# Patient Record
Sex: FEMALE | Race: WHITE | Employment: FULL TIME | URBAN - METROPOLITAN AREA
[De-identification: names, ages, dates, MRNs, and addresses within clinical notes are randomized per-mention and may not be internally consistent; named-entity substitution may affect disease eponyms.]

---

## 2017-01-04 ENCOUNTER — TELEPHONE (OUTPATIENT)
Dept: INTERNAL MEDICINE CLINIC | Facility: CLINIC | Age: 62
End: 2017-01-04

## 2017-01-04 DIAGNOSIS — I10 ESSENTIAL HYPERTENSION: ICD-10-CM

## 2017-01-04 DIAGNOSIS — E78.5 HYPERLIPIDEMIA, UNSPECIFIED HYPERLIPIDEMIA TYPE: Primary | ICD-10-CM

## 2017-01-04 NOTE — TELEPHONE ENCOUNTER
To nursing,   please tell patient do lab before 1/17/17 appointment–lipid and AST level. Orders are already in the system–they were placed on 4/25/16. Thanks.

## 2017-01-07 ENCOUNTER — LAB ENCOUNTER (OUTPATIENT)
Dept: LAB | Facility: HOSPITAL | Age: 62
End: 2017-01-07
Attending: INTERNAL MEDICINE
Payer: COMMERCIAL

## 2017-01-07 DIAGNOSIS — E78.2 MIXED HYPERLIPIDEMIA: Primary | ICD-10-CM

## 2017-01-07 LAB
AST SERPL-CCNC: 17 U/L (ref 15–41)
CHOLEST SERPL-MCNC: 163 MG/DL (ref 110–200)
HDLC SERPL-MCNC: 40 MG/DL
LDLC SERPL CALC-MCNC: 77 MG/DL (ref 0–99)
NONHDLC SERPL-MCNC: 123 MG/DL
TRIGL SERPL-MCNC: 228 MG/DL (ref 1–149)

## 2017-01-07 PROCEDURE — 80061 LIPID PANEL: CPT

## 2017-01-07 PROCEDURE — 84450 TRANSFERASE (AST) (SGOT): CPT

## 2017-01-07 PROCEDURE — 36415 COLL VENOUS BLD VENIPUNCTURE: CPT

## 2017-01-17 ENCOUNTER — OFFICE VISIT (OUTPATIENT)
Dept: INTERNAL MEDICINE CLINIC | Facility: CLINIC | Age: 62
End: 2017-01-17

## 2017-01-17 VITALS
SYSTOLIC BLOOD PRESSURE: 150 MMHG | HEART RATE: 68 BPM | WEIGHT: 193 LBS | BODY MASS INDEX: 31.77 KG/M2 | TEMPERATURE: 98 F | DIASTOLIC BLOOD PRESSURE: 80 MMHG | HEIGHT: 65.5 IN | OXYGEN SATURATION: 98 %

## 2017-01-17 DIAGNOSIS — F32.A ANXIETY AND DEPRESSION: ICD-10-CM

## 2017-01-17 DIAGNOSIS — I10 ESSENTIAL HYPERTENSION: Primary | ICD-10-CM

## 2017-01-17 DIAGNOSIS — E78.2 HYPERLIPIDEMIA, MIXED: ICD-10-CM

## 2017-01-17 DIAGNOSIS — F41.9 ANXIETY AND DEPRESSION: ICD-10-CM

## 2017-01-17 PROCEDURE — 99212 OFFICE O/P EST SF 10 MIN: CPT | Performed by: INTERNAL MEDICINE

## 2017-01-17 PROCEDURE — 99214 OFFICE O/P EST MOD 30 MIN: CPT | Performed by: INTERNAL MEDICINE

## 2017-01-17 RX ORDER — ATENOLOL 25 MG/1
TABLET ORAL
Qty: 90 TABLET | Refills: 3 | Status: SHIPPED | OUTPATIENT
Start: 2017-01-17 | End: 2017-02-13

## 2017-01-17 RX ORDER — OMEPRAZOLE 20 MG/1
20 CAPSULE, DELAYED RELEASE ORAL DAILY
Qty: 90 CAPSULE | Refills: 3 | Status: SHIPPED | OUTPATIENT
Start: 2017-01-17 | End: 2017-06-13

## 2017-01-17 RX ORDER — LISINOPRIL 40 MG/1
40 TABLET ORAL DAILY
Qty: 90 TABLET | Refills: 3 | Status: SHIPPED | OUTPATIENT
Start: 2017-01-17 | End: 2017-06-13

## 2017-01-17 RX ORDER — ALPRAZOLAM 0.25 MG/1
TABLET ORAL
Qty: 30 TABLET | Refills: 2 | Status: SHIPPED
Start: 2017-01-17 | End: 2017-06-13

## 2017-01-17 NOTE — PROGRESS NOTES
Angelia Calderon is a 57year old female who presents for     check at 9 months. HTN: home BPs are not checked lately. Hyperlipidemia: changed from simvastatin 10 mg daily to lipitor 10 mg daily --see 4/25/16 phone call.  She waited until Oct to star Comment endometrial bx-dysplasia    CHOLECYSTECTOMY      APPENDECTOMY      T&A      MASTECTOMY LEFT      Comment Dr Abbey Lucio Left     Comment Dr Estefany Brandon  2012    Comment lap. Becca Hernandez laterally. No ax nodes. Abdomen: soft, nontender without mass or hepatosplenomegaly  Extremities: no edema  Neurologic: alert and oriented  R arm and low back non tend. Affect-minimal anxiety noted.        ASSESSMENT AND PLAN:     Hypertension   on zes Oral Tab TAKE 1 BY MOUTH DAILY Disp: 90 tablet Rfl: 3   lisinopril 40 MG Oral Tab Take 1 tablet (40 mg total) by mouth daily. Disp: 90 tablet Rfl: 3   omeprazole 20 MG Oral Capsule Delayed Release Take 1 capsule (20 mg total) by mouth daily.  Disp: 90 capsu

## 2017-02-13 ENCOUNTER — TELEPHONE (OUTPATIENT)
Dept: INTERNAL MEDICINE CLINIC | Facility: CLINIC | Age: 62
End: 2017-02-13

## 2017-02-13 DIAGNOSIS — I10 ESSENTIAL HYPERTENSION: ICD-10-CM

## 2017-02-13 DIAGNOSIS — E78.2 HYPERLIPIDEMIA, MIXED: Primary | ICD-10-CM

## 2017-02-13 RX ORDER — SIMVASTATIN 10 MG
10 TABLET ORAL EVERY OTHER DAY
Qty: 1 TABLET | Refills: 0 | COMMUNITY
Start: 2017-02-13 | End: 2017-06-13

## 2017-02-13 RX ORDER — ATENOLOL 25 MG/1
12.5 TABLET ORAL DAILY
Qty: 1 TABLET | Refills: 0 | COMMUNITY
Start: 2017-02-13 | End: 2017-07-07

## 2017-02-13 NOTE — TELEPHONE ENCOUNTER
Pt is calling to report blood pressure readings. Morning readings have been around 126/80. Evenings - 148/82   140/90 today    Pt is calling for medication directions. Pt stopped Lipitor after the last visit.     Tasked to Nursing

## 2017-02-13 NOTE — TELEPHONE ENCOUNTER
Pt is worried about taking Atenolol 50 mg. She takes her Atenolol at night and is worried about her blood pressure going too low. She wondered if she could take Atenolol 25 mg two times daily, in the morning with the lisinopril and then again at night?

## 2017-02-13 NOTE — TELEPHONE ENCOUNTER
To nursing,   please tell patient to increase atenolol 50 mg daily. #90 with 3 refills.   If the muscle pain is better off the Lipitor, options are to not restart cholesterol medicine and follow diet closely --or if she is willing to start a medicine in an

## 2017-02-13 NOTE — TELEPHONE ENCOUNTER
Patient contacted and notified that per Charly Broussard, ok for her to take atenolol as 25mg BID and to go back to simvastatin 10mg daily. Patient did not need refills at this time, she will let us know when close to running out of medication.  Patient also no

## 2017-02-14 NOTE — TELEPHONE ENCOUNTER
TSH added to lab order, copy of order for TSH, lipids and AST mailed to patient to do in 6 weeks.  Patient notified

## 2017-05-26 ENCOUNTER — TELEPHONE (OUTPATIENT)
Dept: INTERNAL MEDICINE CLINIC | Facility: CLINIC | Age: 62
End: 2017-05-26

## 2017-05-26 NOTE — TELEPHONE ENCOUNTER
To nursing, yes, please do the labs intended for the 6 mo check --in other words, do lab before 6/13/17 visit --cbc cmp tsh lipid ua.  Orders are in the computer--entered in jan 2017    She never went for the lipid AST TSH entered at 2/13/17 phone call and

## 2017-05-26 NOTE — TELEPHONE ENCOUNTER
Patient scheduled physical with Dr Edi Latif on June 13  Requests cbc is added to her orders since she is coming in for her physical    Please confirm to patient [phone]

## 2017-05-26 NOTE — TELEPHONE ENCOUNTER
Called patient and relayed DR. BRENNAN message - verbalized understanding.  Lab order from February cancelled

## 2017-05-27 ENCOUNTER — LAB ENCOUNTER (OUTPATIENT)
Dept: LAB | Facility: HOSPITAL | Age: 62
End: 2017-05-27
Attending: INTERNAL MEDICINE
Payer: COMMERCIAL

## 2017-05-27 DIAGNOSIS — I10 ESSENTIAL HYPERTENSION: ICD-10-CM

## 2017-05-27 DIAGNOSIS — E78.2 HYPERLIPIDEMIA, MIXED: ICD-10-CM

## 2017-05-27 PROCEDURE — 85025 COMPLETE CBC W/AUTO DIFF WBC: CPT

## 2017-05-27 PROCEDURE — 80053 COMPREHEN METABOLIC PANEL: CPT

## 2017-05-27 PROCEDURE — 81001 URINALYSIS AUTO W/SCOPE: CPT

## 2017-05-27 PROCEDURE — 84443 ASSAY THYROID STIM HORMONE: CPT

## 2017-05-27 PROCEDURE — 80061 LIPID PANEL: CPT

## 2017-05-27 PROCEDURE — 36415 COLL VENOUS BLD VENIPUNCTURE: CPT

## 2017-05-30 ENCOUNTER — TELEPHONE (OUTPATIENT)
Dept: INTERNAL MEDICINE CLINIC | Facility: CLINIC | Age: 62
End: 2017-05-30

## 2017-05-30 DIAGNOSIS — D64.9 MILD ANEMIA: Primary | ICD-10-CM

## 2017-05-30 NOTE — TELEPHONE ENCOUNTER
To nursing. Please tell pt lab done on 5/27/17-cbc cmp tsh lipid ua --results are ok except very mild anemia with Hgb 11.7. We'll ask the lab to check her iron levels on the specimen of 5/27. See me 6/13/17 as planned.    To nursing--please call lab to a

## 2017-05-30 NOTE — TELEPHONE ENCOUNTER
Pt notified labs ok except a very mild anemia with hgb 11.7 to check iron levels     Cannot use specimen from 5-27  Pt notified that orders are entered and she can do without fasting   Will get done this wk     See DR BRENNAN as planned on 6-13

## 2017-06-05 ENCOUNTER — APPOINTMENT (OUTPATIENT)
Dept: LAB | Age: 62
End: 2017-06-05
Attending: INTERNAL MEDICINE
Payer: COMMERCIAL

## 2017-06-05 DIAGNOSIS — D64.9 MILD ANEMIA: ICD-10-CM

## 2017-06-05 PROCEDURE — 83540 ASSAY OF IRON: CPT

## 2017-06-05 PROCEDURE — 82746 ASSAY OF FOLIC ACID SERUM: CPT

## 2017-06-05 PROCEDURE — 84466 ASSAY OF TRANSFERRIN: CPT

## 2017-06-05 PROCEDURE — 82607 VITAMIN B-12: CPT

## 2017-06-05 PROCEDURE — 36415 COLL VENOUS BLD VENIPUNCTURE: CPT

## 2017-06-05 PROCEDURE — 82728 ASSAY OF FERRITIN: CPT

## 2017-06-13 ENCOUNTER — OFFICE VISIT (OUTPATIENT)
Dept: INTERNAL MEDICINE CLINIC | Facility: CLINIC | Age: 62
End: 2017-06-13

## 2017-06-13 VITALS
TEMPERATURE: 98 F | HEART RATE: 68 BPM | WEIGHT: 182 LBS | DIASTOLIC BLOOD PRESSURE: 72 MMHG | BODY MASS INDEX: 29.96 KG/M2 | SYSTOLIC BLOOD PRESSURE: 124 MMHG | HEIGHT: 65.5 IN

## 2017-06-13 DIAGNOSIS — F41.9 ANXIETY: ICD-10-CM

## 2017-06-13 DIAGNOSIS — I83.92 VARICOSE VEINS OF LEFT LOWER EXTREMITY: ICD-10-CM

## 2017-06-13 DIAGNOSIS — Z00.00 ANNUAL PHYSICAL EXAM: Primary | ICD-10-CM

## 2017-06-13 DIAGNOSIS — K44.9 HIATAL HERNIA: ICD-10-CM

## 2017-06-13 DIAGNOSIS — I10 ESSENTIAL HYPERTENSION: ICD-10-CM

## 2017-06-13 DIAGNOSIS — D64.9 MILD ANEMIA: ICD-10-CM

## 2017-06-13 DIAGNOSIS — E78.2 HYPERLIPIDEMIA, MIXED: ICD-10-CM

## 2017-06-13 PROCEDURE — 99396 PREV VISIT EST AGE 40-64: CPT | Performed by: INTERNAL MEDICINE

## 2017-06-13 RX ORDER — AMLODIPINE BESYLATE 2.5 MG/1
2.5 TABLET ORAL DAILY
Qty: 90 TABLET | Refills: 3 | Status: SHIPPED | OUTPATIENT
Start: 2017-06-13 | End: 2017-07-31

## 2017-06-13 RX ORDER — LISINOPRIL 40 MG/1
40 TABLET ORAL DAILY
Qty: 90 TABLET | Refills: 3 | Status: SHIPPED | OUTPATIENT
Start: 2017-06-13 | End: 2018-05-01

## 2017-06-13 RX ORDER — ALPRAZOLAM 0.25 MG/1
TABLET ORAL
Qty: 30 TABLET | Refills: 2 | Status: SHIPPED
Start: 2017-06-13 | End: 2018-05-30

## 2017-06-13 RX ORDER — SIMVASTATIN 10 MG
10 TABLET ORAL EVERY OTHER DAY
Qty: 45 TABLET | Refills: 3 | Status: SHIPPED | OUTPATIENT
Start: 2017-06-13 | End: 2018-05-01

## 2017-06-13 RX ORDER — OMEPRAZOLE 20 MG/1
20 CAPSULE, DELAYED RELEASE ORAL DAILY
Qty: 90 CAPSULE | Refills: 3 | Status: SHIPPED | OUTPATIENT
Start: 2017-06-13 | End: 2018-05-01

## 2017-06-13 NOTE — PROGRESS NOTES
Pio Pena is a 57year old female who presents for     Annual physical.     HTN: home BPs are 100-138/60-80. Taking lisinopril 40 mg daily and she feels the atenolol is causing anxiety and she cut dose last wk to 12.5 mg daily.  She requests to get o PROSTHESIS Left     Comment Dr Mindy Paez  2012    Comment lap. paraesophageal hernia repair ; lap. partial fundoplicaton 069 degrees w paraesoph hernia repair; Dr Quyen Douglas  2002    COLONOSCOPY  20 nontender without mass or hepatosplenomegaly  Extremities: no edema, varicose veins LLE-not inflammed. Neurologic: alert and oriented  Affect: mild anxiety.        ASSESSMENT AND PLAN:     Annual physical    Mild anemia:  Hgb 11.7 on 5/27/17--down from 12 daily.  Disp: 1 tablet Rfl: 0   simvastatin 10 MG Oral Tab Take 10 mg by mouth every other day. Disp: 1 tablet Rfl: 0   lisinopril 40 MG Oral Tab Take 1 tablet (40 mg total) by mouth daily.  Disp: 90 tablet Rfl: 3   omeprazole 20 MG Oral Capsule Delayed Re

## 2017-06-17 ENCOUNTER — HOSPITAL ENCOUNTER (OUTPATIENT)
Dept: CT IMAGING | Facility: HOSPITAL | Age: 62
Discharge: HOME | End: 2017-06-17
Attending: INTERNAL MEDICINE

## 2017-06-17 VITALS — DIASTOLIC BLOOD PRESSURE: 72 MMHG | HEART RATE: 64 BPM | SYSTOLIC BLOOD PRESSURE: 119 MMHG

## 2017-06-17 DIAGNOSIS — Z13.6 SCREENING FOR CARDIOVASCULAR CONDITION: ICD-10-CM

## 2017-06-17 NOTE — IMAGING NOTE
Cholesterol/Glucose screening not performed. Recent lipid panel, 5-27-17. Results discussed with  Lorena who verbalized understanding. Preliminary results for CT Calcium Score provided: 5.25. Risks and findings discussed with Mrs. Carrillo who verb

## 2017-06-20 ENCOUNTER — TELEPHONE (OUTPATIENT)
Dept: INTERNAL MEDICINE CLINIC | Facility: CLINIC | Age: 62
End: 2017-06-20

## 2017-06-20 NOTE — TELEPHONE ENCOUNTER
To nursing, please tell patient 6/17/17–CT calcium scoring result is ok--calcium score is 5. This is a good result (minimal amount of calcium). Continue meds for cholesterol and hypertension. Thanks.

## 2017-07-07 PROBLEM — Z80.0 FAMILY HISTORY OF COLON CANCER IN MOTHER: Status: ACTIVE | Noted: 2017-07-07

## 2017-07-20 ENCOUNTER — TELEPHONE (OUTPATIENT)
Dept: INTERNAL MEDICINE CLINIC | Facility: CLINIC | Age: 62
End: 2017-07-20

## 2017-07-20 ENCOUNTER — HOSPITAL ENCOUNTER (EMERGENCY)
Facility: HOSPITAL | Age: 62
Discharge: HOME | End: 2017-07-20
Attending: EMERGENCY MEDICINE
Payer: COMMERCIAL

## 2017-07-20 VITALS
SYSTOLIC BLOOD PRESSURE: 132 MMHG | HEART RATE: 95 BPM | OXYGEN SATURATION: 96 % | BODY MASS INDEX: 29.16 KG/M2 | WEIGHT: 175 LBS | TEMPERATURE: 98 F | RESPIRATION RATE: 18 BRPM | DIASTOLIC BLOOD PRESSURE: 79 MMHG | HEIGHT: 65 IN

## 2017-07-20 DIAGNOSIS — I82.412 DEEP VEIN THROMBOSIS (DVT) OF FEMORAL VEIN OF LEFT LOWER EXTREMITY, UNSPECIFIED CHRONICITY (HCC): Primary | ICD-10-CM

## 2017-07-20 LAB
ANION GAP SERPL CALC-SCNC: 10 MMOL/L (ref 0–18)
APTT PPP: 25.2 SECONDS (ref 23.2–35.3)
BASOPHILS # BLD: 0 K/UL (ref 0–0.2)
BASOPHILS NFR BLD: 0 %
BUN SERPL-MCNC: 13 MG/DL (ref 8–20)
BUN/CREAT SERPL: 14 (ref 10–20)
CALCIUM SERPL-MCNC: 10.1 MG/DL (ref 8.5–10.5)
CHLORIDE SERPL-SCNC: 101 MMOL/L (ref 95–110)
CO2 SERPL-SCNC: 25 MMOL/L (ref 22–32)
CREAT SERPL-MCNC: 0.93 MG/DL (ref 0.5–1.5)
D DIMER PPP FEU-MCNC: 0.55 MCG/ML (ref ?–0.63)
EOSINOPHIL # BLD: 0.1 K/UL (ref 0–0.7)
EOSINOPHIL NFR BLD: 1 %
ERYTHROCYTE [DISTWIDTH] IN BLOOD BY AUTOMATED COUNT: 15 % (ref 11–15)
GLUCOSE SERPL-MCNC: 105 MG/DL (ref 70–99)
HCT VFR BLD AUTO: 40.1 % (ref 35–48)
HGB BLD-MCNC: 13.2 G/DL (ref 12–16)
INR BLD: 1 (ref 0.9–1.2)
LYMPHOCYTES # BLD: 1.1 K/UL (ref 1–4)
LYMPHOCYTES NFR BLD: 10 %
MCH RBC QN AUTO: 29.4 PG (ref 27–32)
MCHC RBC AUTO-ENTMCNC: 33 G/DL (ref 32–37)
MCV RBC AUTO: 89.2 FL (ref 80–100)
MONOCYTES # BLD: 0.6 K/UL (ref 0–1)
MONOCYTES NFR BLD: 5 %
NEUTROPHILS # BLD AUTO: 9.2 K/UL (ref 1.8–7.7)
NEUTROPHILS NFR BLD: 84 %
OSMOLALITY UR CALC.SUM OF ELEC: 282 MOSM/KG (ref 275–295)
PLATELET # BLD AUTO: 266 K/UL (ref 140–400)
PMV BLD AUTO: 8.2 FL (ref 7.4–10.3)
POTASSIUM SERPL-SCNC: 3.9 MMOL/L (ref 3.3–5.1)
PROTHROMBIN TIME: 12.8 SECONDS (ref 11.8–14.5)
RBC # BLD AUTO: 4.49 M/UL (ref 3.7–5.4)
SODIUM SERPL-SCNC: 136 MMOL/L (ref 136–144)
WBC # BLD AUTO: 11 K/UL (ref 4–11)

## 2017-07-20 PROCEDURE — 85730 THROMBOPLASTIN TIME PARTIAL: CPT | Performed by: EMERGENCY MEDICINE

## 2017-07-20 PROCEDURE — 36415 COLL VENOUS BLD VENIPUNCTURE: CPT

## 2017-07-20 PROCEDURE — 99283 EMERGENCY DEPT VISIT LOW MDM: CPT

## 2017-07-20 PROCEDURE — 85379 FIBRIN DEGRADATION QUANT: CPT | Performed by: EMERGENCY MEDICINE

## 2017-07-20 PROCEDURE — 85025 COMPLETE CBC W/AUTO DIFF WBC: CPT | Performed by: EMERGENCY MEDICINE

## 2017-07-20 PROCEDURE — 80048 BASIC METABOLIC PNL TOTAL CA: CPT | Performed by: EMERGENCY MEDICINE

## 2017-07-20 PROCEDURE — 96372 THER/PROPH/DIAG INJ SC/IM: CPT

## 2017-07-20 PROCEDURE — 85610 PROTHROMBIN TIME: CPT | Performed by: EMERGENCY MEDICINE

## 2017-07-20 RX ORDER — ENOXAPARIN SODIUM 100 MG/ML
1 INJECTION SUBCUTANEOUS 2 TIMES DAILY
Qty: 20 VIAL | Refills: 0 | Status: SHIPPED | OUTPATIENT
Start: 2017-07-20 | End: 2017-07-31

## 2017-07-20 RX ORDER — ENOXAPARIN SODIUM 100 MG/ML
80 INJECTION SUBCUTANEOUS ONCE
Status: COMPLETED | OUTPATIENT
Start: 2017-07-20 | End: 2017-07-20

## 2017-07-20 NOTE — TELEPHONE ENCOUNTER
Patient reports she was seen for a routine visit at vein clinic today for . She has had chronic swelling to that leg after having her veins stripped years ago. Patient denies new symptoms besides the same chronic swelling she has had.  Was told by

## 2017-07-20 NOTE — TELEPHONE ENCOUNTER
Pt scheduled an appt with  for 7/21. She went to the vein clinic at [facility], had an ultrasound. She has a clot. She wanted to speak to a nurse or a doctor on call today. Transferred to a triage nurse.

## 2017-07-21 ENCOUNTER — HOSPITAL ENCOUNTER (OUTPATIENT)
Dept: ULTRASOUND IMAGING | Facility: HOSPITAL | Age: 62
Discharge: HOME | End: 2017-07-21
Attending: INTERNAL MEDICINE
Payer: COMMERCIAL

## 2017-07-21 ENCOUNTER — OFFICE VISIT (OUTPATIENT)
Dept: INTERNAL MEDICINE CLINIC | Facility: CLINIC | Age: 62
End: 2017-07-21

## 2017-07-21 VITALS
WEIGHT: 178.81 LBS | BODY MASS INDEX: 30 KG/M2 | DIASTOLIC BLOOD PRESSURE: 80 MMHG | HEART RATE: 97 BPM | TEMPERATURE: 98 F | OXYGEN SATURATION: 94 % | SYSTOLIC BLOOD PRESSURE: 138 MMHG

## 2017-07-21 DIAGNOSIS — I82.412 DEEP VEIN THROMBOSIS (DVT) OF FEMORAL VEIN OF LEFT LOWER EXTREMITY, UNSPECIFIED CHRONICITY (HCC): ICD-10-CM

## 2017-07-21 DIAGNOSIS — I82.412 DEEP VEIN THROMBOSIS (DVT) OF FEMORAL VEIN OF LEFT LOWER EXTREMITY, UNSPECIFIED CHRONICITY (HCC): Primary | ICD-10-CM

## 2017-07-21 PROCEDURE — 99213 OFFICE O/P EST LOW 20 MIN: CPT | Performed by: INTERNAL MEDICINE

## 2017-07-21 PROCEDURE — 99215 OFFICE O/P EST HI 40 MIN: CPT | Performed by: INTERNAL MEDICINE

## 2017-07-21 PROCEDURE — 93971 EXTREMITY STUDY: CPT | Performed by: INTERNAL MEDICINE

## 2017-07-21 PROCEDURE — 93005 ELECTROCARDIOGRAM TRACING: CPT | Performed by: INTERNAL MEDICINE

## 2017-07-21 PROCEDURE — 93010 ELECTROCARDIOGRAM REPORT: CPT | Performed by: INTERNAL MEDICINE

## 2017-07-21 NOTE — ED INITIAL ASSESSMENT (HPI)
Pt reports left sided femoral, and popliteal blood clot. Pt reports going to vein clinic today. Pt denies cp, or sob.

## 2017-07-21 NOTE — PROGRESS NOTES
Neville Galindo is a 60year old female who presents for     DVT left leg. Patient was seen yesterday by [facility] Dr Yessi Prado for varicose veins.   Pt had noticed worsened in \"color\" of the veins on the LLE below the medial ankle a neg for vitaliy 2009   • History of vein stripping    • Hyperlipidemia     elevated cholesterol and triglycerides   • Hypertension, essential 1993   • Iron deficiency anemia 2004    endoscopy (-)   • Screening for heart disease 6/2017    calcium heart score loss  Respiratory: No cough, wheezing or dyspnea  Cardiac: No chest pain or palpitations  Gastrointestinal: No abdominal pain, vomiting, diarrhea or constipation, no blood or black stools.    Genitourinary:  No dysuria or blood in the urine  Dermatologic: with central patency and may represent chronic recannulation of a previous thrombus. Without prior ultrasounds to compare, she said there is no way to tell whether this is acute or chronic.   She recommends doing a follow-up Doppler after treatment to show Oral Capsule Delayed Release Take 1 capsule (20 mg total) by mouth daily.  Disp: 90 capsule Rfl: 3         Alex Leal MD  7/21/2017

## 2017-07-21 NOTE — TELEPHONE ENCOUNTER
Spoke with pt. [facility] for an initial consultation for chronic LE edema/venous insufficiency. They apparently did a venous doppler in the office. Was told she has a femoral and popliteal vein DVT.   Was given a Rx for xarelto but was

## 2017-07-21 NOTE — ED PROVIDER NOTES
Patient Seen in: [facility]     History   Patient presents with:  Deep Vein Thrombosis (cardiovascular)    Stated Complaint: blood clot     HPI    60year old female presented to the ER complaining that she was diagnosed with a degrees w paraesoph                hernia repair; Dr Melania Berg: Iram Agudelo Right  : IMPLANTABLE BREAST PROSTHESIS Left      Comment: Dr Arley Rae  : MASTECTOMY LEFT      Comment: Dr Arnie Layton: MASTECTOMY RIGHT  No [07/20/17 1949]  BP: 150/81  Pulse: 103  Resp: 20  Temp: 97.9 °F (36.6 °C)  Temp src: Temporal  SpO2: 97 %  O2 Device: None (Room air)    Current:/84   Pulse 102   Temp 97.9 °F (36.6 °C) (Temporal)   Resp 20   Ht 165.1 cm (5' 5\")   Wt 79.4 kg   SpO2 PTT, ACTIVATED - Normal   D-DIMER - Normal   CBC WITH DIFFERENTIAL WITH PLATELET    Narrative: The following orders were created for panel order CBC WITH DIFFERENTIAL WITH PLATELET.   Procedure                               Abnormality         Status ED evaluation. Radiology Interpretation: None    Monitor Interpretation: normal sinus rhythm with a rate of 100    Oxygen Saturation: 95% on room air, Normal    Consults: No orders of the defined types were placed in this encounter.       MD Discussions

## 2017-07-24 ENCOUNTER — LAB ENCOUNTER (OUTPATIENT)
Dept: LAB | Age: 62
End: 2017-07-24
Attending: INTERNAL MEDICINE
Payer: COMMERCIAL

## 2017-07-24 DIAGNOSIS — I82.412 DEEP VEIN THROMBOSIS (DVT) OF FEMORAL VEIN OF LEFT LOWER EXTREMITY, UNSPECIFIED CHRONICITY (HCC): ICD-10-CM

## 2017-07-24 LAB
BASOPHILS # BLD: 0 K/UL (ref 0–0.2)
BASOPHILS NFR BLD: 1 %
EOSINOPHIL # BLD: 0.1 K/UL (ref 0–0.7)
EOSINOPHIL NFR BLD: 2 %
ERYTHROCYTE [DISTWIDTH] IN BLOOD BY AUTOMATED COUNT: 14.5 % (ref 11–15)
HCT VFR BLD AUTO: 35.8 % (ref 35–48)
HGB BLD-MCNC: 12.1 G/DL (ref 12–16)
LYMPHOCYTES # BLD: 1.4 K/UL (ref 1–4)
LYMPHOCYTES NFR BLD: 23 %
MCH RBC QN AUTO: 29.8 PG (ref 27–32)
MCHC RBC AUTO-ENTMCNC: 33.9 G/DL (ref 32–37)
MCV RBC AUTO: 88 FL (ref 80–100)
MONOCYTES # BLD: 0.4 K/UL (ref 0–1)
MONOCYTES NFR BLD: 7 %
NEUTROPHILS # BLD AUTO: 4 K/UL (ref 1.8–7.7)
NEUTROPHILS NFR BLD: 67 %
PLATELET # BLD AUTO: 218 K/UL (ref 140–400)
PMV BLD AUTO: 8.2 FL (ref 7.4–10.3)
RBC # BLD AUTO: 4.07 M/UL (ref 3.7–5.4)
WBC # BLD AUTO: 6 K/UL (ref 4–11)

## 2017-07-24 PROCEDURE — 85390 FIBRINOLYSINS SCREEN I&R: CPT

## 2017-07-24 PROCEDURE — 85306 CLOT INHIBIT PROT S FREE: CPT

## 2017-07-24 PROCEDURE — 81240 F2 GENE: CPT

## 2017-07-24 PROCEDURE — 85613 RUSSELL VIPER VENOM DILUTED: CPT

## 2017-07-24 PROCEDURE — 81241 F5 GENE: CPT

## 2017-07-24 PROCEDURE — 85610 PROTHROMBIN TIME: CPT

## 2017-07-24 PROCEDURE — 85730 THROMBOPLASTIN TIME PARTIAL: CPT

## 2017-07-24 PROCEDURE — 85305 CLOT INHIBIT PROT S TOTAL: CPT

## 2017-07-24 PROCEDURE — 85300 ANTITHROMBIN III ACTIVITY: CPT

## 2017-07-24 PROCEDURE — 85025 COMPLETE CBC W/AUTO DIFF WBC: CPT

## 2017-07-24 PROCEDURE — 85302 CLOT INHIBIT PROT C ANTIGEN: CPT

## 2017-07-24 PROCEDURE — 86147 CARDIOLIPIN ANTIBODY EA IG: CPT

## 2017-07-24 PROCEDURE — 36415 COLL VENOUS BLD VENIPUNCTURE: CPT

## 2017-07-25 ENCOUNTER — OFFICE VISIT (OUTPATIENT)
Dept: HEMATOLOGY/ONCOLOGY | Facility: HOSPITAL | Age: 62
End: 2017-07-25
Attending: INTERNAL MEDICINE
Payer: COMMERCIAL

## 2017-07-25 ENCOUNTER — TELEPHONE (OUTPATIENT)
Dept: INTERNAL MEDICINE CLINIC | Facility: CLINIC | Age: 62
End: 2017-07-25

## 2017-07-25 DIAGNOSIS — I82.512 CHRONIC DEEP VEIN THROMBOSIS (DVT) OF FEMORAL VEIN OF LEFT LOWER EXTREMITY (HCC): Primary | ICD-10-CM

## 2017-07-25 DIAGNOSIS — Z51.81 MEDICATION MONITORING ENCOUNTER: Primary | ICD-10-CM

## 2017-07-25 LAB
AT III ACT/NOR PPP CHRO: 94 % NHP (ref 90–122)
F2 C.20210G>A GENO BLD/T: NORMAL
PROT S ACT/NOR PPP: 98 % (ref 48–153)

## 2017-07-25 PROCEDURE — 99245 OFF/OP CONSLTJ NEW/EST HI 55: CPT | Performed by: INTERNAL MEDICINE

## 2017-07-25 PROCEDURE — 99212 OFFICE O/P EST SF 10 MIN: CPT | Performed by: INTERNAL MEDICINE

## 2017-07-25 NOTE — TELEPHONE ENCOUNTER
Pt notified cbc results  Normal / DR BRENNAN   To recheck cbc tomorrow 7-26 to  Monitor lovenox  Order in computer   Pt states she does shave appt with DR Keisha Cates today - noted by DR Arthor Bernheim

## 2017-07-25 NOTE — TELEPHONE ENCOUNTER
To nursing,   please tell patient results of the CBC done 7/24/17 (yesterday)–results are normal.  Her hemoglobin is 12.1. Her platelet count is 269. Check CBC again Wednesday 7/26/17--to monitor the Lovenox she is taking.   (CBC order entered.)  I note s

## 2017-07-26 LAB
APTT PPP: 31.7 SECONDS (ref 23.2–35.3)
CONFIRM DRVVT: 1 S (ref 0–1.1)
PROTHROMBIN TIME: 13.3 SECONDS (ref 11.8–14.5)

## 2017-07-26 NOTE — PROGRESS NOTES
Hematology Consultation Note    Patient Name: Joann Ivan   YOB: 1955   Medical Record Number: [id number]   CSN: [id number]   Consulting Physician: Aureliano Greco MD  Referring Physician(s): Kayleigh Arriaga  Date of Consultation: 7/25/201 weight loss, no change in energy level, no recurrent fevers or infections. Patient is severely concerned that she has continued anemia as she was found to have evidence of a bleeding source via endoscopy in 2004.  However, she denies any symptoms of chest p Jonn  : MASTECTOMY LEFT      Comment: Dr Joann Sutherland: MASTECTOMY RIGHT  No date: OTHER SURGICAL HISTORY      Comment: vein stripping  No date: T&A  2009: UPPER GI ENDOSCOPY,DIAGNOSIS      Comment: duodenal biopsy neg for sprue  1970s: VEIN LI dysphagia, odynophagia, reflux symptoms, nausea, vomiting, change in bowel habits, diarrhea, constipation and abdominal pain. Integument/breast: Negative for rash, skin lesions, and pruritus.   Hematologic/lymphatic: Negative for easy bruising, bleeding, a CO2 25 07/20/2017 08:09 PM   EQHGHC 41 05/27/2017 08:11 AM   AST 25 05/27/2017 08:11 AM   ALT 21 05/27/2017 08:11 AM     U/S Venous Doppler Left Leg 7/21/17  =====  CONCLUSION:           Nonocclusive thrombus in portions of the left femoral and popliteal burden  --Discussed with patient that based on her prior history of blood clots at age19 and her newly discovered second episode of a lower extremity clot without provoking symptoms whose original date of onset is unknown, she may benefit from indefinite a anticoagulation for DVT prevention  --Low suspicion for this condition being associated with her DVT    4.)  Right invasive breast cancer ([place]-/[place]-) in 1989    --treated with mastectomy and adjuvant chemotherapy  --See above regarding surveillance monitorin

## 2017-07-27 LAB
CARDIOLIPIN IGA SERPL-ACNC: 2.8 APL (ref 0–11.9)
CARDIOLIPIN IGG SERPL-ACNC: 8.3 GPL (ref 0–14.9)
CARDIOLIPIN IGM SERPL-ACNC: 7 MPL (ref 0–12.4)
PROTEIN C, TOTAL ANTIGEN: 239 %
PROTEIN S, TOTAL ANTIGEN: 178 %

## 2017-07-28 PROCEDURE — 88305 TISSUE EXAM BY PATHOLOGIST: CPT | Performed by: INTERNAL MEDICINE

## 2017-07-31 ENCOUNTER — OFFICE VISIT (OUTPATIENT)
Dept: INTERNAL MEDICINE CLINIC | Facility: CLINIC | Age: 62
End: 2017-07-31

## 2017-07-31 ENCOUNTER — LAB ENCOUNTER (OUTPATIENT)
Dept: LAB | Age: 62
End: 2017-07-31
Attending: INTERNAL MEDICINE
Payer: COMMERCIAL

## 2017-07-31 VITALS
DIASTOLIC BLOOD PRESSURE: 70 MMHG | TEMPERATURE: 98 F | OXYGEN SATURATION: 96 % | HEART RATE: 68 BPM | SYSTOLIC BLOOD PRESSURE: 112 MMHG | BODY MASS INDEX: 30.16 KG/M2 | HEIGHT: 65 IN | WEIGHT: 181 LBS

## 2017-07-31 DIAGNOSIS — I82.412 DEEP VEIN THROMBOSIS (DVT) OF FEMORAL VEIN OF LEFT LOWER EXTREMITY, UNSPECIFIED CHRONICITY (HCC): Primary | ICD-10-CM

## 2017-07-31 DIAGNOSIS — M76.60 ACHILLES TENDON PAIN: ICD-10-CM

## 2017-07-31 DIAGNOSIS — D68.51 FACTOR 5 LEIDEN MUTATION, HETEROZYGOUS (HCC): ICD-10-CM

## 2017-07-31 DIAGNOSIS — Z51.81 MEDICATION MONITORING ENCOUNTER: ICD-10-CM

## 2017-07-31 LAB
BASOPHILS # BLD: 0 K/UL (ref 0–0.2)
BASOPHILS NFR BLD: 1 %
EOSINOPHIL # BLD: 0.2 K/UL (ref 0–0.7)
EOSINOPHIL NFR BLD: 3 %
ERYTHROCYTE [DISTWIDTH] IN BLOOD BY AUTOMATED COUNT: 15.3 % (ref 11–15)
HCT VFR BLD AUTO: 36.2 % (ref 35–48)
HGB BLD-MCNC: 12.1 G/DL (ref 12–16)
LYMPHOCYTES # BLD: 1 K/UL (ref 1–4)
LYMPHOCYTES NFR BLD: 16 %
MCH RBC QN AUTO: 29.8 PG (ref 27–32)
MCHC RBC AUTO-ENTMCNC: 33.3 G/DL (ref 32–37)
MCV RBC AUTO: 89.4 FL (ref 80–100)
MONOCYTES # BLD: 0.4 K/UL (ref 0–1)
MONOCYTES NFR BLD: 7 %
NEUTROPHILS # BLD AUTO: 4.5 K/UL (ref 1.8–7.7)
NEUTROPHILS NFR BLD: 73 %
PLATELET # BLD AUTO: 223 K/UL (ref 140–400)
PMV BLD AUTO: 8.3 FL (ref 7.4–10.3)
RBC # BLD AUTO: 4.06 M/UL (ref 3.7–5.4)
WBC # BLD AUTO: 6.1 K/UL (ref 4–11)

## 2017-07-31 PROCEDURE — 85025 COMPLETE CBC W/AUTO DIFF WBC: CPT

## 2017-07-31 PROCEDURE — 36415 COLL VENOUS BLD VENIPUNCTURE: CPT

## 2017-07-31 PROCEDURE — 99212 OFFICE O/P EST SF 10 MIN: CPT | Performed by: INTERNAL MEDICINE

## 2017-07-31 PROCEDURE — 99214 OFFICE O/P EST MOD 30 MIN: CPT | Performed by: INTERNAL MEDICINE

## 2017-07-31 NOTE — PROGRESS NOTES
Angelia Calderon is a 60year old female who presents for     10 days recheck    L leg DVT-  Feels good. No pain in LLE. No leg swelling. No CP or SOB.    Is going to see Dr. Espinoza Franks a cardiovascular doctor at [facility] 8/14/17--asks records to take with Left leg DVT ([facility] [address] 07/2017   • Screening for heart disease 6/2017    calcium heart score 5   • Vertigo 2009      Past Surgical History:  No date: APPENDECTOMY  No date: BIOPSY OF UTERUS LINING      Comment: endometrial bx-dysplasia  1989: CHEMOTHERAPY [address]   Temp 98.1 °F (36.7 °C) (Oral)   Ht 5' 5\" (1.651 m)   Wt 181 lb (82.1 kg)   SpO2 96%   BMI 30.12 kg/m²       Wt Readings from Last 6 Encounters:  07/31/17 : 181 lb (82.1 kg)  07/28/17 : 176 lb (79.8 kg)  07/21/17 : 178 lb 12.8 oz (81.1 kg)  07/20/17 : Outpatient Prescriptions:  Rivaroxaban (XARELTO STARTER PACK) 15 & 20 MG Oral Tablet Therapy Pack Take 15 mg by mouth twice daily with meals for the first  21 days (42 tablets total), then take 20 mg by mouth once a day with meals.   Disp: 1 each Rfl: 0   A

## 2017-08-01 ENCOUNTER — TELEPHONE (OUTPATIENT)
Dept: INTERNAL MEDICINE CLINIC | Facility: CLINIC | Age: 62
End: 2017-08-01

## 2017-08-01 NOTE — TELEPHONE ENCOUNTER
To nursing, please tell patient CBC done on 7/31/17–results are normal.  Her hemoglobin is stable at 12.1. Thanks.

## 2017-08-06 ENCOUNTER — TELEPHONE (OUTPATIENT)
Dept: HEMATOLOGY/ONCOLOGY | Facility: HOSPITAL | Age: 62
End: 2017-08-06

## 2017-08-07 ENCOUNTER — TELEPHONE (OUTPATIENT)
Dept: INTERNAL MEDICINE CLINIC | Facility: CLINIC | Age: 62
End: 2017-08-07

## 2017-08-07 ENCOUNTER — LAB ENCOUNTER (OUTPATIENT)
Dept: LAB | Age: 62
End: 2017-08-07
Attending: INTERNAL MEDICINE
Payer: COMMERCIAL

## 2017-08-07 ENCOUNTER — OFFICE VISIT (OUTPATIENT)
Dept: INTERNAL MEDICINE CLINIC | Facility: CLINIC | Age: 62
End: 2017-08-07

## 2017-08-07 VITALS
SYSTOLIC BLOOD PRESSURE: 130 MMHG | WEIGHT: 180.81 LBS | TEMPERATURE: 99 F | BODY MASS INDEX: 30.12 KG/M2 | OXYGEN SATURATION: 94 % | DIASTOLIC BLOOD PRESSURE: 80 MMHG | HEIGHT: 65 IN | HEART RATE: 83 BPM

## 2017-08-07 DIAGNOSIS — R39.9 UTI SYMPTOMS: ICD-10-CM

## 2017-08-07 DIAGNOSIS — R39.9 UTI SYMPTOMS: Primary | ICD-10-CM

## 2017-08-07 DIAGNOSIS — R31.0 GROSS HEMATURIA: Primary | ICD-10-CM

## 2017-08-07 DIAGNOSIS — R82.998 BROWN-COLORED URINE: ICD-10-CM

## 2017-08-07 LAB
ALBUMIN SERPL BCP-MCNC: 3.7 G/DL (ref 3.5–4.8)
ALBUMIN/GLOB SERPL: 1.1 {RATIO} (ref 1–2)
ALP SERPL-CCNC: 68 U/L (ref 32–100)
ALT SERPL-CCNC: 18 U/L (ref 14–54)
ANION GAP SERPL CALC-SCNC: 6 MMOL/L (ref 0–18)
AST SERPL-CCNC: 17 U/L (ref 15–41)
BASOPHILS # BLD: 0 K/UL (ref 0–0.2)
BASOPHILS NFR BLD: 0 %
BILIRUB SERPL-MCNC: 0.5 MG/DL (ref 0.3–1.2)
BILIRUB UR QL: NEGATIVE
BUN SERPL-MCNC: 9 MG/DL (ref 8–20)
BUN/CREAT SERPL: 11.1 (ref 10–20)
CALCIUM SERPL-MCNC: 10.1 MG/DL (ref 8.5–10.5)
CHLORIDE SERPL-SCNC: 106 MMOL/L (ref 95–110)
CLARITY UR: CLEAR
CO2 SERPL-SCNC: 26 MMOL/L (ref 22–32)
COLOR UR: YELLOW
CREAT SERPL-MCNC: 0.81 MG/DL (ref 0.5–1.5)
EOSINOPHIL # BLD: 0.1 K/UL (ref 0–0.7)
EOSINOPHIL NFR BLD: 2 %
ERYTHROCYTE [DISTWIDTH] IN BLOOD BY AUTOMATED COUNT: 14.4 % (ref 11–15)
GLOBULIN PLAS-MCNC: 3.5 G/DL (ref 2.5–3.7)
GLUCOSE SERPL-MCNC: 101 MG/DL (ref 70–99)
GLUCOSE UR-MCNC: NEGATIVE MG/DL
HCT VFR BLD AUTO: 35.8 % (ref 35–48)
HGB BLD-MCNC: 11.8 G/DL (ref 12–16)
KETONES UR-MCNC: NEGATIVE MG/DL
LEUKOCYTE ESTERASE UR QL STRIP.AUTO: NEGATIVE
LYMPHOCYTES # BLD: 0.7 K/UL (ref 1–4)
LYMPHOCYTES NFR BLD: 10 %
MCH RBC QN AUTO: 29.5 PG (ref 27–32)
MCHC RBC AUTO-ENTMCNC: 32.8 G/DL (ref 32–37)
MCV RBC AUTO: 89.9 FL (ref 80–100)
MONOCYTES # BLD: 0.4 K/UL (ref 0–1)
MONOCYTES NFR BLD: 5 %
NEUTROPHILS # BLD AUTO: 6.2 K/UL (ref 1.8–7.7)
NEUTROPHILS NFR BLD: 83 %
NITRITE UR QL STRIP.AUTO: NEGATIVE
OSMOLALITY UR CALC.SUM OF ELEC: 285 MOSM/KG (ref 275–295)
PH UR: 6 [PH] (ref 5–8)
PLATELET # BLD AUTO: 229 K/UL (ref 140–400)
PMV BLD AUTO: 8.3 FL (ref 7.4–10.3)
POTASSIUM SERPL-SCNC: 4.1 MMOL/L (ref 3.3–5.1)
PROT SERPL-MCNC: 7.2 G/DL (ref 5.9–8.4)
PROT UR-MCNC: NEGATIVE MG/DL
RBC # BLD AUTO: 3.98 M/UL (ref 3.7–5.4)
RBC #/AREA URNS AUTO: <1 /HPF
SODIUM SERPL-SCNC: 138 MMOL/L (ref 136–144)
SP GR UR STRIP: 1 (ref 1–1.03)
UROBILINOGEN UR STRIP-ACNC: <2
VIT C UR-MCNC: NEGATIVE MG/DL
WBC # BLD AUTO: 7.4 K/UL (ref 4–11)
WBC #/AREA URNS AUTO: <1 /HPF

## 2017-08-07 PROCEDURE — 81001 URINALYSIS AUTO W/SCOPE: CPT

## 2017-08-07 PROCEDURE — 87086 URINE CULTURE/COLONY COUNT: CPT

## 2017-08-07 PROCEDURE — 36415 COLL VENOUS BLD VENIPUNCTURE: CPT

## 2017-08-07 PROCEDURE — 99213 OFFICE O/P EST LOW 20 MIN: CPT | Performed by: INTERNAL MEDICINE

## 2017-08-07 PROCEDURE — 80053 COMPREHEN METABOLIC PANEL: CPT

## 2017-08-07 PROCEDURE — 85025 COMPLETE CBC W/AUTO DIFF WBC: CPT

## 2017-08-07 PROCEDURE — 99212 OFFICE O/P EST SF 10 MIN: CPT | Performed by: INTERNAL MEDICINE

## 2017-08-07 NOTE — TELEPHONE ENCOUNTER
Patient started that has been on xarelto D#9. Urine is brownish, has been pushing fluids and urine is makenzie in color. Denies any frequency, urgency or burning. Denies being jaundiced. Last dose of xarelto was this morning.       Advised for her to GEO LI

## 2017-08-07 NOTE — TELEPHONE ENCOUNTER
To nursing, please tell patient lab done today 8/7/17-- CBC and CMP-results are ok. The urinalysis shows large blood and few bacteria--no white cells or red cells. We will await the urine culture which may take a few days.    [id number] Mariela Arshad for her to re-start the x

## 2017-08-07 NOTE — PROGRESS NOTES
Jerome Fuentes is a 60year old femalewho presents with     urinary symptoms.     Onset: yesterday  Started with: brown urine--\"then a dark makenzie color in the bottom\"  Symptoms:no dysuria, frequency, urgency  Symptoms do not include: no fever or flank pa bx-dysplasia  1989: CHEMOTHERAPY Right      Comment: Dr. Antonella Banegas: CHOLECYSTECTOMY  1997: COLONOSCOPY  2002: COLONOSCOPY  9/12, 7/17: COLONOSCOPY  9/12, 7/17: EGD  7/28/2017: EGD N/A      Comment: Procedure: ESOPHAGOGASTRODUODENOSCOPY,                COL is 130/80---was 142/92 by nurse on initial vitals. General: alert and in no acute distress, sclera ancteric. No skin jaundice. Lungs: clear to auscultation  Heart[de-identified] S1S2 normal without murmur  Abdomen: soft nontender without mass or organomegaly.    B

## 2017-08-07 NOTE — TELEPHONE ENCOUNTER
Spoke with patient this am and noted that symptoms have been reported to Dr David Sawant and that she had cbc, cmp , ua and culture done. Reviewed this with Dr Nik Hauser and concur with Dr David Sawant' recommendations. Patient confirms understanding.    Reviewed a

## 2017-08-08 ENCOUNTER — LAB ENCOUNTER (OUTPATIENT)
Dept: LAB | Facility: HOSPITAL | Age: 62
End: 2017-08-08
Attending: INTERNAL MEDICINE
Payer: COMMERCIAL

## 2017-08-08 ENCOUNTER — OFFICE VISIT (OUTPATIENT)
Dept: HEMATOLOGY/ONCOLOGY | Facility: HOSPITAL | Age: 62
End: 2017-08-08
Attending: INTERNAL MEDICINE
Payer: COMMERCIAL

## 2017-08-08 ENCOUNTER — TELEPHONE (OUTPATIENT)
Dept: HEMATOLOGY/ONCOLOGY | Facility: HOSPITAL | Age: 62
End: 2017-08-08

## 2017-08-08 VITALS
TEMPERATURE: 99 F | WEIGHT: 179 LBS | BODY MASS INDEX: 29.82 KG/M2 | HEART RATE: 94 BPM | DIASTOLIC BLOOD PRESSURE: 72 MMHG | RESPIRATION RATE: 16 BRPM | SYSTOLIC BLOOD PRESSURE: 137 MMHG | HEIGHT: 65 IN

## 2017-08-08 DIAGNOSIS — I82.512 CHRONIC DEEP VEIN THROMBOSIS (DVT) OF FEMORAL VEIN OF LEFT LOWER EXTREMITY (HCC): ICD-10-CM

## 2017-08-08 DIAGNOSIS — I82.512 CHRONIC DEEP VEIN THROMBOSIS (DVT) OF FEMORAL VEIN OF LEFT LOWER EXTREMITY (HCC): Primary | ICD-10-CM

## 2017-08-08 LAB — CK SERPL-CCNC: 42 U/L (ref 38–234)

## 2017-08-08 PROCEDURE — 36415 COLL VENOUS BLD VENIPUNCTURE: CPT

## 2017-08-08 PROCEDURE — 99212 OFFICE O/P EST SF 10 MIN: CPT | Performed by: INTERNAL MEDICINE

## 2017-08-08 PROCEDURE — 82550 ASSAY OF CK (CPK): CPT

## 2017-08-08 PROCEDURE — 99215 OFFICE O/P EST HI 40 MIN: CPT | Performed by: INTERNAL MEDICINE

## 2017-08-08 NOTE — TELEPHONE ENCOUNTER
Wale Saurabh reports that since resuming her xarelto last night, her urine once again appears dark color. \"It is not normal\". Will hold the xarelto today until seen by Dr Flores Rojas. Patient confirmed understanding. Will see patient at 4:30 today.

## 2017-08-08 NOTE — TELEPHONE ENCOUNTER
Adelita Bunn called to ask about taking her Xarelto this morning. Over the weekend her urine turned a brown iced tea color. Please see Dr Nicki Tarango phone calls. Pt saw Dr Juventino Tarango yesterday. She had labs drawn. Her urine is an makenzie color.  Dr Juventino Tarango

## 2017-08-08 NOTE — PROGRESS NOTES
Hematology Progress Note    Patient Name: Vahid Matias   YOB: 1955   Medical Record Number: [id number]    Consulting Physician: Stalin Tuttle MD  Referring Physician(s): Marta Sneed  Date of Visit: 8/08/2017     Chief Complaint: Anti level, no recurrent fevers or infections. Patient is severely concerned that she has continued anemia as she was found to have evidence of a bleeding source via endoscopy in 2004.  However, she denies any symptoms of chest pain, hemoptysis, or fatigue sugge Negative for cough, hemoptysis, chest pain, or dyspnea on exertion. Gastrointestinal: Negative for dysphagia, odynophagia, reflux symptoms, nausea, vomiting, change in bowel habits, diarrhea, constipation and abdominal pain.   Integument/breast: Negative f 08/07/2017 08:52 AM   BUN 9 08/07/2017 08:52 AM   CREATSERUM 0.81 08/07/2017 08:52 AM   GFRNAA >60 08/07/2017 08:52 AM   CA 10.1 08/07/2017 08:52 AM   ALB 3.7 08/07/2017 08:52 AM    08/07/2017 08:52 AM   K 4.1 08/07/2017 08:52 AM    08/07/2017 discovered second episode of a lower extremity clot without provoking symptoms whose original date of onset is unknown, she may benefit from indefinite anticoagulation therapy  --Therefore, initially recommended that the patient uses rivaroxaban for full 6 prevention  --However, if pt is intolerant of oral anticoagulation, will transition back to LMWH as she had no bleeding on this form of therapy  --Low suspicion for this condition being associated with her DVT    4.)  Right invasive breast cancer (ER-/[place]-)

## 2017-08-09 ENCOUNTER — TELEPHONE (OUTPATIENT)
Dept: HEMATOLOGY/ONCOLOGY | Facility: HOSPITAL | Age: 62
End: 2017-08-09

## 2017-08-09 ENCOUNTER — TELEPHONE (OUTPATIENT)
Dept: INTERNAL MEDICINE CLINIC | Facility: CLINIC | Age: 62
End: 2017-08-09

## 2017-08-09 ENCOUNTER — LAB ENCOUNTER (OUTPATIENT)
Dept: LAB | Facility: HOSPITAL | Age: 62
End: 2017-08-09
Attending: INTERNAL MEDICINE
Payer: COMMERCIAL

## 2017-08-09 DIAGNOSIS — I82.512 CHRONIC DEEP VEIN THROMBOSIS (DVT) OF FEMORAL VEIN OF LEFT LOWER EXTREMITY (HCC): ICD-10-CM

## 2017-08-09 DIAGNOSIS — I82.512 CHRONIC EMBOLISM AND THROMBOSIS OF LEFT FEMORAL VEIN (HCC): Primary | ICD-10-CM

## 2017-08-09 PROCEDURE — 83874 ASSAY OF MYOGLOBIN: CPT

## 2017-08-09 RX ORDER — ENOXAPARIN SODIUM 100 MG/ML
80 INJECTION SUBCUTANEOUS 2 TIMES DAILY
Qty: 48 ML | Refills: 0 | Status: SHIPPED | OUTPATIENT
Start: 2017-08-09 | End: 2017-08-10

## 2017-08-09 NOTE — TELEPHONE ENCOUNTER
Explained to patient that the eliquis prescription has a loading dose and a maintenance dose and thus the two doses. She stated she was aware of this but the pharmacy was confused with \"multiple prescriptions\" that were sent.  Also, they ultimately gave

## 2017-08-09 NOTE — TELEPHONE ENCOUNTER
LM regarding the prior auth required for lovenox, 4 weeks worth. I explained that the quantity of 4 weeks requires a prior auth.  If in the event we do not receive it in 24 hours (I initiated the PA at 11:00am) we will submit a prescription for a 10 day fi

## 2017-08-09 NOTE — TELEPHONE ENCOUNTER
fax from [employer] stating that the ENOXAPARIN 80 MG   Limits of 24ml has been exceeded for this medication and PA needs to be initiated, sent request to PA/

## 2017-08-09 NOTE — TELEPHONE ENCOUNTER
[phone]  I called pt. Urine culture on 8/7/17-neg. Pt saw Dr Lars Burton again today. She restarted xarelto yest and now is pink urine. He changed her xarelto to eliquis. He did CK--was normal. Also myoglobin--pending.    I discussed with

## 2017-08-09 NOTE — TELEPHONE ENCOUNTER
Sarah Atkins called and said she is having a problem receiving the medication prescribed to her yesterday. The pharmacy called and said they received more than one Rx for the same medication. Also the co pay is too high.  The patient is thinking maybe she should s

## 2017-08-09 NOTE — TELEPHONE ENCOUNTER
Elsa contacted and spoke with the pharmacist. Fredy Comp that the eliquis has been discontinued.  Confirms that they have received the scrip for lovenox but it appears to be needing a prior auth, their precert department will be contacting us soon to star

## 2017-08-09 NOTE — TELEPHONE ENCOUNTER
To nursing, I note message. Pt requests female urologist. Please give her name and number of Dr Christiano Lanier. Thanks.

## 2017-08-09 NOTE — TELEPHONE ENCOUNTER
Called patient and gave address and number for DR. Kavon [address] #[phone] - verbalized understanding

## 2017-08-09 NOTE — TELEPHONE ENCOUNTER
Elsa called and said they received 7 Rx's yesterday and they need clarification of which is the correct Rx before they can even give the patient a correct price, please advise

## 2017-08-09 NOTE — TELEPHONE ENCOUNTER
The pt is requesting a recommendation from Dr. Oliva Boggs for a female urologist. Informed her that this will be reviewed with Dr. Oliva Boggs and she will be called back with more information. Message routed to Dr. Oliva Boggs for review.

## 2017-08-10 ENCOUNTER — TELEPHONE (OUTPATIENT)
Dept: HEMATOLOGY/ONCOLOGY | Facility: HOSPITAL | Age: 62
End: 2017-08-10

## 2017-08-10 ENCOUNTER — HOSPITAL ENCOUNTER (OUTPATIENT)
Dept: CT IMAGING | Age: 62
Discharge: HOME | End: 2017-08-10
Attending: INTERNAL MEDICINE
Payer: COMMERCIAL

## 2017-08-10 DIAGNOSIS — R31.0 GROSS HEMATURIA: ICD-10-CM

## 2017-08-10 PROCEDURE — 74178 CT ABD&PLV WO CNTR FLWD CNTR: CPT | Performed by: INTERNAL MEDICINE

## 2017-08-11 ENCOUNTER — TELEPHONE (OUTPATIENT)
Dept: INTERNAL MEDICINE CLINIC | Facility: CLINIC | Age: 62
End: 2017-08-11

## 2017-08-11 LAB — MYOGLOBIN, URINE: <1 MG/L

## 2017-08-11 NOTE — TELEPHONE ENCOUNTER
Called patient and relayed Dr. Raul Morales message that her results are okay and that there are no kidney stones or kidney tumors. Patient wanted to know if there was anything wrong with her bladder.  Informed patient that Dr. Jeff Ragsdale didn't say anything about her bladde

## 2017-08-11 NOTE — TELEPHONE ENCOUNTER
To nursing, please tell patient the CT urogram done on 8/10/17–results are okay. No kidney stones or kidney tumors. Go ahead and see Dr. Domingo Smart the urologist for cystoscopy. Thanks.

## 2017-08-22 ENCOUNTER — TELEPHONE (OUTPATIENT)
Dept: INTERNAL MEDICINE CLINIC | Facility: CLINIC | Age: 62
End: 2017-08-22

## 2017-08-22 DIAGNOSIS — R31.0 HEMATURIA, GROSS: Primary | ICD-10-CM

## 2017-08-22 NOTE — TELEPHONE ENCOUNTER
Pt saw Dr. Perry Roy today, who referred pt to see Dr. Cheryl Craft. Pt is asking if  would order blood test to check her kidney function.     Tasked to nursing

## 2017-08-23 NOTE — TELEPHONE ENCOUNTER
Nursing–please tell patient I entered order for renal function panel. Tell her I spoke to Dr. Perry Roy last week and she felt she should see a nephrologist for opinion about the recent blood in the urine. Go ahead and see Dr Cheryl Craft. Thanks.         Note

## 2017-08-23 NOTE — TELEPHONE ENCOUNTER
Called patient and relayed Dr. Tasha Nicholson message that Dr. Fabian Mercado has spoken to Dr. Grecia Brown last week and she felt she should see a nephrologist for an opinion about the recent blood in her urine; Go ahead and see Dr. Hany Fall.  Informed her that an order for renal funct

## 2017-08-26 ENCOUNTER — LAB ENCOUNTER (OUTPATIENT)
Dept: LAB | Age: 62
End: 2017-08-26
Attending: INTERNAL MEDICINE
Payer: COMMERCIAL

## 2017-08-26 DIAGNOSIS — D64.9 MILD ANEMIA: ICD-10-CM

## 2017-08-26 DIAGNOSIS — R31.0 HEMATURIA, GROSS: ICD-10-CM

## 2017-08-26 LAB
ALBUMIN SERPL BCP-MCNC: 3.7 G/DL (ref 3.5–4.8)
ANION GAP SERPL CALC-SCNC: 7 MMOL/L (ref 0–18)
BASOPHILS # BLD: 0 K/UL (ref 0–0.2)
BASOPHILS NFR BLD: 1 %
BUN SERPL-MCNC: 8 MG/DL (ref 8–20)
BUN/CREAT SERPL: 10.4 (ref 10–20)
CALCIUM SERPL-MCNC: 9.6 MG/DL (ref 8.5–10.5)
CHLORIDE SERPL-SCNC: 106 MMOL/L (ref 95–110)
CO2 SERPL-SCNC: 26 MMOL/L (ref 22–32)
CREAT SERPL-MCNC: 0.77 MG/DL (ref 0.5–1.5)
EOSINOPHIL # BLD: 0.2 K/UL (ref 0–0.7)
EOSINOPHIL NFR BLD: 3 %
ERYTHROCYTE [DISTWIDTH] IN BLOOD BY AUTOMATED COUNT: 14.6 % (ref 11–15)
GLUCOSE SERPL-MCNC: 95 MG/DL (ref 70–99)
HCT VFR BLD AUTO: 37 % (ref 35–48)
HGB BLD-MCNC: 12.4 G/DL (ref 12–16)
LYMPHOCYTES # BLD: 0.9 K/UL (ref 1–4)
LYMPHOCYTES NFR BLD: 18 %
MCH RBC QN AUTO: 29.4 PG (ref 27–32)
MCHC RBC AUTO-ENTMCNC: 33.5 G/DL (ref 32–37)
MCV RBC AUTO: 87.8 FL (ref 80–100)
MONOCYTES # BLD: 0.4 K/UL (ref 0–1)
MONOCYTES NFR BLD: 7 %
NEUTROPHILS # BLD AUTO: 3.8 K/UL (ref 1.8–7.7)
NEUTROPHILS NFR BLD: 72 %
OSMOLALITY UR CALC.SUM OF ELEC: 286 MOSM/KG (ref 275–295)
PHOSPHATE SERPL-MCNC: 3.4 MG/DL (ref 2.4–4.7)
PLATELET # BLD AUTO: 233 K/UL (ref 140–400)
PMV BLD AUTO: 8.2 FL (ref 7.4–10.3)
POTASSIUM SERPL-SCNC: 4.1 MMOL/L (ref 3.3–5.1)
RBC # BLD AUTO: 4.22 M/UL (ref 3.7–5.4)
SODIUM SERPL-SCNC: 139 MMOL/L (ref 136–144)
WBC # BLD AUTO: 5.3 K/UL (ref 4–11)

## 2017-08-26 PROCEDURE — 36415 COLL VENOUS BLD VENIPUNCTURE: CPT

## 2017-08-26 PROCEDURE — 80069 RENAL FUNCTION PANEL: CPT

## 2017-08-26 PROCEDURE — 85025 COMPLETE CBC W/AUTO DIFF WBC: CPT

## 2017-08-28 ENCOUNTER — TELEPHONE (OUTPATIENT)
Dept: INTERNAL MEDICINE CLINIC | Facility: CLINIC | Age: 62
End: 2017-08-28

## 2017-08-28 NOTE — TELEPHONE ENCOUNTER
I called pt--8/26/17–CBC, renal function panel–normal.    Pt notes she stopped the anticoagulation. She is not taking Xarelto or Eliquis.   She saw Dr. Sandie Benjamin, a cardiologist at [facility] who advised patient not need anticoagulation in

## 2017-09-13 ENCOUNTER — APPOINTMENT (OUTPATIENT)
Dept: HEMATOLOGY/ONCOLOGY | Facility: HOSPITAL | Age: 62
End: 2017-09-13
Payer: COMMERCIAL

## 2017-09-14 ENCOUNTER — OFFICE VISIT (OUTPATIENT)
Dept: HEMATOLOGY/ONCOLOGY | Facility: HOSPITAL | Age: 62
End: 2017-09-14
Attending: INTERNAL MEDICINE
Payer: COMMERCIAL

## 2017-09-14 VITALS
SYSTOLIC BLOOD PRESSURE: 136 MMHG | HEART RATE: 84 BPM | RESPIRATION RATE: 16 BRPM | TEMPERATURE: 98 F | WEIGHT: 177 LBS | BODY MASS INDEX: 29.49 KG/M2 | DIASTOLIC BLOOD PRESSURE: 69 MMHG | HEIGHT: 65 IN

## 2017-09-14 DIAGNOSIS — I82.512 CHRONIC DEEP VEIN THROMBOSIS (DVT) OF FEMORAL VEIN OF LEFT LOWER EXTREMITY (HCC): Primary | ICD-10-CM

## 2017-09-14 PROCEDURE — 99212 OFFICE O/P EST SF 10 MIN: CPT | Performed by: INTERNAL MEDICINE

## 2017-09-14 PROCEDURE — 99215 OFFICE O/P EST HI 40 MIN: CPT | Performed by: INTERNAL MEDICINE

## 2017-09-14 RX ORDER — RIVAROXABAN 10 MG/1
10 TABLET, FILM COATED ORAL
COMMUNITY
Start: 2017-08-30

## 2017-09-14 NOTE — PROGRESS NOTES
Hematology Progress Note    Patient Name: Edna Pizarro   YOB: 1955   Medical Record Number: [id number]    Consulting Physician: Shaka Gamino MD  Referring Physician(s): Carlos Sullivan MD  Date of Visit: 9/14/2017     Chief Complaint: level, no recurrent fevers or infections. Patient is severely concerned that she has continued anemia as she was found to have evidence of a bleeding source via endoscopy in 2004.  However, she denies any symptoms of chest pain, hemoptysis, or fatigue sugge tablet Rfl: 2   simvastatin 10 MG Oral Tab Take 1 tablet (10 mg total) by mouth every other day. Disp: 45 tablet Rfl: 3   lisinopril 40 MG Oral Tab Take 1 tablet (40 mg total) by mouth daily.  Disp: 90 tablet Rfl: 3   omeprazole 20 MG Oral Capsule Delayed R No hepatosplenomegaly. No palpable mass. Extremities: No edema or calf tenderness. Neurological: Grossly intact.       Labs:      Lab Results  Component Value Date/Time   WBC 5.3 08/26/2017 08:23 AM   RBC 4.22 08/26/2017 08:23 AM   HGB 12.4 08/26/2017 08 recommending prophylactic dose of rivaroxaban (10 mg/daily)    --Discussed with patient that her clot is nonocclusive which is a reassuring finding as blood flow is not completely impeded    --Patient successfully completed colonoscopy without any bleeding 400 showing she is adequately replete in these vitamins    3.) History of left ER-/MI- breast DCIS in 2013    --status post mastectomy; patient self reports that her tumor was ER/MI negative, performed at an outside hospital  --considering the negative hor

## 2017-09-30 ENCOUNTER — LAB ENCOUNTER (OUTPATIENT)
Dept: LAB | Age: 62
End: 2017-09-30
Attending: INTERNAL MEDICINE
Payer: COMMERCIAL

## 2017-09-30 DIAGNOSIS — N02.9 BENIGN FAMILIAL HEMATURIA: Primary | ICD-10-CM

## 2017-09-30 DIAGNOSIS — I10 ESSENTIAL HYPERTENSION, MALIGNANT: ICD-10-CM

## 2017-09-30 DIAGNOSIS — R31.9 HEMATURIA, UNSPECIFIED TYPE: ICD-10-CM

## 2017-09-30 DIAGNOSIS — I10 HYPERTENSION, UNSPECIFIED TYPE: ICD-10-CM

## 2017-09-30 PROCEDURE — 86255 FLUORESCENT ANTIBODY SCREEN: CPT

## 2017-09-30 PROCEDURE — 36415 COLL VENOUS BLD VENIPUNCTURE: CPT

## 2017-09-30 PROCEDURE — 83876 ASSAY MYELOPEROXIDASE: CPT

## 2017-09-30 PROCEDURE — 86060 ANTISTREPTOLYSIN O TITER: CPT

## 2017-09-30 PROCEDURE — 83516 IMMUNOASSAY NONANTIBODY: CPT

## 2017-09-30 PROCEDURE — 86225 DNA ANTIBODY NATIVE: CPT

## 2017-09-30 PROCEDURE — 86160 COMPLEMENT ANTIGEN: CPT

## 2017-09-30 PROCEDURE — 82043 UR ALBUMIN QUANTITATIVE: CPT

## 2017-09-30 PROCEDURE — 86038 ANTINUCLEAR ANTIBODIES: CPT

## 2017-09-30 PROCEDURE — 86334 IMMUNOFIX E-PHORESIS SERUM: CPT

## 2017-09-30 PROCEDURE — 84156 ASSAY OF PROTEIN URINE: CPT

## 2017-09-30 PROCEDURE — 81003 URINALYSIS AUTO W/O SCOPE: CPT

## 2017-09-30 PROCEDURE — 82570 ASSAY OF URINE CREATININE: CPT

## 2017-09-30 PROCEDURE — 86039 ANTINUCLEAR ANTIBODIES (ANA): CPT

## 2017-09-30 PROCEDURE — 84165 PROTEIN E-PHORESIS SERUM: CPT

## 2017-09-30 PROCEDURE — 80069 RENAL FUNCTION PANEL: CPT

## 2018-05-03 RX ORDER — OMEPRAZOLE 20 MG/1
CAPSULE, DELAYED RELEASE ORAL
Qty: 90 CAPSULE | Refills: 3 | Status: SHIPPED | OUTPATIENT
Start: 2018-05-03 | End: 2019-03-23

## 2018-05-03 RX ORDER — LISINOPRIL 40 MG/1
TABLET ORAL
Qty: 90 TABLET | Refills: 3 | Status: SHIPPED | OUTPATIENT
Start: 2018-05-03 | End: 2019-03-23

## 2018-05-03 RX ORDER — SIMVASTATIN 10 MG
TABLET ORAL
Qty: 45 TABLET | Refills: 3 | Status: SHIPPED | OUTPATIENT
Start: 2018-05-03 | End: 2019-03-23

## 2018-05-04 ENCOUNTER — APPOINTMENT (OUTPATIENT)
Dept: LAB | Age: 63
End: 2018-05-04
Attending: INTERNAL MEDICINE
Payer: COMMERCIAL

## 2018-05-04 ENCOUNTER — OFFICE VISIT (OUTPATIENT)
Dept: INTERNAL MEDICINE CLINIC | Facility: CLINIC | Age: 63
End: 2018-05-04

## 2018-05-04 VITALS
HEART RATE: 80 BPM | BODY MASS INDEX: 30.66 KG/M2 | SYSTOLIC BLOOD PRESSURE: 128 MMHG | DIASTOLIC BLOOD PRESSURE: 80 MMHG | TEMPERATURE: 99 F | WEIGHT: 184 LBS | HEIGHT: 65 IN

## 2018-05-04 DIAGNOSIS — R31.0 GROSS HEMATURIA: ICD-10-CM

## 2018-05-04 DIAGNOSIS — R31.0 GROSS HEMATURIA: Primary | ICD-10-CM

## 2018-05-04 PROBLEM — I87.1 MAY-THURNER SYNDROME: Status: ACTIVE | Noted: 2018-05-04

## 2018-05-04 PROCEDURE — 81001 URINALYSIS AUTO W/SCOPE: CPT

## 2018-05-04 PROCEDURE — 99214 OFFICE O/P EST MOD 30 MIN: CPT | Performed by: INTERNAL MEDICINE

## 2018-05-04 PROCEDURE — 99213 OFFICE O/P EST LOW 20 MIN: CPT | Performed by: INTERNAL MEDICINE

## 2018-05-04 PROCEDURE — 87086 URINE CULTURE/COLONY COUNT: CPT

## 2018-05-04 NOTE — PROGRESS NOTES
Sherin Stringer is a 60year old female who presents for     urinary symptoms. \"I started that hematuria again yesterday. Tea colored light with yellow, with a little red tinge\". Occurred with one void yest, then clear urine since.    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 Espinoza Franks at [facility]. He rx xarelto--on hold since yest 5/3/18. Factor 5 Leiden--+hetereozygous. Pt saw hematologist Dr Kathy Moran 2017.     Hx Mild anemia: hgb 12.4 in 8/2017.  Hgb 11.7 on 5/27/17--down from 12.9 in 4/20 OMEPRAZOLE 20 MG Oral Capsule Delayed Release TAKE 1 CAPSULE BY MOUTH  DAILY Disp: 90 capsule Rfl: 3   SIMVASTATIN 10 MG Oral Tab TAKE 1 TABLET BY MOUTH  EVERY OTHER DAY Disp: 45 tablet Rfl: 3   XARELTO 10 MG Oral Tab  Disp:  Rfl:    ALPRAZolam 0.25 MG O

## 2018-05-06 ENCOUNTER — TELEPHONE (OUTPATIENT)
Dept: INTERNAL MEDICINE CLINIC | Facility: CLINIC | Age: 63
End: 2018-05-06

## 2018-05-06 NOTE — TELEPHONE ENCOUNTER
To nursing,   please tell pt the urine testing on 5/4/18-confirms microscopic blood in urine--but no urinary tract infection found to explain it.    Urinalysis shows large blood, 12 RBCs per high powered field under the microscope (normal is 3 or <).  the u

## 2018-05-07 NOTE — TELEPHONE ENCOUNTER
Pt. Is requesting that her records pertaining to the urologist & nephrologist, along with her labs be sent to the new urologist Dr. Greyson Krishna  Ph. # [phone]   Fax.  # [phone]       David Lopez   [phone]   Routed to clinical

## 2018-05-07 NOTE — TELEPHONE ENCOUNTER
Called patient. Told her she would have to request nephrology and urology to send their records and testing. I do not have access to them or their OV notes. She verbalized understanding. I reviewed with patient what I could send.    Dr Dalia Williamson note

## 2018-05-30 RX ORDER — ALPRAZOLAM 0.25 MG/1
TABLET ORAL
Qty: 30 TABLET | Refills: 2 | Status: SHIPPED
Start: 2018-05-30 | End: 2021-01-20

## 2018-05-30 NOTE — TELEPHONE ENCOUNTER
Pt. Is requesting a refill on Alprazolam ph.  # [phone]  Send to [facility]  [phone]   Routed to Rx

## 2018-05-30 NOTE — TELEPHONE ENCOUNTER
To nursing, okay to fax to [facility] refill for Alprazolam 0.25 mg once daily if needed for anxiety #30 with 2 refills. Rx is in my out pile. Thanks.

## 2018-10-10 ENCOUNTER — OFFICE VISIT (OUTPATIENT)
Dept: INTERNAL MEDICINE CLINIC | Facility: CLINIC | Age: 63
End: 2018-10-10
Payer: COMMERCIAL

## 2018-10-10 ENCOUNTER — TELEPHONE (OUTPATIENT)
Dept: INTERNAL MEDICINE CLINIC | Facility: CLINIC | Age: 63
End: 2018-10-10

## 2018-10-10 ENCOUNTER — APPOINTMENT (OUTPATIENT)
Dept: LAB | Age: 63
End: 2018-10-10
Attending: INTERNAL MEDICINE
Payer: COMMERCIAL

## 2018-10-10 VITALS
DIASTOLIC BLOOD PRESSURE: 80 MMHG | OXYGEN SATURATION: 98 % | SYSTOLIC BLOOD PRESSURE: 122 MMHG | HEIGHT: 65 IN | BODY MASS INDEX: 32.65 KG/M2 | TEMPERATURE: 100 F | HEART RATE: 93 BPM | WEIGHT: 196 LBS

## 2018-10-10 DIAGNOSIS — E55.9 VITAMIN D DEFICIENCY: ICD-10-CM

## 2018-10-10 DIAGNOSIS — Z95.828 S/P INSERTION OF ILIAC ARTERY STENT: ICD-10-CM

## 2018-10-10 DIAGNOSIS — E78.1 HYPERTRIGLYCERIDEMIA: ICD-10-CM

## 2018-10-10 DIAGNOSIS — Z87.448 HISTORY OF GROSS HEMATURIA: ICD-10-CM

## 2018-10-10 DIAGNOSIS — I87.1 MAY-THURNER SYNDROME: ICD-10-CM

## 2018-10-10 DIAGNOSIS — Z00.00 ANNUAL PHYSICAL EXAM: Primary | ICD-10-CM

## 2018-10-10 DIAGNOSIS — I10 ESSENTIAL HYPERTENSION: ICD-10-CM

## 2018-10-10 DIAGNOSIS — F32.A MILD DEPRESSION: ICD-10-CM

## 2018-10-10 DIAGNOSIS — D68.51 FACTOR 5 LEIDEN MUTATION, HETEROZYGOUS (HCC): ICD-10-CM

## 2018-10-10 PROCEDURE — 36415 COLL VENOUS BLD VENIPUNCTURE: CPT

## 2018-10-10 PROCEDURE — 90686 IIV4 VACC NO PRSV 0.5 ML IM: CPT | Performed by: INTERNAL MEDICINE

## 2018-10-10 PROCEDURE — 99396 PREV VISIT EST AGE 40-64: CPT | Performed by: INTERNAL MEDICINE

## 2018-10-10 PROCEDURE — 84478 ASSAY OF TRIGLYCERIDES: CPT

## 2018-10-10 PROCEDURE — 90471 IMMUNIZATION ADMIN: CPT | Performed by: INTERNAL MEDICINE

## 2018-10-10 PROCEDURE — 82306 VITAMIN D 25 HYDROXY: CPT

## 2018-10-10 RX ORDER — MAG HYDROX/ALUMINUM HYD/SIMETH 400-400-40
1 SUSPENSION, ORAL (FINAL DOSE FORM) ORAL DAILY
Qty: 1 CAPSULE | Refills: 0 | COMMUNITY
Start: 2018-10-10 | End: 2019-11-27

## 2018-10-10 RX ORDER — VENLAFAXINE HYDROCHLORIDE 37.5 MG/1
37.5 CAPSULE, EXTENDED RELEASE ORAL DAILY
Qty: 90 CAPSULE | Refills: 3 | Status: SHIPPED | OUTPATIENT
Start: 2018-10-10 | End: 2019-02-07

## 2018-10-10 NOTE — TELEPHONE ENCOUNTER
To nursing, please tell patient lab done 10/10/18–   1) triglycerides are normal at 138 .  2) vitamin D level is 54.6. Goal is to be over 30.   My recommendation is that she can cut back from vitamin D 5000 units daily to 2000 units daily and this would st

## 2018-10-10 NOTE — PROGRESS NOTES
Jayden Case is a 58year old female who presents for     Annual physical    No further gross hematuria (5/4/18 visit). Didn't go to urologist Dr. Chun Engel she had seen in past b/c she was on leave. Saw Dr. Sree Rebollar urologist at [facility].  She had pt do IVP a 2004/2009/2012    EGD neg  2004; duod bx neg for sprue 2009   • History of vein stripping    • Hyperlipidemia     elevated cholesterol and triglycerides   • Hypertension, essential 1993   • Iron deficiency anemia 2004    endoscopy (-)   • Left leg DVT ([address] Social History:   Social History    Tobacco Use      Smoking status: Never Smoker      Smokeless tobacco: Never Used    Alcohol use: Yes      Comment: socially    Drug use: Not on file         Allergies:    Adhesive Tape           RASH    Comment:Othe iliac vein stent placed 2017 Dr. Kinjal Lockwood at [facility]. He rx xarelto 10 mg daily.     Factor 5 Leiden hetereozygous-saw hematologist Dr Eduarda Boo 2017. on xarelto.     Hypertension -on lisinopril 40 mg daily --stopped atenolol 12.5 mg daily in 20 Future  - VITAMIN D, 25-HYDROXY; Future        Current Outpatient Medications:  Venlafaxine HCl ER (EFFEXOR XR) 37.5 MG Oral Capsule SR 24 Hr Take 1 capsule (37.5 mg total) by mouth daily.  Disp: 90 capsule Rfl: 3   Cholecalciferol (VITAMIN D3) 5000 units O

## 2018-10-19 ENCOUNTER — TELEPHONE (OUTPATIENT)
Dept: INTERNAL MEDICINE CLINIC | Facility: CLINIC | Age: 63
End: 2018-10-19

## 2018-10-19 NOTE — TELEPHONE ENCOUNTER
To nursing,   please tell patient I received Fax from Dr. Aleks Lay in [facility]--MRI left hip 10/17/18– shows some mild hip arthritis (left hip mild joint narrowing and chondral thinning).     The MRI showed an incidental finding of diverticulosis whic

## 2018-10-22 ENCOUNTER — MED REC SCAN ONLY (OUTPATIENT)
Dept: INTERNAL MEDICINE CLINIC | Facility: CLINIC | Age: 63
End: 2018-10-22

## 2018-10-22 NOTE — TELEPHONE ENCOUNTER
Pt notified in addition to the results of MRI of left hip  It  Was noted that pt has diverticulosis  - has been seen on past colonoscopies   No specific tx needed except for hi fiber diet / DRF

## 2018-11-27 NOTE — TELEPHONE ENCOUNTER
To MD:  The above refill request is for a controlled substance. Please indicate yes or no to refill 30 days supply plus one refill. If more refills are appropriate, please indicate quantity  To DR. Haddad No With exercise the heart rate increases and that in turn suppresses the premature heartbeat (PVCs) so exercise is good.  Anesthesia or sedation will have no bearing on PVCs.  There are not a risk factor.

## 2019-02-07 ENCOUNTER — OFFICE VISIT (OUTPATIENT)
Dept: INTERNAL MEDICINE CLINIC | Facility: CLINIC | Age: 64
End: 2019-02-07
Payer: COMMERCIAL

## 2019-02-07 VITALS
WEIGHT: 190.19 LBS | OXYGEN SATURATION: 97 % | HEIGHT: 65 IN | DIASTOLIC BLOOD PRESSURE: 82 MMHG | BODY MASS INDEX: 31.69 KG/M2 | SYSTOLIC BLOOD PRESSURE: 140 MMHG | HEART RATE: 98 BPM | TEMPERATURE: 99 F

## 2019-02-07 DIAGNOSIS — I82.412 DEEP VEIN THROMBOSIS (DVT) OF FEMORAL VEIN OF LEFT LOWER EXTREMITY, UNSPECIFIED CHRONICITY (HCC): ICD-10-CM

## 2019-02-07 DIAGNOSIS — J02.9 SORE THROAT: Primary | ICD-10-CM

## 2019-02-07 PROCEDURE — 99212 OFFICE O/P EST SF 10 MIN: CPT | Performed by: INTERNAL MEDICINE

## 2019-02-07 PROCEDURE — 99214 OFFICE O/P EST MOD 30 MIN: CPT | Performed by: INTERNAL MEDICINE

## 2019-02-07 RX ORDER — AZITHROMYCIN 250 MG/1
TABLET, FILM COATED ORAL
Qty: 6 TABLET | Refills: 0 | Status: SHIPPED | OUTPATIENT
Start: 2019-02-07 | End: 2019-11-27

## 2019-02-07 NOTE — PROGRESS NOTES
Rowan Biggs is a 58year old female. HPI:   Patient presents with:  Sore Throat: onset: 5 weeks. initially had post nasal drip, sore throat, dry. Was using flonase. sx lessened. Cough and post nasal drip stopped 1 week ago.  states she feels as if her • Chapman's esophagus     EGD 9/12 Dr. Wil Zapata, barretts with neg bx   • BPV (benign positional vertigo) 2008   • Breast cancer, left ([facility] 2013    L mastectomy/ implant (DCIS) Dr Bobby Doe   • Breast cancer, right [facility]) 1989    R mastectomy/implan (Oral)   Ht 5' 5\" (1.651 m)   Wt 190 lb 3.2 oz (86.3 kg)   SpO2 97%   BMI 31.65 kg/m²     GENERAL:  well developed, well nourished, in no apparent distress  SKIN:  no rashes , no suspicious lesions  HEENT: atraumatic. TM's normal. Canals clear.   There is

## 2019-03-24 RX ORDER — LISINOPRIL 40 MG/1
TABLET ORAL
Qty: 90 TABLET | Refills: 3 | Status: SHIPPED | OUTPATIENT
Start: 2019-03-24 | End: 2020-01-10

## 2019-03-24 RX ORDER — SIMVASTATIN 10 MG
TABLET ORAL
Qty: 45 TABLET | Refills: 3 | Status: SHIPPED | OUTPATIENT
Start: 2019-03-24 | End: 2020-01-10

## 2019-03-24 RX ORDER — OMEPRAZOLE 20 MG/1
CAPSULE, DELAYED RELEASE ORAL
Qty: 90 CAPSULE | Refills: 3 | Status: SHIPPED | OUTPATIENT
Start: 2019-03-24 | End: 2020-01-10

## 2019-06-20 ENCOUNTER — OFFICE VISIT (OUTPATIENT)
Dept: OTOLARYNGOLOGY | Facility: CLINIC | Age: 64
End: 2019-06-20
Payer: COMMERCIAL

## 2019-06-20 ENCOUNTER — OFFICE VISIT (OUTPATIENT)
Dept: AUDIOLOGY | Facility: CLINIC | Age: 64
End: 2019-06-20
Payer: COMMERCIAL

## 2019-06-20 VITALS
DIASTOLIC BLOOD PRESSURE: 82 MMHG | TEMPERATURE: 99 F | WEIGHT: 188 LBS | BODY MASS INDEX: 30.95 KG/M2 | HEIGHT: 65.5 IN | SYSTOLIC BLOOD PRESSURE: 142 MMHG

## 2019-06-20 DIAGNOSIS — H93.13 TINNITUS OF BOTH EARS: Primary | ICD-10-CM

## 2019-06-20 DIAGNOSIS — H90.12 CONDUCTIVE HEARING LOSS OF LEFT EAR WITH UNRESTRICTED HEARING OF RIGHT EAR: Primary | ICD-10-CM

## 2019-06-20 DIAGNOSIS — J30.89 NON-SEASONAL ALLERGIC RHINITIS, UNSPECIFIED TRIGGER: ICD-10-CM

## 2019-06-20 PROCEDURE — 92567 TYMPANOMETRY: CPT | Performed by: AUDIOLOGIST

## 2019-06-20 PROCEDURE — 92557 COMPREHENSIVE HEARING TEST: CPT | Performed by: AUDIOLOGIST

## 2019-06-20 PROCEDURE — 99212 OFFICE O/P EST SF 10 MIN: CPT | Performed by: OTOLARYNGOLOGY

## 2019-06-20 PROCEDURE — 99213 OFFICE O/P EST LOW 20 MIN: CPT | Performed by: OTOLARYNGOLOGY

## 2019-06-20 NOTE — PROGRESS NOTES
Ester Pérez is a 58year old female.  Patient presents with:  Sore Throat: since Tuesday  Nose Problem: nasal congestion   Ear Problem: ringing in the left ear for 2 weeks     HPI:   For the last 2 weeks has been experiencing problems with nasal congest heterozygous ([facility] 07/2017   • Gastritis    • Hematuria 2017    gross hematuria on xarelto. 8/2017-CT urogram-ok. cysto, urine cytology neg (Dr Radha Gonzales).  Dr Mccarty Areas 2017-not glomerular source; ihsan Smalls December 5/2018-IVP,CT abd pelvis, cysto ok at [facility] Normal Orientation - Oriented to time, place, person & situation. Appropriate mood and affect. Lymph Detail Normal Submental. Submandibular. Anterior cervical. Posterior cervical. Supraclavicular.    Eyes Normal Conjunctiva - Right: Normal, Left: Normal.

## 2019-06-21 NOTE — PROGRESS NOTES
AUDIOLOGY REPORT      Shannan Tirado is a 58year old female     Referring Provider: Neda Gonzales   YOB: 1955  Medical Record: [id number]      Patient Hearing History:   Patient reported  Tinnitus.      Otoscopic Inspection:  Right ear:  No

## 2019-11-19 ENCOUNTER — OFFICE VISIT (OUTPATIENT)
Dept: OTOLARYNGOLOGY | Facility: CLINIC | Age: 64
End: 2019-11-19
Payer: COMMERCIAL

## 2019-11-19 VITALS
TEMPERATURE: 99 F | SYSTOLIC BLOOD PRESSURE: 144 MMHG | DIASTOLIC BLOOD PRESSURE: 80 MMHG | BODY MASS INDEX: 31.99 KG/M2 | WEIGHT: 192 LBS | HEIGHT: 65 IN

## 2019-11-19 DIAGNOSIS — H93.13 TINNITUS OF BOTH EARS: Primary | ICD-10-CM

## 2019-11-19 PROCEDURE — 99212 OFFICE O/P EST SF 10 MIN: CPT | Performed by: OTOLARYNGOLOGY

## 2019-11-19 PROCEDURE — 99213 OFFICE O/P EST LOW 20 MIN: CPT | Performed by: OTOLARYNGOLOGY

## 2019-11-19 RX ORDER — ATENOLOL 25 MG/1
25 TABLET ORAL DAILY
COMMUNITY
End: 2019-11-27

## 2019-11-20 NOTE — PROGRESS NOTES
Manish Ford is a 71year old female. Patient presents with:  Ear Problem: c/o weird noise at her left ear for 4 days    HPI:   She is experienced a few episodes of loud noise in her left ear. She is not having any pain.   She does not feel like her hea glomerular source; eval Dr Ross Age 5/2018-IVP,CT abd pelvis, cysto ok at [facility].   • Hemorrhoids 2002    colonoscopy 2002   • Hiatal hernia    • History of gastroscopy 2004/2009/2012    EGD neg  2004; duod bx neg for sprue 2009   • History of vein str Conjunctiva - Right: Normal, Left: Normal. Pupil - Right: Normal, Left: Normal.    Ears Normal Inspection - Right: Normal, Left: Normal. Canal - Left: Normal. TM - Right: Normal, Left: Normal.  Ear canals and tympanic memories clear        ASSESSMENT AND P

## 2019-11-27 ENCOUNTER — OFFICE VISIT (OUTPATIENT)
Dept: INTERNAL MEDICINE CLINIC | Facility: CLINIC | Age: 64
End: 2019-11-27
Payer: COMMERCIAL

## 2019-11-27 VITALS
HEIGHT: 65 IN | BODY MASS INDEX: 31.49 KG/M2 | TEMPERATURE: 98 F | DIASTOLIC BLOOD PRESSURE: 80 MMHG | HEART RATE: 76 BPM | WEIGHT: 189 LBS | SYSTOLIC BLOOD PRESSURE: 130 MMHG

## 2019-11-27 DIAGNOSIS — E55.9 VITAMIN D DEFICIENCY: ICD-10-CM

## 2019-11-27 DIAGNOSIS — Z00.00 ANNUAL PHYSICAL EXAM: Primary | ICD-10-CM

## 2019-11-27 DIAGNOSIS — F32.A ANXIETY AND DEPRESSION: ICD-10-CM

## 2019-11-27 DIAGNOSIS — Z85.3 HISTORY OF BREAST CANCER: ICD-10-CM

## 2019-11-27 DIAGNOSIS — D68.51 FACTOR 5 LEIDEN MUTATION, HETEROZYGOUS (HCC): ICD-10-CM

## 2019-11-27 DIAGNOSIS — Z86.718 HISTORY OF DVT OF LOWER EXTREMITY: ICD-10-CM

## 2019-11-27 DIAGNOSIS — F41.9 ANXIETY AND DEPRESSION: ICD-10-CM

## 2019-11-27 DIAGNOSIS — K44.9 HIATAL HERNIA: ICD-10-CM

## 2019-11-27 DIAGNOSIS — I10 ESSENTIAL HYPERTENSION: ICD-10-CM

## 2019-11-27 DIAGNOSIS — I87.1 MAY-THURNER SYNDROME: ICD-10-CM

## 2019-11-27 DIAGNOSIS — E78.2 HYPERLIPIDEMIA, MIXED: ICD-10-CM

## 2019-11-27 PROCEDURE — 99397 PER PM REEVAL EST PAT 65+ YR: CPT | Performed by: INTERNAL MEDICINE

## 2019-11-27 RX ORDER — ATENOLOL 25 MG/1
25 TABLET ORAL DAILY
Qty: 90 TABLET | Refills: 3 | Status: SHIPPED | OUTPATIENT
Start: 2019-11-27 | End: 2020-01-10

## 2019-11-27 NOTE — PROGRESS NOTES
Mundo Cuba is a 71year old female who presents for     Annual physical    HTN--home /80. She went back on atenolol 25 mg on her own ~5/2019 b/c BP was \"high\". Continued on lisinopril 40 mg.     Anxiety & depression--took one dose of effexor rx colonoscopy 1997-divertic and int hemorrhoids   • DVT, bilateral lower limbs [facility]) age 22    DVT both LEs age 22 after had gb removed when on birth control pills.     • Esophagitis    • Factor 5 Leiden mutation, heterozygous ([facility] 07/2017   • Gastritis    • ENDOSCOPY,DIAGNOSIS      duodenal biopsy neg for sprue   • VEIN LIGATION AND STRIPPING Bilateral 1970s    bilateral leg veins stripped      Family History   Problem Relation Age of Onset   • Other (sepsis) Father          age 76   • Colon Cancer Mo exam--gyn did.   Extremities: no edema  Neurologic: alert and oriented  Affect-anxiousness      ASSESSMENT AND PLAN:     Annual physical    Anxiety and Depression--I again recom take effexor ER 37.5 mg daily--discussed needs to take for 1-2 wks to see effec vaccines--tdap 4/2010. Shingrix disc at 10/10/18 visit.  flu shot 10/4/19.   Prevnar/pmvx declined at 11/27/19 visit.     Heart aware screening Calcium score 5 on 6/17/17.     Lab 8/26/17-cbc renal fcn panel-hgb 12.4.  5/4/18-UA large blood, 12 RBCs,  uri

## 2020-01-01 NOTE — TELEPHONE ENCOUNTER
I note refills for lisinopril 40 mg, omeprazole 20 mg, simvastatin 10 mg were sent to [facility]. Pt has appt today.
Patient is scheduled for an appt 5/4 for hematoma, not PE    fyi DR. Juan C Montaño
Pt scheduled a regular apt because she is having issues
Refill request is for a maintenance medication and has met the criteria specified in the Ambulatory Medication Refill Standing Order for eligibility, visits, laboratory, alerts and was sent to the requested pharmacy.   To [employer] - please call pt to sche
1

## 2020-01-10 ENCOUNTER — APPOINTMENT (OUTPATIENT)
Dept: LAB | Age: 65
End: 2020-01-10
Attending: INTERNAL MEDICINE
Payer: MEDICARE

## 2020-01-10 ENCOUNTER — OFFICE VISIT (OUTPATIENT)
Dept: INTERNAL MEDICINE CLINIC | Facility: CLINIC | Age: 65
End: 2020-01-10
Payer: MEDICARE

## 2020-01-10 VITALS
WEIGHT: 185 LBS | HEIGHT: 65 IN | DIASTOLIC BLOOD PRESSURE: 76 MMHG | SYSTOLIC BLOOD PRESSURE: 130 MMHG | TEMPERATURE: 98 F | BODY MASS INDEX: 30.82 KG/M2 | HEART RATE: 76 BPM

## 2020-01-10 DIAGNOSIS — R77.1 ELEVATED SERUM GLOBULIN LEVEL: ICD-10-CM
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
## 2020-01-10 DIAGNOSIS — S02.2XXD CLOSED FRACTURE OF NASAL BONE WITH ROUTINE HEALING, SUBSEQUENT ENCOUNTER: ICD-10-CM

## 2020-01-10 DIAGNOSIS — S62.91XD CLOSED FRACTURE OF RIGHT HAND WITH ROUTINE HEALING, SUBSEQUENT ENCOUNTER: ICD-10-CM

## 2020-01-10 DIAGNOSIS — I10 ESSENTIAL HYPERTENSION: ICD-10-CM

## 2020-01-10 LAB
BASOPHILS # BLD AUTO: 0.04 X10(3) UL (ref 0–0.2)
BASOPHILS NFR BLD AUTO: 0.4 %
BILIRUB UR QL: NEGATIVE
CLARITY UR: CLEAR
COLOR UR: YELLOW
DEPRECATED RDW RBC AUTO: 51.4 FL (ref 35.1–46.3)
EOSINOPHIL # BLD AUTO: 0.25 X10(3) UL (ref 0–0.7)
EOSINOPHIL NFR BLD AUTO: 2.5 %
ERYTHROCYTE [DISTWIDTH] IN BLOOD BY AUTOMATED COUNT: 15.7 % (ref 11–15)
GLUCOSE UR-MCNC: NEGATIVE MG/DL
HCT VFR BLD AUTO: 38.9 % (ref 35–48)
HGB BLD-MCNC: 11.8 G/DL (ref 12–16)
HGB UR QL STRIP.AUTO: NEGATIVE
HYALINE CASTS #/AREA URNS AUTO: 1 /LPF
IMM GRANULOCYTES # BLD AUTO: 0.02 X10(3) UL (ref 0–1)
IMM GRANULOCYTES NFR BLD: 0.2 %
KETONES UR-MCNC: NEGATIVE MG/DL
LYMPHOCYTES # BLD AUTO: 1.85 X10(3) UL (ref 1–4)
LYMPHOCYTES NFR BLD AUTO: 18.2 %
MCH RBC QN AUTO: 26.9 PG (ref 26–34)
MCHC RBC AUTO-ENTMCNC: 30.3 G/DL (ref 31–37)
MCV RBC AUTO: 88.8 FL (ref 80–100)
MONOCYTES # BLD AUTO: 0.8 X10(3) UL (ref 0.1–1)
MONOCYTES NFR BLD AUTO: 7.9 %
NEUTROPHILS # BLD AUTO: 7.21 X10 (3) UL (ref 1.5–7.7)
NEUTROPHILS # BLD AUTO: 7.21 X10(3) UL (ref 1.5–7.7)
NEUTROPHILS NFR BLD AUTO: 70.8 %
NITRITE UR QL STRIP.AUTO: NEGATIVE
PH UR: 5 [PH] (ref 5–8)
PLATELET # BLD AUTO: 372 10(3)UL (ref 150–450)
PROT UR-MCNC: NEGATIVE MG/DL
RBC # BLD AUTO: 4.38 X10(6)UL (ref 3.8–5.3)
RBC #/AREA URNS AUTO: 1 /HPF
SP GR UR STRIP: 1.02 (ref 1–1.03)
UROBILINOGEN UR STRIP-ACNC: <2
WBC # BLD AUTO: 10.2 X10(3) UL (ref 4–11)
WBC #/AREA URNS AUTO: 2 /HPF

## 2020-01-10 PROCEDURE — 86334 IMMUNOFIX E-PHORESIS SERUM: CPT

## 2020-01-10 PROCEDURE — 36415 COLL VENOUS BLD VENIPUNCTURE: CPT

## 2020-01-10 PROCEDURE — 99215 OFFICE O/P EST HI 40 MIN: CPT | Performed by: INTERNAL MEDICINE

## 2020-01-10 PROCEDURE — G0463 HOSPITAL OUTPT CLINIC VISIT: HCPCS | Performed by: INTERNAL MEDICINE

## 2020-01-10 PROCEDURE — 84165 PROTEIN E-PHORESIS SERUM: CPT

## 2020-01-10 PROCEDURE — 81001 URINALYSIS AUTO W/SCOPE: CPT | Performed by: INTERNAL MEDICINE

## 2020-01-10 PROCEDURE — 85025 COMPLETE CBC W/AUTO DIFF WBC: CPT | Performed by: INTERNAL MEDICINE

## 2020-01-10 RX ORDER — LISINOPRIL 40 MG/1
40 TABLET ORAL DAILY
Qty: 90 TABLET | Refills: 3 | Status: SHIPPED | OUTPATIENT
Start: 2020-01-10 | End: 2020-12-17

## 2020-01-10 RX ORDER — OMEPRAZOLE 20 MG/1
20 CAPSULE, DELAYED RELEASE ORAL
Qty: 90 CAPSULE | Refills: 3 | Status: SHIPPED | OUTPATIENT
Start: 2020-01-10 | End: 2020-12-17

## 2020-01-10 RX ORDER — VENLAFAXINE 37.5 MG/1
37.5 TABLET ORAL 2 TIMES DAILY
Qty: 90 TABLET | Refills: 3 | Status: SHIPPED | OUTPATIENT
Start: 2020-01-10 | End: 2020-09-03 | Stop reason: DRUGHIGH

## 2020-01-10 RX ORDER — VENLAFAXINE 37.5 MG/1
37.5 TABLET ORAL DAILY
Refills: 0 | COMMUNITY
Start: 2020-01-10 | End: 2020-01-10

## 2020-01-10 RX ORDER — ATENOLOL 50 MG/1
50 TABLET ORAL DAILY
Qty: 90 TABLET | Refills: 3 | Status: SHIPPED | OUTPATIENT
Start: 2020-01-10 | End: 2021-01-04

## 2020-01-10 RX ORDER — SIMVASTATIN 10 MG
TABLET ORAL
Qty: 45 TABLET | Refills: 3 | Status: SHIPPED | OUTPATIENT
Start: 2020-01-10 | End: 2020-12-17

## 2020-01-10 NOTE — PROGRESS NOTES
Felicia Steele is a 71year old female who presents for     ER follow up. Went to [facility] ER 2 days ago. Crys Galvez when feeding birds outside at her home--tripped on a fence and fell. Hit face on cement. Glasses got scraped. Nosebleed. No LOC.  Pain in R Diverticulosis 1997    colonoscopy 1997-divertic and int hemorrhoids   • DVT, bilateral lower limbs [facility]) age 22    DVT both LEs age 22 after had gb removed when on birth control pills.     • Esophagitis    • Factor 5 Leiden mutation, heterozygous ([facility] 07/ T&A     • UPPER GI ENDOSCOPY,DIAGNOSIS      duodenal biopsy neg for sprue   • VEIN LIGATION AND STRIPPING Bilateral 1970s    bilateral leg veins stripped      Family History   Problem Relation Age of Onset   • Other (sepsis) Father          age 76 at home. Note given to return to work 1/14/20.     2. Closed fracture of nasal bone with routine healing, subsequent encounter  I recommend patient see ENT. See Dr. Adelaide Maloney home she seen in the past–or she will see another ENT of her choice.     3. Closed f cancer -S/p bilateral mastectomies. Post [facility] seen Dr. Virginia Hooks at [place].      L hip DJD--Dr. Rosaura Sneed in [facility]--MRI left hip 10/17/18–left hip mild joint narrowing and chondral thinning     Healthcare maintenance:  Gyn -sees Dr Nickolas Oquendo mouth every morning before breakfast. 90 capsule 3   • simvastatin 10 MG Oral Tab Take one tablet every other day 45 tablet 3   • Venlafaxine HCl 37.5 MG Oral Tab Take 1 tablet (37.5 mg total) by mouth 2 (two) times daily.  90 tablet 3   • cholecalciferol (

## 2020-01-12 ENCOUNTER — TELEPHONE (OUTPATIENT)
Dept: INTERNAL MEDICINE CLINIC | Facility: CLINIC | Age: 65
End: 2020-01-12

## 2020-01-14 ENCOUNTER — TELEPHONE (OUTPATIENT)
Dept: INTERNAL MEDICINE CLINIC | Facility: CLINIC | Age: 65
End: 2020-01-14

## 2020-01-14 LAB
ALBUMIN SERPL ELPH-MCNC: 4.01 G/DL (ref 3.75–5.21)
ALBUMIN/GLOB SERPL: 1 {RATIO} (ref 1–2)
ALPHA1 GLOB SERPL ELPH-MCNC: 0.35 G/DL (ref 0.19–0.46)
ALPHA2 GLOB SERPL ELPH-MCNC: 1.07 G/DL (ref 0.48–1.05)
B-GLOBULIN SERPL ELPH-MCNC: 1.09 G/DL (ref 0.68–1.23)
GAMMA GLOB SERPL ELPH-MCNC: 1.48 G/DL (ref 0.62–1.7)
M PROTEIN MFR SERPL ELPH: 8 G/DL (ref 6.4–8.2)

## 2020-01-15 NOTE — TELEPHONE ENCOUNTER
Nursing, please tell patient lab done 1/10/20–results are okay. No sign of multiple myeloma. Thanks. Note to self–  1/10/20-cbc ua spe taryn--hgb 11.8 (stable), no monoclonal proteins.

## 2020-01-29 RX ORDER — SIMVASTATIN 10 MG
TABLET ORAL
Qty: 45 TABLET | Refills: 3 | OUTPATIENT
Start: 2020-01-29

## 2020-01-29 RX ORDER — LISINOPRIL 40 MG/1
TABLET ORAL
Qty: 90 TABLET | Refills: 3 | OUTPATIENT
Start: 2020-01-29

## 2020-01-29 RX ORDER — OMEPRAZOLE 20 MG/1
CAPSULE, DELAYED RELEASE ORAL
Qty: 90 CAPSULE | Refills: 3 | OUTPATIENT
Start: 2020-01-29

## 2020-01-30 NOTE — TELEPHONE ENCOUNTER
Refilled 1/10/20 to [facility]    Current refill request refused due to refill is either a duplicate request or has active refills at the pharmacy. Check previous templates.     Requested Prescriptions     Refused Prescriptions Disp Refills   • OMEPRA

## 2020-02-14 RX ORDER — SIMVASTATIN 10 MG
TABLET ORAL
Qty: 45 TABLET | Refills: 3 | OUTPATIENT
Start: 2020-02-14

## 2020-02-14 RX ORDER — LISINOPRIL 40 MG/1
TABLET ORAL
Qty: 90 TABLET | Refills: 3 | OUTPATIENT
Start: 2020-02-14

## 2020-02-14 RX ORDER — OMEPRAZOLE 20 MG/1
CAPSULE, DELAYED RELEASE ORAL
Qty: 90 CAPSULE | Refills: 3 | OUTPATIENT
Start: 2020-02-14

## 2020-02-14 NOTE — TELEPHONE ENCOUNTER
Pt has full year all 3 rx [facility] on 1/10    New request from [facility]    Spoke to patient who reports she uses [facility]     Current refill request refused due to refill is either a duplicate request or has active refills at the pharma

## 2020-09-03 RX ORDER — VENLAFAXINE HYDROCHLORIDE 37.5 MG/1
37.5 TABLET, EXTENDED RELEASE ORAL DAILY
Qty: 90 TABLET | Refills: 3 | Status: SHIPPED | OUTPATIENT
Start: 2020-09-03 | End: 2021-01-20

## 2020-09-03 RX ORDER — VENLAFAXINE 37.5 MG/1
TABLET ORAL
Qty: 90 TABLET | Refills: 7 | OUTPATIENT
Start: 2020-09-03

## 2020-09-03 NOTE — TELEPHONE ENCOUNTER
Pt called for NEW RX for:  Venlafaxine 37.5 sustained release one daily  Pt feels that it got changed at some point and it was no longer sustained release  Please call pt to discuss/advise  Pt uses [facility] for refill  Tasked to nursing

## 2020-09-03 NOTE — TELEPHONE ENCOUNTER
Appears pt has taken ER formulation has been prescribed in the past. Reading through the notes it appears that the pt was taking effexor ER 37.5 mg daily in 2018 then possibly stopped on her own. Restarted on 37.5mg BID fall 2019.      Now patient wanting t

## 2020-09-04 NOTE — TELEPHONE ENCOUNTER
Fax received from [employer]    req for medicare drug coverage determination form Re: Venlafaxine hcl er   in purple folder

## 2020-09-04 NOTE — TELEPHONE ENCOUNTER
To nursing -please tell pt rx for venlafaxine ER 37.5 mg daily #90 with 3 refills [facility]. (as she requested). Thanks.

## 2020-09-11 NOTE — TELEPHONE ENCOUNTER
Spoke to patient and relayed MD message, patient verbalized understanding. Patient reports she has not received the medication yet. To Bearl Hang to advise on additional info. Thanks!

## 2020-12-18 RX ORDER — LISINOPRIL 40 MG/1
TABLET ORAL
Qty: 90 TABLET | Refills: 0 | Status: SHIPPED | OUTPATIENT
Start: 2020-12-18 | End: 2021-01-20

## 2020-12-18 RX ORDER — OMEPRAZOLE 20 MG/1
CAPSULE, DELAYED RELEASE ORAL
Qty: 90 CAPSULE | Refills: 0 | Status: SHIPPED | OUTPATIENT
Start: 2020-12-18 | End: 2021-01-20

## 2020-12-18 RX ORDER — SIMVASTATIN 10 MG
TABLET ORAL
Qty: 45 TABLET | Refills: 0 | Status: SHIPPED | OUTPATIENT
Start: 2020-12-18 | End: 2021-01-20

## 2021-01-06 RX ORDER — ATENOLOL 50 MG/1
TABLET ORAL
Qty: 90 TABLET | Refills: 0 | Status: SHIPPED | OUTPATIENT
Start: 2021-01-06 | End: 2021-01-20

## 2021-01-20 ENCOUNTER — OFFICE VISIT (OUTPATIENT)
Dept: INTERNAL MEDICINE CLINIC | Facility: CLINIC | Age: 66
End: 2021-01-20
Payer: MEDICARE

## 2021-01-20 ENCOUNTER — LAB ENCOUNTER (OUTPATIENT)
Dept: LAB | Age: 66
End: 2021-01-20
Attending: INTERNAL MEDICINE
Payer: MEDICARE

## 2021-01-20 VITALS
HEART RATE: 60 BPM | BODY MASS INDEX: 32.99 KG/M2 | TEMPERATURE: 98 F | HEIGHT: 65 IN | DIASTOLIC BLOOD PRESSURE: 74 MMHG | WEIGHT: 198 LBS | SYSTOLIC BLOOD PRESSURE: 144 MMHG

## 2021-01-20 DIAGNOSIS — E78.2 HYPERLIPIDEMIA, MIXED: ICD-10-CM

## 2021-01-20 DIAGNOSIS — Z85.3 HISTORY OF BREAST CANCER: ICD-10-CM

## 2021-01-20 DIAGNOSIS — D68.51 FACTOR 5 LEIDEN MUTATION, HETEROZYGOUS (HCC): ICD-10-CM

## 2021-01-20 DIAGNOSIS — E55.9 VITAMIN D DEFICIENCY: ICD-10-CM

## 2021-01-20 DIAGNOSIS — K44.9 HIATAL HERNIA: ICD-10-CM

## 2021-01-20 DIAGNOSIS — Z00.00 MEDICARE ANNUAL WELLNESS VISIT, INITIAL: Primary | ICD-10-CM

## 2021-01-20 DIAGNOSIS — D64.9 MILD ANEMIA: ICD-10-CM

## 2021-01-20 DIAGNOSIS — Z86.718 HISTORY OF DVT OF LOWER EXTREMITY: ICD-10-CM

## 2021-01-20 DIAGNOSIS — I10 HYPERTENSION, ESSENTIAL: ICD-10-CM

## 2021-01-20 DIAGNOSIS — F41.9 ANXIETY AND DEPRESSION: ICD-10-CM

## 2021-01-20 DIAGNOSIS — I87.1 MAY-THURNER SYNDROME: ICD-10-CM

## 2021-01-20 DIAGNOSIS — F32.A ANXIETY AND DEPRESSION: ICD-10-CM

## 2021-01-20 LAB
BASOPHILS # BLD AUTO: 0.02 X10(3) UL (ref 0–0.2)
BASOPHILS NFR BLD AUTO: 0.3 %
DEPRECATED HBV CORE AB SER IA-ACNC: 13.7 NG/ML
DEPRECATED RDW RBC AUTO: 51.1 FL (ref 35.1–46.3)
EOSINOPHIL # BLD AUTO: 0.19 X10(3) UL (ref 0–0.7)
EOSINOPHIL NFR BLD AUTO: 2.9 %
ERYTHROCYTE [DISTWIDTH] IN BLOOD BY AUTOMATED COUNT: 16.1 % (ref 11–15)
HCT VFR BLD AUTO: 35.1 %
HGB BLD-MCNC: 10.4 G/DL
IMM GRANULOCYTES # BLD AUTO: 0.02 X10(3) UL (ref 0–1)
IMM GRANULOCYTES NFR BLD: 0.3 %
IRON SATURATION: 12 %
IRON SERPL-MCNC: 51 UG/DL
LYMPHOCYTES # BLD AUTO: 0.92 X10(3) UL (ref 1–4)
LYMPHOCYTES NFR BLD AUTO: 14.1 %
MCH RBC QN AUTO: 25.5 PG (ref 26–34)
MCHC RBC AUTO-ENTMCNC: 29.6 G/DL (ref 31–37)
MCV RBC AUTO: 86 FL
MONOCYTES # BLD AUTO: 0.49 X10(3) UL (ref 0.1–1)
MONOCYTES NFR BLD AUTO: 7.5 %
NEUTROPHILS # BLD AUTO: 4.88 X10 (3) UL (ref 1.5–7.7)
NEUTROPHILS # BLD AUTO: 4.88 X10(3) UL (ref 1.5–7.7)
NEUTROPHILS NFR BLD AUTO: 74.9 %
PLATELET # BLD AUTO: 340 10(3)UL (ref 150–450)
RBC # BLD AUTO: 4.08 X10(6)UL
TOTAL IRON BINDING CAPACITY: 435 UG/DL (ref 240–450)
TRANSFERRIN SERPL-MCNC: 292 MG/DL (ref 200–360)
WBC # BLD AUTO: 6.5 X10(3) UL (ref 4–11)

## 2021-01-20 PROCEDURE — 85025 COMPLETE CBC W/AUTO DIFF WBC: CPT

## 2021-01-20 PROCEDURE — 36415 COLL VENOUS BLD VENIPUNCTURE: CPT

## 2021-01-20 PROCEDURE — G0438 PPPS, INITIAL VISIT: HCPCS | Performed by: INTERNAL MEDICINE

## 2021-01-20 PROCEDURE — 99212 OFFICE O/P EST SF 10 MIN: CPT | Performed by: INTERNAL MEDICINE

## 2021-01-20 PROCEDURE — 84466 ASSAY OF TRANSFERRIN: CPT

## 2021-01-20 PROCEDURE — 82728 ASSAY OF FERRITIN: CPT

## 2021-01-20 PROCEDURE — 83540 ASSAY OF IRON: CPT

## 2021-01-20 RX ORDER — VENLAFAXINE HYDROCHLORIDE 37.5 MG/1
37.5 TABLET, EXTENDED RELEASE ORAL DAILY
Qty: 90 TABLET | Refills: 3 | Status: SHIPPED | OUTPATIENT
Start: 2021-01-20 | End: 2021-02-19

## 2021-01-20 RX ORDER — GLUCOSAMINE HCL 500 MG
1 TABLET ORAL DAILY
COMMUNITY
Start: 2021-01-20

## 2021-01-20 RX ORDER — VENLAFAXINE HYDROCHLORIDE 37.5 MG/1
37.5 TABLET, EXTENDED RELEASE ORAL DAILY
Qty: 90 TABLET | Refills: 3 | COMMUNITY
Start: 2021-01-20 | End: 2021-01-20

## 2021-01-20 RX ORDER — LISINOPRIL 40 MG/1
40 TABLET ORAL DAILY
Qty: 90 TABLET | Refills: 3 | Status: SHIPPED | OUTPATIENT
Start: 2021-01-20 | End: 2022-01-12

## 2021-01-20 RX ORDER — VENLAFAXINE 37.5 MG/1
37.5 TABLET ORAL 2 TIMES DAILY
Refills: 0 | COMMUNITY
Start: 2021-01-20 | End: 2021-01-20

## 2021-01-20 RX ORDER — ATENOLOL 50 MG/1
75 TABLET ORAL DAILY
Qty: 135 TABLET | Refills: 3 | Status: SHIPPED | OUTPATIENT
Start: 2021-01-20 | End: 2022-01-11

## 2021-01-20 RX ORDER — OMEPRAZOLE 20 MG/1
20 CAPSULE, DELAYED RELEASE ORAL
Qty: 90 CAPSULE | Refills: 3 | Status: SHIPPED | OUTPATIENT
Start: 2021-01-20 | End: 2022-01-11

## 2021-01-20 RX ORDER — SIMVASTATIN 10 MG
TABLET ORAL
Qty: 45 TABLET | Refills: 3 | Status: SHIPPED | OUTPATIENT
Start: 2021-01-20 | End: 2022-01-12

## 2021-01-20 NOTE — PROGRESS NOTES
Estelle Veliz is a 76year old female who presents for     Medicare annual    HTN--at last visit in January 2020, atenolol dose was increased atenolol 50 mg daily and continued lisinopril 40 mg daily.   Home BPs 140//80 in am's and down to 120/70 in after Jeannine.   • Hemorrhoids 2002    colonoscopy 2002   • Hiatal hernia    • History of gastroscopy 2004/2009/2012    EGD neg  2004; duod bx neg for sprue 2009   • History of vein stripping    • Hyperlipidemia     elevated cholesterol and triglycerides   • Hypert tobacco: Never Used    Alcohol use: Yes      Comment: socially    Drug use: Never         Allergies:    Adhesive Tape           RASH    Comment:Other reaction(s): rash  Bacitracin              RASH  Cephalexin Monohydr*    RASH    Comment:Other reaction(s) blood.   Discussed seeing hematologist--declines. EGD and colonoscopy-7/28/17 Dr Rona Mendoza -esophagitis, small HH, prior fundoplication with the wrap loose, 2 small adenomatous colon polyps and diverticulosis. Next colonoscopy at 4 yrs.   Declines f/u with GI 10/15/20.   Pmvx declined at 1/20/21 visit.     Heart aware screening Calcium score 5 on 6/17/17.     Lab 8/26/17-cbc renal fcn panel-hgb 12.4.  5/4/18-UA large blood, 12 RBCs,  ucx neg.   lab 5/31/18 ([facility]--[facility] sent to scan in) MD  1/20/2021         General Health     In the past six months, have you lost more than 10 pounds without trying?: 2 - No    Has your appetite been poor?: No    Type of Diet: Other    How does the patient maintain a good energy level?: Stretching will do--has form from her work    Do you have a living will?: No     Hearing Assessment (Required for AWV/SWV)      Hearing Screening    Screening Method: Questionnaire  I have a problem hearing over the telephone: No I have trouble following the conversa

## 2021-01-22 ENCOUNTER — TELEPHONE (OUTPATIENT)
Dept: INTERNAL MEDICINE CLINIC | Facility: CLINIC | Age: 66
End: 2021-01-22

## 2021-01-22 DIAGNOSIS — D50.9 IRON DEFICIENCY ANEMIA, UNSPECIFIED IRON DEFICIENCY ANEMIA TYPE: Primary | ICD-10-CM

## 2021-01-22 RX ORDER — FERROUS SULFATE 325(65) MG
325 TABLET ORAL
Refills: 0 | COMMUNITY
Start: 2021-01-22 | End: 2021-08-06

## 2021-01-22 NOTE — TELEPHONE ENCOUNTER
I called pt and discussed lab results (pt had already viewed results). Lab 1/20/21–CBC ferritin iron/TIBC–Hgb 10.4--was 11.2 on 10/31/20 at her gyn. Fe sat 12% (low) ferritin 13.7 (low). Pt sees no blood in stool or black stools.    Last EGD/colonosc

## 2021-02-16 ENCOUNTER — TELEPHONE (OUTPATIENT)
Dept: INTERNAL MEDICINE CLINIC | Facility: CLINIC | Age: 66
End: 2021-02-16

## 2021-02-16 NOTE — TELEPHONE ENCOUNTER
[facility] requests alternative for     Venlafaxine tablets - tablets are too expensive  Pt states capsules are much cheaper on formular    Fax placed in purple folder

## 2021-02-19 RX ORDER — VENLAFAXINE HYDROCHLORIDE 37.5 MG/1
37.5 CAPSULE, EXTENDED RELEASE ORAL DAILY
Qty: 90 CAPSULE | Refills: 3 | Status: SHIPPED | OUTPATIENT
Start: 2021-02-19 | End: 2022-02-04

## 2021-03-12 ENCOUNTER — LAB ENCOUNTER (OUTPATIENT)
Dept: LAB | Age: 66
End: 2021-03-12
Attending: INTERNAL MEDICINE
Payer: MEDICARE

## 2021-03-12 DIAGNOSIS — D50.9 IRON DEFICIENCY ANEMIA, UNSPECIFIED IRON DEFICIENCY ANEMIA TYPE: ICD-10-CM

## 2021-03-12 LAB
BASOPHILS # BLD AUTO: 0.03 X10(3) UL (ref 0–0.2)
BASOPHILS NFR BLD AUTO: 0.4 %
DEPRECATED RDW RBC AUTO: 59.3 FL (ref 35.1–46.3)
EOSINOPHIL # BLD AUTO: 0.31 X10(3) UL (ref 0–0.7)
EOSINOPHIL NFR BLD AUTO: 3.7 %
ERYTHROCYTE [DISTWIDTH] IN BLOOD BY AUTOMATED COUNT: 18.1 % (ref 11–15)
HCT VFR BLD AUTO: 38.1 %
HGB BLD-MCNC: 11.5 G/DL
IMM GRANULOCYTES # BLD AUTO: 0.02 X10(3) UL (ref 0–1)
IMM GRANULOCYTES NFR BLD: 0.2 %
LYMPHOCYTES # BLD AUTO: 2.03 X10(3) UL (ref 1–4)
LYMPHOCYTES NFR BLD AUTO: 24.3 %
MCH RBC QN AUTO: 26.8 PG (ref 26–34)
MCHC RBC AUTO-ENTMCNC: 30.2 G/DL (ref 31–37)
MCV RBC AUTO: 88.8 FL
MONOCYTES # BLD AUTO: 0.65 X10(3) UL (ref 0.1–1)
MONOCYTES NFR BLD AUTO: 7.8 %
NEUTROPHILS # BLD AUTO: 5.31 X10 (3) UL (ref 1.5–7.7)
NEUTROPHILS # BLD AUTO: 5.31 X10(3) UL (ref 1.5–7.7)
NEUTROPHILS NFR BLD AUTO: 63.6 %
PLATELET # BLD AUTO: 307 10(3)UL (ref 150–450)
RBC # BLD AUTO: 4.29 X10(6)UL
WBC # BLD AUTO: 8.4 X10(3) UL (ref 4–11)

## 2021-03-12 PROCEDURE — 85025 COMPLETE CBC W/AUTO DIFF WBC: CPT

## 2021-03-12 PROCEDURE — 36415 COLL VENOUS BLD VENIPUNCTURE: CPT

## 2021-03-13 ENCOUNTER — TELEPHONE (OUTPATIENT)
Dept: INTERNAL MEDICINE CLINIC | Facility: CLINIC | Age: 66
End: 2021-03-13

## 2021-03-13 NOTE — TELEPHONE ENCOUNTER
To nursing, pls call pt and tell her the anemia has improved. Hemoglobin has come up to 11.5, compared to 10.4 in January. Continue iron one daily.    She still needs to see the GI doctor, Dr Ashlee Delgado, b/c we don't have a reason that she became iron defici

## 2021-03-15 NOTE — TELEPHONE ENCOUNTER
Spoke to pt and advised on MD message below; pt verbalized understanding.     Pt reports she is planning on seeing GI MD

## 2021-03-17 RX ORDER — OMEPRAZOLE 20 MG/1
CAPSULE, DELAYED RELEASE ORAL
Qty: 90 CAPSULE | Refills: 3 | OUTPATIENT
Start: 2021-03-17

## 2021-03-17 RX ORDER — LISINOPRIL 40 MG/1
TABLET ORAL
Qty: 90 TABLET | Refills: 3 | OUTPATIENT
Start: 2021-03-17

## 2021-03-17 RX ORDER — SIMVASTATIN 10 MG
TABLET ORAL
Qty: 45 TABLET | Refills: 3 | OUTPATIENT
Start: 2021-03-17

## 2021-03-17 NOTE — TELEPHONE ENCOUNTER
Simvastatin, omeprazole, and lisinopril were just ref[facility] for a year supply [facility]. I did confirm with patient. [facility] not [facility]. [facility]ills [facility] denied.

## 2021-08-05 PROBLEM — R39.15 URGENCY OF URINATION: Status: ACTIVE | Noted: 2018-05-22

## 2021-08-05 PROBLEM — C50.919 MALIGNANT TUMOR OF BREAST (HCC): Status: ACTIVE | Noted: 2018-05-31

## 2021-08-05 PROBLEM — M25.511 ACUTE PAIN OF RIGHT SHOULDER: Status: ACTIVE | Noted: 2018-02-22

## 2021-08-05 PROBLEM — R20.9 SKIN SENSATION DISTURBANCE: Status: ACTIVE | Noted: 2021-08-05

## 2021-08-05 PROBLEM — R31.9 HEMATURIA, UNSPECIFIED: Status: ACTIVE | Noted: 2018-05-22

## 2021-08-05 PROBLEM — I10 BENIGN HYPERTENSION: Status: ACTIVE | Noted: 2021-08-05

## 2021-08-05 PROBLEM — N85.7 HEMATOMETRA: Status: ACTIVE | Noted: 2018-05-31

## 2021-08-05 PROBLEM — R42 DIZZINESS: Status: ACTIVE | Noted: 2021-08-05

## 2021-08-05 PROBLEM — Z80.0 FAMILY HISTORY OF MALIGNANT NEOPLASM OF GASTROINTESTINAL TRACT: Status: ACTIVE | Noted: 2021-08-05

## 2021-08-06 PROBLEM — Z86.0101 HISTORY OF ADENOMATOUS POLYP OF COLON: Status: ACTIVE | Noted: 2021-08-06

## 2021-08-06 PROBLEM — Z86.010 HISTORY OF ADENOMATOUS POLYP OF COLON: Status: ACTIVE | Noted: 2021-08-06

## 2021-08-06 PROBLEM — D50.9 IRON DEFICIENCY ANEMIA, UNSPECIFIED IRON DEFICIENCY ANEMIA TYPE: Status: ACTIVE | Noted: 2021-08-06

## 2022-01-11 RX ORDER — OMEPRAZOLE 20 MG/1
CAPSULE, DELAYED RELEASE ORAL
Qty: 90 CAPSULE | Refills: 0 | Status: SHIPPED | OUTPATIENT
Start: 2022-01-11 | End: 2022-02-04

## 2022-01-11 RX ORDER — ATENOLOL 50 MG/1
TABLET ORAL
Qty: 135 TABLET | Refills: 0 | Status: SHIPPED | OUTPATIENT
Start: 2022-01-11 | End: 2022-02-04

## 2022-01-12 RX ORDER — SIMVASTATIN 10 MG
10 TABLET ORAL EVERY OTHER DAY
Qty: 45 TABLET | Refills: 0 | Status: SHIPPED | OUTPATIENT
Start: 2022-01-12 | End: 2022-02-04

## 2022-01-12 RX ORDER — LISINOPRIL 40 MG/1
40 TABLET ORAL DAILY
Qty: 90 TABLET | Refills: 0 | Status: SHIPPED | OUTPATIENT
Start: 2022-01-12 | End: 2022-02-04

## 2022-01-13 ENCOUNTER — TELEPHONE (OUTPATIENT)
Dept: INTERNAL MEDICINE CLINIC | Facility: CLINIC | Age: 67
End: 2022-01-13

## 2022-01-13 DIAGNOSIS — Z00.00 ANNUAL PHYSICAL EXAM: Primary | ICD-10-CM

## 2022-01-13 NOTE — TELEPHONE ENCOUNTER
Scheduled medicare annual with Hung on Feb 4, requests orders for labs to be done prior to her appt    Call to confirm [phone]

## 2022-01-13 NOTE — TELEPHONE ENCOUNTER
To nursing home please tell patient lab orders are in the system. Thanks. Lab before 2/4/22 visit–CBC CMP TSH lipid.   1. Annual physical exam  - CBC WITH DIFFERENTIAL WITH PLATELET  - COMP METABOLIC PANEL (14)  - LIPID PANEL  - TSH W REFLEX TO FREE T

## 2022-01-29 ENCOUNTER — LAB ENCOUNTER (OUTPATIENT)
Dept: LAB | Age: 67
End: 2022-01-29
Attending: INTERNAL MEDICINE
Payer: MEDICARE

## 2022-01-29 LAB
ALBUMIN SERPL-MCNC: 3.3 G/DL (ref 3.4–5)
ALBUMIN/GLOB SERPL: 0.8 {RATIO} (ref 1–2)
ALP LIVER SERPL-CCNC: 103 U/L
ALT SERPL-CCNC: 14 U/L
ANION GAP SERPL CALC-SCNC: 3 MMOL/L (ref 0–18)
AST SERPL-CCNC: 13 U/L (ref 15–37)
BASOPHILS # BLD AUTO: 0.04 X10(3) UL (ref 0–0.2)
BASOPHILS NFR BLD AUTO: 0.5 %
BILIRUB SERPL-MCNC: 0.3 MG/DL (ref 0.1–2)
BUN BLD-MCNC: 16 MG/DL (ref 7–18)
BUN/CREAT SERPL: 19.5 (ref 10–20)
CALCIUM BLD-MCNC: 9.7 MG/DL (ref 8.5–10.1)
CHLORIDE SERPL-SCNC: 108 MMOL/L (ref 98–112)
CHOLEST SERPL-MCNC: 162 MG/DL (ref ?–200)
CO2 SERPL-SCNC: 28 MMOL/L (ref 21–32)
CREAT BLD-MCNC: 0.82 MG/DL
DEPRECATED RDW RBC AUTO: 52.4 FL (ref 35.1–46.3)
EOSINOPHIL # BLD AUTO: 0.3 X10(3) UL (ref 0–0.7)
EOSINOPHIL NFR BLD AUTO: 3.5 %
ERYTHROCYTE [DISTWIDTH] IN BLOOD BY AUTOMATED COUNT: 15.2 % (ref 11–15)
FASTING PATIENT LIPID ANSWER: YES
FASTING STATUS PATIENT QL REPORTED: YES
GLOBULIN PLAS-MCNC: 4.2 G/DL (ref 2.8–4.4)
GLUCOSE BLD-MCNC: 85 MG/DL (ref 70–99)
HCT VFR BLD AUTO: 37.3 %
HDLC SERPL-MCNC: 35 MG/DL (ref 40–59)
HGB BLD-MCNC: 11.3 G/DL
IMM GRANULOCYTES # BLD AUTO: 0.03 X10(3) UL (ref 0–1)
IMM GRANULOCYTES NFR BLD: 0.4 %
LDLC SERPL CALC-MCNC: 89 MG/DL (ref ?–100)
LYMPHOCYTES # BLD AUTO: 1.09 X10(3) UL (ref 1–4)
LYMPHOCYTES NFR BLD AUTO: 12.9 %
MCH RBC QN AUTO: 28.4 PG (ref 26–34)
MCHC RBC AUTO-ENTMCNC: 30.3 G/DL (ref 31–37)
MCV RBC AUTO: 93.7 FL
MONOCYTES # BLD AUTO: 0.53 X10(3) UL (ref 0.1–1)
MONOCYTES NFR BLD AUTO: 6.3 %
NEUTROPHILS # BLD AUTO: 6.49 X10 (3) UL (ref 1.5–7.7)
NEUTROPHILS # BLD AUTO: 6.49 X10(3) UL (ref 1.5–7.7)
NEUTROPHILS NFR BLD AUTO: 76.4 %
NONHDLC SERPL-MCNC: 127 MG/DL (ref ?–130)
OSMOLALITY SERPL CALC.SUM OF ELEC: 288 MOSM/KG (ref 275–295)
PLATELET # BLD AUTO: 283 10(3)UL (ref 150–450)
POTASSIUM SERPL-SCNC: 4.6 MMOL/L (ref 3.5–5.1)
PROT SERPL-MCNC: 7.5 G/DL (ref 6.4–8.2)
RBC # BLD AUTO: 3.98 X10(6)UL
SODIUM SERPL-SCNC: 139 MMOL/L (ref 136–145)
TRIGL SERPL-MCNC: 224 MG/DL (ref 30–149)
TSI SER-ACNC: 2.46 MIU/ML (ref 0.36–3.74)
VLDLC SERPL CALC-MCNC: 36 MG/DL (ref 0–30)
WBC # BLD AUTO: 8.5 X10(3) UL (ref 4–11)

## 2022-01-29 PROCEDURE — 80053 COMPREHEN METABOLIC PANEL: CPT | Performed by: INTERNAL MEDICINE

## 2022-01-29 PROCEDURE — 84443 ASSAY THYROID STIM HORMONE: CPT | Performed by: INTERNAL MEDICINE

## 2022-01-29 PROCEDURE — 36415 COLL VENOUS BLD VENIPUNCTURE: CPT | Performed by: INTERNAL MEDICINE

## 2022-01-29 PROCEDURE — 80061 LIPID PANEL: CPT | Performed by: INTERNAL MEDICINE

## 2022-01-29 PROCEDURE — 85025 COMPLETE CBC W/AUTO DIFF WBC: CPT | Performed by: INTERNAL MEDICINE

## 2022-02-04 ENCOUNTER — OFFICE VISIT (OUTPATIENT)
Dept: INTERNAL MEDICINE CLINIC | Facility: CLINIC | Age: 67
End: 2022-02-04
Payer: MEDICARE

## 2022-02-04 VITALS
HEIGHT: 65.5 IN | HEART RATE: 61 BPM | WEIGHT: 197.38 LBS | TEMPERATURE: 98 F | OXYGEN SATURATION: 98 % | DIASTOLIC BLOOD PRESSURE: 74 MMHG | BODY MASS INDEX: 32.49 KG/M2 | SYSTOLIC BLOOD PRESSURE: 124 MMHG

## 2022-02-04 DIAGNOSIS — M25.551 HIP PAIN, RIGHT: ICD-10-CM

## 2022-02-04 DIAGNOSIS — F41.9 ANXIETY AND DEPRESSION: ICD-10-CM

## 2022-02-04 DIAGNOSIS — D64.9 MILD CHRONIC ANEMIA: ICD-10-CM

## 2022-02-04 DIAGNOSIS — Z00.00 MEDICARE ANNUAL WELLNESS VISIT, SUBSEQUENT: Primary | ICD-10-CM

## 2022-02-04 DIAGNOSIS — I87.1 MAY-THURNER SYNDROME: ICD-10-CM

## 2022-02-04 DIAGNOSIS — D68.51 FACTOR 5 LEIDEN MUTATION, HETEROZYGOUS (HCC): ICD-10-CM

## 2022-02-04 DIAGNOSIS — E78.2 HYPERLIPIDEMIA, MIXED: ICD-10-CM

## 2022-02-04 DIAGNOSIS — R06.89 DECREASED BREATH SOUNDS AT RIGHT LUNG BASE: ICD-10-CM

## 2022-02-04 DIAGNOSIS — I10 HYPERTENSION, ESSENTIAL: ICD-10-CM

## 2022-02-04 DIAGNOSIS — F32.A ANXIETY AND DEPRESSION: ICD-10-CM

## 2022-02-04 PROCEDURE — 99214 OFFICE O/P EST MOD 30 MIN: CPT | Performed by: INTERNAL MEDICINE

## 2022-02-04 PROCEDURE — G0439 PPPS, SUBSEQ VISIT: HCPCS | Performed by: INTERNAL MEDICINE

## 2022-02-04 RX ORDER — LISINOPRIL 40 MG/1
40 TABLET ORAL DAILY
Qty: 90 TABLET | Refills: 3 | Status: SHIPPED | OUTPATIENT
Start: 2022-02-04

## 2022-02-04 RX ORDER — ATENOLOL 50 MG/1
75 TABLET ORAL DAILY
Qty: 135 TABLET | Refills: 3 | Status: SHIPPED | OUTPATIENT
Start: 2022-02-04

## 2022-02-04 RX ORDER — OMEPRAZOLE 20 MG/1
20 CAPSULE, DELAYED RELEASE ORAL
Qty: 90 CAPSULE | Refills: 3 | Status: SHIPPED | OUTPATIENT
Start: 2022-02-04

## 2022-02-04 RX ORDER — VENLAFAXINE HYDROCHLORIDE 37.5 MG/1
37.5 CAPSULE, EXTENDED RELEASE ORAL DAILY
Qty: 90 CAPSULE | Refills: 3 | Status: SHIPPED | OUTPATIENT
Start: 2022-02-04

## 2022-02-04 RX ORDER — SIMVASTATIN 10 MG
10 TABLET ORAL EVERY OTHER DAY
Qty: 45 TABLET | Refills: 3 | Status: SHIPPED | OUTPATIENT
Start: 2022-02-04

## 2022-02-04 RX ORDER — FERROUS SULFATE 325(65) MG
TABLET ORAL DAILY
COMMUNITY

## 2022-02-04 NOTE — PATIENT INSTRUCTIONS
Xray right hip. Chest xray. See your orthopedic doctor--Dr Declan Rodriguez. Urinalysis.    See your gynecologist.

## 2022-02-12 ENCOUNTER — HOSPITAL ENCOUNTER (OUTPATIENT)
Dept: GENERAL RADIOLOGY | Age: 67
Discharge: HOME | End: 2022-02-12
Attending: INTERNAL MEDICINE
Payer: MEDICARE

## 2022-02-12 ENCOUNTER — LAB ENCOUNTER (OUTPATIENT)
Dept: LAB | Age: 67
End: 2022-02-12
Attending: INTERNAL MEDICINE
Payer: MEDICARE

## 2022-02-12 DIAGNOSIS — R06.89 DECREASED BREATH SOUNDS AT RIGHT LUNG BASE: ICD-10-CM

## 2022-02-12 DIAGNOSIS — M25.551 HIP PAIN, RIGHT: ICD-10-CM

## 2022-02-12 LAB
BILIRUB UR QL: NEGATIVE
CLARITY UR: CLEAR
COLOR UR: YELLOW
GLUCOSE UR-MCNC: NEGATIVE MG/DL
KETONES UR-MCNC: NEGATIVE MG/DL
NITRITE UR QL STRIP.AUTO: NEGATIVE
PH UR: 6 [PH] (ref 5–8)
PROT UR-MCNC: NEGATIVE MG/DL
SP GR UR STRIP: 1.01 (ref 1–1.03)
UROBILINOGEN UR STRIP-ACNC: <2

## 2022-02-12 PROCEDURE — 87086 URINE CULTURE/COLONY COUNT: CPT | Performed by: INTERNAL MEDICINE

## 2022-02-12 PROCEDURE — 81001 URINALYSIS AUTO W/SCOPE: CPT | Performed by: INTERNAL MEDICINE

## 2022-02-12 PROCEDURE — 71046 X-RAY EXAM CHEST 2 VIEWS: CPT | Performed by: INTERNAL MEDICINE

## 2022-02-12 PROCEDURE — 73502 X-RAY EXAM HIP UNI 2-3 VIEWS: CPT | Performed by: INTERNAL MEDICINE

## 2022-02-12 PROCEDURE — 73503 X-RAY EXAM HIP UNI 4/> VIEWS: CPT | Performed by: INTERNAL MEDICINE

## 2022-02-14 ENCOUNTER — TELEPHONE (OUTPATIENT)
Dept: INTERNAL MEDICINE CLINIC | Facility: CLINIC | Age: 67
End: 2022-02-14

## 2022-03-21 ENCOUNTER — TELEPHONE (OUTPATIENT)
Dept: INTERNAL MEDICINE CLINIC | Facility: CLINIC | Age: 67
End: 2022-03-21

## 2022-03-21 NOTE — TELEPHONE ENCOUNTER
[address] [facility] is calling to request patient's most recent lab results from January and February be faxed to them. Fax # [phone]  Results were faxed and confirmation was received. FYI.

## 2022-08-02 ENCOUNTER — TELEPHONE (OUTPATIENT)
Dept: INTERNAL MEDICINE CLINIC | Facility: CLINIC | Age: 67
End: 2022-08-02

## 2022-08-02 NOTE — TELEPHONE ENCOUNTER
Pt has a painful lump in her left arm   Can pt be added to Dr Stevan Luna' schedule tomorrow   Please call to advise [phone]

## 2022-08-03 ENCOUNTER — TELEPHONE (OUTPATIENT)
Dept: INTERNAL MEDICINE CLINIC | Facility: CLINIC | Age: 67
End: 2022-08-03

## 2022-08-03 ENCOUNTER — LAB ENCOUNTER (OUTPATIENT)
Dept: LAB | Age: 67
End: 2022-08-03
Attending: INTERNAL MEDICINE
Payer: MEDICARE

## 2022-08-03 ENCOUNTER — HOSPITAL ENCOUNTER (OUTPATIENT)
Dept: GENERAL RADIOLOGY | Age: 67
Discharge: HOME | End: 2022-08-03
Attending: INTERNAL MEDICINE
Payer: MEDICARE

## 2022-08-03 ENCOUNTER — OFFICE VISIT (OUTPATIENT)
Dept: INTERNAL MEDICINE CLINIC | Facility: CLINIC | Age: 67
End: 2022-08-03
Payer: MEDICARE

## 2022-08-03 VITALS
HEART RATE: 64 BPM | TEMPERATURE: 98 F | DIASTOLIC BLOOD PRESSURE: 80 MMHG | SYSTOLIC BLOOD PRESSURE: 138 MMHG | BODY MASS INDEX: 32.1 KG/M2 | HEIGHT: 65.5 IN | WEIGHT: 195 LBS

## 2022-08-03 DIAGNOSIS — M79.632 PAIN OF LEFT FOREARM: ICD-10-CM

## 2022-08-03 DIAGNOSIS — M79.632 PAIN OF LEFT FOREARM: Primary | ICD-10-CM

## 2022-08-03 LAB
BASOPHILS # BLD AUTO: 0.04 X10(3) UL (ref 0–0.2)
BASOPHILS NFR BLD AUTO: 0.4 %
DEPRECATED RDW RBC AUTO: 51 FL (ref 35.1–46.3)
EOSINOPHIL # BLD AUTO: 0.4 X10(3) UL (ref 0–0.7)
EOSINOPHIL NFR BLD AUTO: 3.7 %
ERYTHROCYTE [DISTWIDTH] IN BLOOD BY AUTOMATED COUNT: 15.3 % (ref 11–15)
HCT VFR BLD AUTO: 34.1 %
HGB BLD-MCNC: 10.4 G/DL
IMM GRANULOCYTES # BLD AUTO: 0.02 X10(3) UL (ref 0–1)
IMM GRANULOCYTES NFR BLD: 0.2 %
LYMPHOCYTES # BLD AUTO: 1.52 X10(3) UL (ref 1–4)
LYMPHOCYTES NFR BLD AUTO: 14.2 %
MCH RBC QN AUTO: 27.7 PG (ref 26–34)
MCHC RBC AUTO-ENTMCNC: 30.5 G/DL (ref 31–37)
MCV RBC AUTO: 90.9 FL
MONOCYTES # BLD AUTO: 0.6 X10(3) UL (ref 0.1–1)
MONOCYTES NFR BLD AUTO: 5.6 %
NEUTROPHILS # BLD AUTO: 8.14 X10 (3) UL (ref 1.5–7.7)
NEUTROPHILS # BLD AUTO: 8.14 X10(3) UL (ref 1.5–7.7)
NEUTROPHILS NFR BLD AUTO: 75.9 %
PLATELET # BLD AUTO: 339 10(3)UL (ref 150–450)
RBC # BLD AUTO: 3.75 X10(6)UL
WBC # BLD AUTO: 10.7 X10(3) UL (ref 4–11)

## 2022-08-03 PROCEDURE — 1125F AMNT PAIN NOTED PAIN PRSNT: CPT | Performed by: INTERNAL MEDICINE

## 2022-08-03 PROCEDURE — 36415 COLL VENOUS BLD VENIPUNCTURE: CPT

## 2022-08-03 PROCEDURE — 99213 OFFICE O/P EST LOW 20 MIN: CPT | Performed by: INTERNAL MEDICINE

## 2022-08-03 PROCEDURE — 85025 COMPLETE CBC W/AUTO DIFF WBC: CPT

## 2022-08-03 PROCEDURE — 73090 X-RAY EXAM OF FOREARM: CPT | Performed by: INTERNAL MEDICINE

## 2022-08-03 RX ORDER — ASPIRIN 81 MG/1
81 TABLET ORAL DAILY
Refills: 0 | COMMUNITY
Start: 2022-08-03

## 2022-08-10 ENCOUNTER — TELEPHONE (OUTPATIENT)
Dept: INTERNAL MEDICINE CLINIC | Facility: CLINIC | Age: 67
End: 2022-08-10

## 2022-08-10 DIAGNOSIS — D64.9 MILD CHRONIC ANEMIA: Primary | ICD-10-CM

## 2022-08-12 ENCOUNTER — LAB ENCOUNTER (OUTPATIENT)
Dept: LAB | Age: 67
End: 2022-08-12
Attending: INTERNAL MEDICINE
Payer: MEDICARE

## 2022-08-12 DIAGNOSIS — D64.9 MILD CHRONIC ANEMIA: ICD-10-CM

## 2022-08-12 LAB
DEPRECATED HBV CORE AB SER IA-ACNC: 38.2 NG/ML
FOLATE SERPL-MCNC: >20 NG/ML (ref 8.7–?)
IRON SATN MFR SERPL: 11 %
IRON SERPL-MCNC: 36 UG/DL
TIBC SERPL-MCNC: 325 UG/DL (ref 240–450)
TRANSFERRIN SERPL-MCNC: 218 MG/DL (ref 200–360)
VIT B12 SERPL-MCNC: 321 PG/ML (ref 193–986)

## 2022-08-12 PROCEDURE — 82728 ASSAY OF FERRITIN: CPT

## 2022-08-12 PROCEDURE — 83540 ASSAY OF IRON: CPT

## 2022-08-12 PROCEDURE — 82746 ASSAY OF FOLIC ACID SERUM: CPT

## 2022-08-12 PROCEDURE — 36415 COLL VENOUS BLD VENIPUNCTURE: CPT

## 2022-08-12 PROCEDURE — 84466 ASSAY OF TRANSFERRIN: CPT

## 2022-08-12 PROCEDURE — 82607 VITAMIN B-12: CPT

## 2022-08-15 NOTE — TELEPHONE ENCOUNTER
Reviewed last CBC. Iron percent saturation 11%. For now I believe this can wait for Dr. Shirley Miss.

## 2022-09-14 ENCOUNTER — TELEPHONE (OUTPATIENT)
Dept: INTERNAL MEDICINE CLINIC | Facility: CLINIC | Age: 67
End: 2022-09-14

## 2022-09-26 ENCOUNTER — TELEPHONE (OUTPATIENT)
Dept: INTERNAL MEDICINE CLINIC | Facility: CLINIC | Age: 67
End: 2022-09-26

## 2022-09-26 NOTE — TELEPHONE ENCOUNTER
I called and spoke to pt. [place] Simon Hughes[place] [facility] [place] Pt notes she said she doesn't have RA. She has skin nodules that come and resolve. She has an appt to see Dr Linden Galindo 10/3/22 for colitis sx. Saw breast surgeon at [facility] this mo for f/u Dr Florentino Tena.   I recom she see Dr Rachana Boykin for subcut nodule consideration for bx. Pt expressed understanding and agreement.

## 2022-09-26 NOTE — TELEPHONE ENCOUNTER
Pt requests call back from Dr Cade Shin today  Re: colitis problems/arthritic pain   When pt was last seen she had a painful nodule which pt states she is getting more of  Pt has an appt with Dr Charly Rivers on Oct 3  Pt would like to discuss with Dr Cade Shin prior     Pt is at work today, sometimes calls do not go through to her   Pt is done working & definitely reachable after 3:30 pm - [phone]

## 2022-09-26 NOTE — TELEPHONE ENCOUNTER
To Dr Jesenia Joyce to advise---    Spoke with pt. Reports she has had colitis and arthritis flares for the past couple months, starting around the end of July. She does have appt with Dr. Reginald Dumont for 10/3. Pt states she has lost 15 pounds. She has been experiencing fatigue, abdominal cramping, and loose stool with mucus. She denies any blood in the stool. She feels that her symptoms are improving and stools are becoming more formed. Also reports new nodules appeared on her right arm and bottom of the right ankle. Also notes \"small nodules\" on her forehead. These nodules are painful. Pt saw Dr. Jesenia Joyce for a similar issue on her left arm about 1 month ago, reports that nodule has resolved. She saw a rheumatologist and was told she monreal not have RA and the doctor did not see a reason to do any more testing. Pt hoping to speak with Dr. Jesenia Joyce regarding above.

## 2022-10-13 ENCOUNTER — TELEPHONE (OUTPATIENT)
Dept: INTERNAL MEDICINE CLINIC | Facility: CLINIC | Age: 67
End: 2022-10-13

## 2022-10-13 NOTE — TELEPHONE ENCOUNTER
Call received from [facility], [address] [facility]. States. Dr. Young Mathis ordered MRI Enterography, + MRI abd, liver. Radiologist is asking if patient has had any previous MRI/CT abd done externally. They have checked care-everywhere and did not locate anything. I located CT in media section scanned 10/16/18. CT abd done at [facility] 5/31/18. Copy faxed to MRI at [phone].     Alt phone # [phone]

## 2022-10-14 ENCOUNTER — HOSPITAL ENCOUNTER (OUTPATIENT)
Dept: MRI IMAGING | Facility: HOSPITAL | Age: 67
Discharge: HOME | End: 2022-10-14
Attending: INTERNAL MEDICINE
Payer: MEDICARE

## 2022-10-14 DIAGNOSIS — R63.4 LOSS OF WEIGHT: ICD-10-CM

## 2022-10-14 DIAGNOSIS — R93.2 ABNORMAL LIVER CT: ICD-10-CM

## 2022-10-14 LAB
CREAT BLD-MCNC: 0.9 MG/DL
GFR SERPLBLD BASED ON 1.73 SQ M-ARVRAT: 70 ML/MIN/1.73M2 (ref 60–?)

## 2022-10-14 PROCEDURE — 72197 MRI PELVIS W/O & W/DYE: CPT | Performed by: INTERNAL MEDICINE

## 2022-10-14 PROCEDURE — A9575 INJ GADOTERATE MEGLUMI 0.1ML: HCPCS | Performed by: INTERNAL MEDICINE

## 2022-10-14 PROCEDURE — 74183 MRI ABD W/O CNTR FLWD CNTR: CPT | Performed by: INTERNAL MEDICINE

## 2022-10-14 PROCEDURE — 82565 ASSAY OF CREATININE: CPT

## 2022-10-17 ENCOUNTER — TELEPHONE (OUTPATIENT)
Dept: INTERNAL MEDICINE CLINIC | Facility: CLINIC | Age: 67
End: 2022-10-17

## 2022-11-02 NOTE — TELEPHONE ENCOUNTER
Daily Note     Today's date: 2022  Patient name: Rosa Rice  : 1950  MRN: [id number]  Referring provider: Bar Nelson 10/30  Manuals 10/20 10/26 11/2      10/5 10/7   CHINO GIII-IV 4' GIII-IIIV GIII-IIIV      CHINO Per Dr Chidi Blanco: patient instructed to stop lovenox. She should not be on any anticoagulation at this point while she is experiencing hematuria. She will have the CT urogram today to hopefully determine what is causing the bleeding.  We will hold off on any 

## 2022-11-07 ENCOUNTER — TELEPHONE (OUTPATIENT)
Dept: INTERNAL MEDICINE CLINIC | Facility: CLINIC | Age: 67
End: 2022-11-07

## 2022-11-07 NOTE — TELEPHONE ENCOUNTER
Called patient can see tomorrow at 11:30. Rape kit was sealed at this time. Chain of custody maintained     Rosie Brock RN  08/16/18 0550

## 2022-11-07 NOTE — TELEPHONE ENCOUNTER
Patient is calling she was just released from the hospital and was told to follow up this week with Dr Sanjuana Adair. Patient would like to schedule an appointment this week, there are no openings, can she be added? Patient will be returning to work next week.     Phone [phone]

## 2022-11-08 ENCOUNTER — OFFICE VISIT (OUTPATIENT)
Dept: INTERNAL MEDICINE CLINIC | Facility: CLINIC | Age: 67
End: 2022-11-08
Payer: MEDICARE

## 2022-11-08 ENCOUNTER — LAB ENCOUNTER (OUTPATIENT)
Dept: LAB | Age: 67
End: 2022-11-08
Attending: INTERNAL MEDICINE
Payer: MEDICARE

## 2022-11-08 VITALS
HEART RATE: 64 BPM | TEMPERATURE: 98 F | BODY MASS INDEX: 28.97 KG/M2 | DIASTOLIC BLOOD PRESSURE: 66 MMHG | HEIGHT: 65.5 IN | SYSTOLIC BLOOD PRESSURE: 126 MMHG | WEIGHT: 176 LBS

## 2022-11-08 DIAGNOSIS — D62 ACUTE BLOOD LOSS ANEMIA: ICD-10-CM

## 2022-11-08 DIAGNOSIS — K57.32 DIVERTICULITIS OF SIGMOID COLON: ICD-10-CM

## 2022-11-08 DIAGNOSIS — I82.421 ACUTE DEEP VEIN THROMBOSIS (DVT) OF ILIAC VEIN OF RIGHT LOWER EXTREMITY (HCC): ICD-10-CM

## 2022-11-08 DIAGNOSIS — I82.411 ACUTE DEEP VEIN THROMBOSIS (DVT) OF FEMORAL VEIN OF RIGHT LOWER EXTREMITY (HCC): Primary | ICD-10-CM

## 2022-11-08 LAB
ALBUMIN SERPL-MCNC: 2.9 G/DL (ref 3.4–5)
ALBUMIN/GLOB SERPL: 0.6 {RATIO} (ref 1–2)
ALP LIVER SERPL-CCNC: 83 U/L
ALT SERPL-CCNC: 23 U/L
ANION GAP SERPL CALC-SCNC: 8 MMOL/L (ref 0–18)
AST SERPL-CCNC: 31 U/L (ref 15–37)
BASOPHILS # BLD AUTO: 0.02 X10(3) UL (ref 0–0.2)
BASOPHILS NFR BLD AUTO: 0.2 %
BILIRUB SERPL-MCNC: 0.3 MG/DL (ref 0.1–2)
BUN BLD-MCNC: 11 MG/DL (ref 7–18)
BUN/CREAT SERPL: 14.5 (ref 10–20)
CALCIUM BLD-MCNC: 9.7 MG/DL (ref 8.5–10.1)
CHLORIDE SERPL-SCNC: 109 MMOL/L (ref 98–112)
CO2 SERPL-SCNC: 22 MMOL/L (ref 21–32)
CREAT BLD-MCNC: 0.76 MG/DL
DEPRECATED RDW RBC AUTO: 66.1 FL (ref 35.1–46.3)
EOSINOPHIL # BLD AUTO: 0.3 X10(3) UL (ref 0–0.7)
EOSINOPHIL NFR BLD AUTO: 3 %
ERYTHROCYTE [DISTWIDTH] IN BLOOD BY AUTOMATED COUNT: 20.1 % (ref 11–15)
FASTING STATUS PATIENT QL REPORTED: NO
GFR SERPLBLD BASED ON 1.73 SQ M-ARVRAT: 85 ML/MIN/1.73M2 (ref 60–?)
GLOBULIN PLAS-MCNC: 4.6 G/DL (ref 2.8–4.4)
GLUCOSE BLD-MCNC: 82 MG/DL (ref 70–99)
HCT VFR BLD AUTO: 27.2 %
HGB BLD-MCNC: 8.1 G/DL
IMM GRANULOCYTES # BLD AUTO: 0.05 X10(3) UL (ref 0–1)
IMM GRANULOCYTES NFR BLD: 0.5 %
LYMPHOCYTES # BLD AUTO: 1.48 X10(3) UL (ref 1–4)
LYMPHOCYTES NFR BLD AUTO: 14.6 %
MCH RBC QN AUTO: 27.7 PG (ref 26–34)
MCHC RBC AUTO-ENTMCNC: 29.8 G/DL (ref 31–37)
MCV RBC AUTO: 93.2 FL
MONOCYTES # BLD AUTO: 0.59 X10(3) UL (ref 0.1–1)
MONOCYTES NFR BLD AUTO: 5.8 %
NEUTROPHILS # BLD AUTO: 7.71 X10 (3) UL (ref 1.5–7.7)
NEUTROPHILS # BLD AUTO: 7.71 X10(3) UL (ref 1.5–7.7)
NEUTROPHILS NFR BLD AUTO: 75.9 %
OSMOLALITY SERPL CALC.SUM OF ELEC: 286 MOSM/KG (ref 275–295)
PLATELET # BLD AUTO: 325 10(3)UL (ref 150–450)
PLATELET MORPHOLOGY: NORMAL
POTASSIUM SERPL-SCNC: 3.8 MMOL/L (ref 3.5–5.1)
PROT SERPL-MCNC: 7.5 G/DL (ref 6.4–8.2)
RBC # BLD AUTO: 2.92 X10(6)UL
SODIUM SERPL-SCNC: 139 MMOL/L (ref 136–145)
WBC # BLD AUTO: 10.2 X10(3) UL (ref 4–11)

## 2022-11-08 PROCEDURE — 99215 OFFICE O/P EST HI 40 MIN: CPT | Performed by: INTERNAL MEDICINE

## 2022-11-08 PROCEDURE — 85025 COMPLETE CBC W/AUTO DIFF WBC: CPT

## 2022-11-08 PROCEDURE — 36415 COLL VENOUS BLD VENIPUNCTURE: CPT

## 2022-11-08 PROCEDURE — 80053 COMPREHEN METABOLIC PANEL: CPT

## 2022-11-08 PROCEDURE — 1126F AMNT PAIN NOTED NONE PRSNT: CPT | Performed by: INTERNAL MEDICINE

## 2022-11-08 PROCEDURE — 1111F DSCHRG MED/CURRENT MED MERGE: CPT | Performed by: INTERNAL MEDICINE

## 2022-11-08 RX ORDER — RIVAROXABAN 15 MG-20MG
KIT ORAL
Refills: 0 | COMMUNITY
Start: 2022-11-08

## 2022-11-09 ENCOUNTER — TELEPHONE (OUTPATIENT)
Dept: INTERNAL MEDICINE CLINIC | Facility: CLINIC | Age: 67
End: 2022-11-09

## 2022-11-09 DIAGNOSIS — D62 ACUTE BLOOD LOSS ANEMIA: Primary | ICD-10-CM

## 2022-11-10 NOTE — TELEPHONE ENCOUNTER
[facility]    Progress Note    Adan Links Patient Status:  Inpatient    10/17/1944 MRN [id number]   Location [facility] 2W/SW Attending [address] PCP Mayo Hurley MD, MD     Subjective:     Constit Nursing, please tell patient by phone- anemia is a little improved from her last level at [facility].  Hgb now 8.1-up from 7.6 on November 6. Repeat CBC in 1 week. Order entered. Thanks      11/8/2022-CBC CMP-Hgb 8. 1. Prior Hgb 7.6 on 11/6/2022 at [facility].  1. Acute blood loss anemia  - CBC WITH DIFFERENTIAL WITH PLATELET;  Future 04/27/2017   ALKPHO 36 04/27/2017   BILT 0.6 04/27/2017   TP 5.5* 04/27/2017   AST 25 04/27/2017   ALT 5* 04/27/2017   PTT 39.0* 04/24/2017   INR 1.7* 04/24/2017   TSH 2.81 04/15/2017   MG 2.4 04/25/2017   PHOS 5.0* 04/27/2017   TROP 0.72* 04/17/2017

## 2022-11-10 NOTE — TELEPHONE ENCOUNTER
Dr. Rene Salcedo, I spoke with patient and relayed your message. She verbalized understanding. She asks if you will send in a refill for her xanax. She says she talked to you about this at the recent office visit. Explained that I would relay this to the doctor. Explained that it may be done tomorrow morning. She says this is OK. I do not see that this has been prescribed since 2018. [place] in the [place] Please advise.

## 2022-11-11 RX ORDER — ALPRAZOLAM 0.25 MG/1
TABLET ORAL
Qty: 30 TABLET | Refills: 2 | Status: SHIPPED | OUTPATIENT
Start: 2022-11-11

## 2022-11-18 ENCOUNTER — LAB ENCOUNTER (OUTPATIENT)
Dept: LAB | Age: 67
End: 2022-11-18
Attending: INTERNAL MEDICINE
Payer: MEDICARE

## 2022-11-18 DIAGNOSIS — D62 ACUTE BLOOD LOSS ANEMIA: ICD-10-CM

## 2022-11-18 DIAGNOSIS — D64.9 MILD CHRONIC ANEMIA: ICD-10-CM

## 2022-11-18 LAB
BASOPHILS # BLD AUTO: 0.03 X10(3) UL (ref 0–0.2)
BASOPHILS NFR BLD AUTO: 0.3 %
DEPRECATED RDW RBC AUTO: 72 FL (ref 35.1–46.3)
EOSINOPHIL # BLD AUTO: 0.25 X10(3) UL (ref 0–0.7)
EOSINOPHIL NFR BLD AUTO: 2.6 %
ERYTHROCYTE [DISTWIDTH] IN BLOOD BY AUTOMATED COUNT: 20.6 % (ref 11–15)
HCT VFR BLD AUTO: 29.6 %
HGB BLD-MCNC: 8.7 G/DL
IMM GRANULOCYTES # BLD AUTO: 0.02 X10(3) UL (ref 0–1)
IMM GRANULOCYTES NFR BLD: 0.2 %
LYMPHOCYTES # BLD AUTO: 1.48 X10(3) UL (ref 1–4)
LYMPHOCYTES NFR BLD AUTO: 15.3 %
MCH RBC QN AUTO: 28.2 PG (ref 26–34)
MCHC RBC AUTO-ENTMCNC: 29.4 G/DL (ref 31–37)
MCV RBC AUTO: 95.8 FL
MONOCYTES # BLD AUTO: 0.7 X10(3) UL (ref 0.1–1)
MONOCYTES NFR BLD AUTO: 7.2 %
NEUTROPHILS # BLD AUTO: 7.22 X10 (3) UL (ref 1.5–7.7)
NEUTROPHILS # BLD AUTO: 7.22 X10(3) UL (ref 1.5–7.7)
NEUTROPHILS NFR BLD AUTO: 74.4 %
PLATELET # BLD AUTO: 406 10(3)UL (ref 150–450)
PLATELET MORPHOLOGY: NORMAL
RBC # BLD AUTO: 3.09 X10(6)UL
WBC # BLD AUTO: 9.7 X10(3) UL (ref 4–11)

## 2022-11-18 PROCEDURE — 85025 COMPLETE CBC W/AUTO DIFF WBC: CPT

## 2022-11-18 PROCEDURE — 36415 COLL VENOUS BLD VENIPUNCTURE: CPT

## 2022-11-21 ENCOUNTER — TELEPHONE (OUTPATIENT)
Dept: INTERNAL MEDICINE CLINIC | Facility: CLINIC | Age: 67
End: 2022-11-21

## 2022-11-21 DIAGNOSIS — D62 ACUTE BLOOD LOSS ANEMIA: Primary | ICD-10-CM

## 2022-11-21 NOTE — TELEPHONE ENCOUNTER
To nursing, please tell patient by phone  Anemia improved a little- Hgb 8.7, up from 8.1 on 11/8/2022. Continue iron pill and diet high in iron. Repeat CBC 2 weeks. Lab order entered. Thanks. Note self-  11/18/22-CBC-Hgb 8.7, improved from 8.1 on 11/8/22, previously 7.6 on 11/6/22. I note patient has seen results. 1. Acute blood loss anemia    - CBC WITH DIFFERENTIAL WITH PLATELET;  Future

## 2022-11-22 NOTE — TELEPHONE ENCOUNTER
Left detailed message with patient, relaying MD message below. (OK per [facility] Verbal Release / Privacy)    Asked patient to call back to guarantee receipt of message.

## 2022-12-01 ENCOUNTER — TELEPHONE (OUTPATIENT)
Dept: INTERNAL MEDICINE CLINIC | Facility: CLINIC | Age: 67
End: 2022-12-01

## 2022-12-01 NOTE — TELEPHONE ENCOUNTER
Kal Ornelas / Dr Ronel Pérez calling to request the lab results from 11/18/22 be faxed.   Fax # [phone]

## 2022-12-02 NOTE — TELEPHONE ENCOUNTER
Office called stating they never received the lab results that were requested. Please fax labs to [phone].

## 2022-12-09 ENCOUNTER — LAB ENCOUNTER (OUTPATIENT)
Dept: LAB | Age: 67
End: 2022-12-09
Attending: INTERNAL MEDICINE
Payer: MEDICARE

## 2022-12-09 DIAGNOSIS — D62 ACUTE BLOOD LOSS ANEMIA: ICD-10-CM

## 2022-12-09 LAB
BASOPHILS # BLD AUTO: 0.03 X10(3) UL (ref 0–0.2)
BASOPHILS NFR BLD AUTO: 0.2 %
DEPRECATED RDW RBC AUTO: 60.6 FL (ref 35.1–46.3)
EOSINOPHIL # BLD AUTO: 0.07 X10(3) UL (ref 0–0.7)
EOSINOPHIL NFR BLD AUTO: 0.5 %
ERYTHROCYTE [DISTWIDTH] IN BLOOD BY AUTOMATED COUNT: 17.2 % (ref 11–15)
HCT VFR BLD AUTO: 33.7 %
HGB BLD-MCNC: 9.7 G/DL
IMM GRANULOCYTES # BLD AUTO: 0.04 X10(3) UL (ref 0–1)
IMM GRANULOCYTES NFR BLD: 0.3 %
LYMPHOCYTES # BLD AUTO: 1.07 X10(3) UL (ref 1–4)
LYMPHOCYTES NFR BLD AUTO: 8.1 %
MCH RBC QN AUTO: 27.6 PG (ref 26–34)
MCHC RBC AUTO-ENTMCNC: 28.8 G/DL (ref 31–37)
MCV RBC AUTO: 96 FL
MONOCYTES # BLD AUTO: 0.84 X10(3) UL (ref 0.1–1)
MONOCYTES NFR BLD AUTO: 6.4 %
NEUTROPHILS # BLD AUTO: 11.09 X10 (3) UL (ref 1.5–7.7)
NEUTROPHILS # BLD AUTO: 11.09 X10(3) UL (ref 1.5–7.7)
NEUTROPHILS NFR BLD AUTO: 84.5 %
PLATELET # BLD AUTO: 398 10(3)UL (ref 150–450)
RBC # BLD AUTO: 3.51 X10(6)UL
WBC # BLD AUTO: 13.1 X10(3) UL (ref 4–11)

## 2022-12-09 PROCEDURE — 36415 COLL VENOUS BLD VENIPUNCTURE: CPT

## 2022-12-09 PROCEDURE — 85025 COMPLETE CBC W/AUTO DIFF WBC: CPT

## 2022-12-18 ENCOUNTER — TELEPHONE (OUTPATIENT)
Dept: INTERNAL MEDICINE CLINIC | Facility: CLINIC | Age: 67
End: 2022-12-18

## 2022-12-18 DIAGNOSIS — D64.9 MILD CHRONIC ANEMIA: Primary | ICD-10-CM

## 2022-12-19 NOTE — TELEPHONE ENCOUNTER
12/9/20222864-XZQ-Gqc 9.7, up from 8.7 in November 2022. WBC 13.1. Repeat CBC in 3 to 4 weeks. And patient has seen Ortho. I sent patient vozero message. 1. Mild chronic anemia  - CBC WITH DIFFERENTIAL WITH PLATELET;  Future

## 2023-01-01 ENCOUNTER — HOSPITAL ENCOUNTER (OUTPATIENT)
Age: 68
Discharge: HOME | End: 2023-01-01
Payer: MEDICARE

## 2023-01-01 VITALS
HEART RATE: 60 BPM | TEMPERATURE: 97 F | DIASTOLIC BLOOD PRESSURE: 63 MMHG | OXYGEN SATURATION: 96 % | WEIGHT: 170 LBS | BODY MASS INDEX: 29.02 KG/M2 | HEIGHT: 64 IN | RESPIRATION RATE: 18 BRPM | SYSTOLIC BLOOD PRESSURE: 148 MMHG

## 2023-01-01 DIAGNOSIS — R30.0 DYSURIA: Primary | ICD-10-CM

## 2023-01-01 DIAGNOSIS — N30.00 ACUTE CYSTITIS WITHOUT HEMATURIA: ICD-10-CM

## 2023-01-01 LAB
COLOR UR: YELLOW
GLUCOSE UR STRIP-MCNC: NEGATIVE MG/DL
NITRITE UR QL STRIP: NEGATIVE
PH UR STRIP: 5.5 [PH]
PROT UR STRIP-MCNC: NEGATIVE MG/DL
SP GR UR STRIP: 1.01
UROBILINOGEN UR STRIP-ACNC: <2 MG/DL

## 2023-01-01 PROCEDURE — 99203 OFFICE O/P NEW LOW 30 MIN: CPT | Performed by: NURSE PRACTITIONER

## 2023-01-01 PROCEDURE — 81002 URINALYSIS NONAUTO W/O SCOPE: CPT | Performed by: NURSE PRACTITIONER

## 2023-01-01 RX ORDER — NITROFURANTOIN 25; 75 MG/1; MG/1
100 CAPSULE ORAL 2 TIMES DAILY
Qty: 14 CAPSULE | Refills: 0 | Status: SHIPPED | OUTPATIENT
Start: 2023-01-01 | End: 2023-01-08

## 2023-01-01 NOTE — DISCHARGE INSTRUCTIONS
There is a small amount of bacteria in your urine. A urine culture was sent. Start the antibiotics. Drink plenty of water. Use over-the-counter topical cream.  Follow-up with your primary doctor.   Go to the emergency room if worsening symptoms - fever, vomiting, abdominal pain

## 2023-01-06 ENCOUNTER — LAB ENCOUNTER (OUTPATIENT)
Dept: LAB | Age: 68
End: 2023-01-06
Attending: INTERNAL MEDICINE
Payer: MEDICARE

## 2023-01-06 DIAGNOSIS — D64.9 MILD CHRONIC ANEMIA: ICD-10-CM

## 2023-01-06 LAB
BASOPHILS # BLD AUTO: 0.04 X10(3) UL (ref 0–0.2)
BASOPHILS NFR BLD AUTO: 0.4 %
DEPRECATED RDW RBC AUTO: 53.2 FL (ref 35.1–46.3)
EOSINOPHIL # BLD AUTO: 0.29 X10(3) UL (ref 0–0.7)
EOSINOPHIL NFR BLD AUTO: 2.8 %
ERYTHROCYTE [DISTWIDTH] IN BLOOD BY AUTOMATED COUNT: 15.7 % (ref 11–15)
HCT VFR BLD AUTO: 34.1 %
HGB BLD-MCNC: 10.4 G/DL
IMM GRANULOCYTES # BLD AUTO: 0.04 X10(3) UL (ref 0–1)
IMM GRANULOCYTES NFR BLD: 0.4 %
LYMPHOCYTES # BLD AUTO: 1.49 X10(3) UL (ref 1–4)
LYMPHOCYTES NFR BLD AUTO: 14.6 %
MCH RBC QN AUTO: 28 PG (ref 26–34)
MCHC RBC AUTO-ENTMCNC: 30.5 G/DL (ref 31–37)
MCV RBC AUTO: 91.7 FL
MONOCYTES # BLD AUTO: 0.67 X10(3) UL (ref 0.1–1)
MONOCYTES NFR BLD AUTO: 6.5 %
NEUTROPHILS # BLD AUTO: 7.7 X10 (3) UL (ref 1.5–7.7)
NEUTROPHILS # BLD AUTO: 7.7 X10(3) UL (ref 1.5–7.7)
NEUTROPHILS NFR BLD AUTO: 75.3 %
PLATELET # BLD AUTO: 313 10(3)UL (ref 150–450)
RBC # BLD AUTO: 3.72 X10(6)UL
WBC # BLD AUTO: 10.2 X10(3) UL (ref 4–11)

## 2023-01-06 PROCEDURE — 36415 COLL VENOUS BLD VENIPUNCTURE: CPT

## 2023-01-06 PROCEDURE — 85025 COMPLETE CBC W/AUTO DIFF WBC: CPT

## 2023-01-10 ENCOUNTER — TELEPHONE (OUTPATIENT)
Dept: INTERNAL MEDICINE CLINIC | Facility: CLINIC | Age: 68
End: 2023-01-10

## 2023-01-10 NOTE — TELEPHONE ENCOUNTER
1/6/20235395-FHT-Dzd 10.4, improved from 9.7 on 12/9/2022. Hgb back to baseline. WBC normal at 10.2. I note patient has seen results. I sent patient Admazely message. She is scheduled for colonoscopy with Dr. Akil Mckinney on 2/9/2023.

## 2023-01-30 RX ORDER — VENLAFAXINE HYDROCHLORIDE 37.5 MG/1
CAPSULE, EXTENDED RELEASE ORAL
Qty: 90 CAPSULE | Refills: 3 | Status: SHIPPED | OUTPATIENT
Start: 2023-01-30

## 2023-02-03 NOTE — PAT NURSING NOTE
Spoke with patient today regarding procedure scheduled 2/9/23. Patient stated that her boss tested positive for covid this week. The last time that she was around her boss was 2/1/23. Explained to patient that per [facility] Infection Control, patients who are exposed to covid must quarantine for 10 days before they can come in for an outpatient procedure. Explained to patient that she may need to reschedule her procedure after 2/11/23. Poornima Khanna, Dr. Dawn Cowan , to discuss rescheduling patient. Per Gurjit Barrientos, patient's indication for the procedure is weight loss and an abnormal CT scan. Will relay information to Dr. Nani Romano and determine if the procedure is emergent or if it can be rescheduled after 2/11/23. Explained to Gurjit Barrientos that if Dr. Nani Romano deems the case emergent and unable to be delayed, we will need to discuss the case with Pastor Dumont with accommodations reflecting the covid exposure.

## 2023-02-06 ENCOUNTER — TELEPHONE (OUTPATIENT)
Dept: INTERNAL MEDICINE CLINIC | Facility: CLINIC | Age: 68
End: 2023-02-06

## 2023-02-06 ENCOUNTER — VIRTUAL PHONE E/M (OUTPATIENT)
Dept: INTERNAL MEDICINE CLINIC | Facility: CLINIC | Age: 68
End: 2023-02-06

## 2023-02-06 DIAGNOSIS — U07.1 COVID-19 VIRUS INFECTION: Primary | ICD-10-CM

## 2023-02-06 NOTE — TELEPHONE ENCOUNTER
COVID triage:     Start of symptoms: + home test 2/5/23, symptoms 2/4/23     Fever:  []  No fever  []  Temperature  [x]  Chills   []  Night sweats     Cough: no  [] Productive cough  [] Cough with exertion  [] Dry cough     Breathing: no   [] Wheezing  [] Pain with deep breathing  [] SOB with exertion  [] SOB at rest  [] Heavy breathing     GI Symptoms: drinking ok  [] Diarrhea  [x] Nausea  [] Vomiting  [] Abdominal pain  [x] Lack of appetite     Other symptoms:  [] Sore throat  [] Difficulty swallowing  [x] Nasal drainage  [] Nasal congestion  [] PND  [x] Sinus pressure  [] Chest congestion  [] Ear pain  [x] Body aches  [] Loss of sense of smell   [] Loss of sense of taste  [x]Conjunctivitis - eyes watering  [x] Headache  [x] Fatigue  [] Weakness     [x]OTC Medications: Dayquil      [] Any recent travel? [x] Any sick contacts? Work   [] Are you a healthcare worker? Vaccinated: Yes  [x]   No []      Vaccinated and boosted. To Dr. Shala Ruiz. Patient has never had Covid before. She feels a little scared. She used [facility]. She had been scheduled for colonoscopy and GI gave her Paxlovid. She also left a message for cardiologist to call her back. She was told to call PCP about this as well. She has not yet started Paxlovid. Instructed patient to continue to monitor their symptoms and call with new or worsening symptoms. Explained that they can ask the answering service to page the doctor on call for them in the evening or over the weekend. Instructed patient to go to the nearest ER or call 911 should they develop chest pain, high fever, shortness of breath or difficulty breathing. Patient verbalized understanding. Explained that they should be quarantined and isolated at home on Days 1-5. Explained that they can resume activities on Days 6-10 should they be fever free, have improvement in their symptoms and wear a well fitted mask at all times. Patient verbalized understanding.

## 2023-02-06 NOTE — TELEPHONE ENCOUNTER
Please call patient she tested positive for COVID with home test yesterday prior to colonoscopy that has been rescheduled  Patient has headache, fever, nausea, nasal congestion  Should patient be take Paxlovid?  Was prescribed by GI physician  Patient has message into cardiologist also   Please call to discuss/advise  Tasked to nursing

## 2023-03-06 ENCOUNTER — HOSPITAL ENCOUNTER (OUTPATIENT)
Facility: HOSPITAL | Age: 68
Setting detail: HOSPITAL OUTPATIENT SURGERY
Discharge: HOME | End: 2023-03-06
Attending: INTERNAL MEDICINE | Admitting: INTERNAL MEDICINE
Payer: MEDICARE

## 2023-03-06 ENCOUNTER — ANESTHESIA (OUTPATIENT)
Dept: ENDOSCOPY | Facility: HOSPITAL | Age: 68
End: 2023-03-06
Payer: MEDICARE

## 2023-03-06 ENCOUNTER — ANESTHESIA EVENT (OUTPATIENT)
Dept: ENDOSCOPY | Facility: HOSPITAL | Age: 68
End: 2023-03-06
Payer: MEDICARE

## 2023-03-06 VITALS
DIASTOLIC BLOOD PRESSURE: 80 MMHG | BODY MASS INDEX: 27.49 KG/M2 | HEIGHT: 65 IN | SYSTOLIC BLOOD PRESSURE: 146 MMHG | RESPIRATION RATE: 18 BRPM | OXYGEN SATURATION: 96 % | WEIGHT: 165 LBS | HEART RATE: 89 BPM

## 2023-03-06 DIAGNOSIS — R63.4 WEIGHT LOSS: ICD-10-CM

## 2023-03-06 DIAGNOSIS — R10.30 LOWER ABDOMINAL PAIN: ICD-10-CM

## 2023-03-06 DIAGNOSIS — K57.92 DIVERTICULITIS: Primary | ICD-10-CM

## 2023-03-06 DIAGNOSIS — R93.3 ABNORMAL CT SCAN, COLON: ICD-10-CM

## 2023-03-06 PROCEDURE — 88305 TISSUE EXAM BY PATHOLOGIST: CPT | Performed by: INTERNAL MEDICINE

## 2023-03-06 PROCEDURE — 0DBN8ZX EXCISION OF SIGMOID COLON, VIA NATURAL OR ARTIFICIAL OPENING ENDOSCOPIC, DIAGNOSTIC: ICD-10-PCS | Performed by: INTERNAL MEDICINE

## 2023-03-06 RX ORDER — LIDOCAINE HYDROCHLORIDE 10 MG/ML
INJECTION, SOLUTION EPIDURAL; INFILTRATION; INTRACAUDAL; PERINEURAL AS NEEDED
Status: DISCONTINUED | OUTPATIENT
Start: 2023-03-06 | End: 2023-03-06 | Stop reason: SURG

## 2023-03-06 RX ORDER — SODIUM CHLORIDE, SODIUM LACTATE, POTASSIUM CHLORIDE, CALCIUM CHLORIDE 600; 310; 30; 20 MG/100ML; MG/100ML; MG/100ML; MG/100ML
INJECTION, SOLUTION INTRAVENOUS CONTINUOUS PRN
Status: DISCONTINUED | OUTPATIENT
Start: 2023-03-06 | End: 2023-03-06 | Stop reason: SURG

## 2023-03-06 RX ORDER — SODIUM CHLORIDE 0.9 % (FLUSH) 0.9 %
10 SYRINGE (ML) INJECTION AS NEEDED
Status: DISCONTINUED | OUTPATIENT
Start: 2023-03-06 | End: 2023-03-06

## 2023-03-06 RX ORDER — SODIUM CHLORIDE, SODIUM LACTATE, POTASSIUM CHLORIDE, CALCIUM CHLORIDE 600; 310; 30; 20 MG/100ML; MG/100ML; MG/100ML; MG/100ML
INJECTION, SOLUTION INTRAVENOUS CONTINUOUS
Status: DISCONTINUED | OUTPATIENT
Start: 2023-03-06 | End: 2023-03-06

## 2023-03-06 RX ORDER — PROCHLORPERAZINE EDISYLATE 5 MG/ML
5 INJECTION INTRAMUSCULAR; INTRAVENOUS ONCE AS NEEDED
OUTPATIENT
Start: 2023-03-06 | End: 2023-03-06

## 2023-03-06 RX ORDER — ONDANSETRON 2 MG/ML
4 INJECTION INTRAMUSCULAR; INTRAVENOUS ONCE AS NEEDED
OUTPATIENT
Start: 2023-03-06 | End: 2023-03-06

## 2023-03-06 RX ORDER — HALOPERIDOL 5 MG/ML
0.5 INJECTION INTRAMUSCULAR ONCE AS NEEDED
OUTPATIENT
Start: 2023-03-06 | End: 2023-03-06

## 2023-03-06 RX ORDER — NALOXONE HYDROCHLORIDE 0.4 MG/ML
80 INJECTION, SOLUTION INTRAMUSCULAR; INTRAVENOUS; SUBCUTANEOUS AS NEEDED
OUTPATIENT
Start: 2023-03-06 | End: 2023-03-06

## 2023-03-06 RX ORDER — MIDAZOLAM HYDROCHLORIDE 1 MG/ML
1 INJECTION INTRAMUSCULAR; INTRAVENOUS EVERY 5 MIN PRN
Status: DISCONTINUED | OUTPATIENT
Start: 2023-03-06 | End: 2023-03-06

## 2023-03-06 RX ORDER — SODIUM CHLORIDE, SODIUM LACTATE, POTASSIUM CHLORIDE, CALCIUM CHLORIDE 600; 310; 30; 20 MG/100ML; MG/100ML; MG/100ML; MG/100ML
INJECTION, SOLUTION INTRAVENOUS CONTINUOUS
OUTPATIENT
Start: 2023-03-06

## 2023-03-06 RX ADMIN — SODIUM CHLORIDE, SODIUM LACTATE, POTASSIUM CHLORIDE, CALCIUM CHLORIDE: 600; 310; 30; 20 INJECTION, SOLUTION INTRAVENOUS at 15:09:00

## 2023-03-06 RX ADMIN — SODIUM CHLORIDE, SODIUM LACTATE, POTASSIUM CHLORIDE, CALCIUM CHLORIDE: 600; 310; 30; 20 INJECTION, SOLUTION INTRAVENOUS at 15:10:00

## 2023-03-06 RX ADMIN — LIDOCAINE HYDROCHLORIDE 50 MG: 10 INJECTION, SOLUTION EPIDURAL; INFILTRATION; INTRACAUDAL; PERINEURAL at 15:11:00

## 2023-03-06 NOTE — ANESTHESIA POSTPROCEDURE EVALUATION
Patient: Brandy Calderon    Procedure Summary     Date: 03/06/23 Room / Location: [address] [place] [address] ENDOSCOPY 04 / [facility] ENDOSCOPY    Anesthesia Start: 1509 Anesthesia Stop:     Procedure: COLONOSCOPY Diagnosis:       Abnormal CT scan, colon      Weight loss      Lower abdominal pain      (diverticulitis)    Surgeons: Leonor Ortiz MD Anesthesiologist: Lucero Dunne CRNA    Anesthesia Type: MAC, general ASA Status: 3          Anesthesia Type: MAC, general    Vitals Value Taken Time   /60 03/06/23 1538   Temp  03/06/23 1539   Pulse 79 03/06/23 1538   Resp 18 03/06/23 1538   SpO2 94 % 03/06/23 1538       [facility] AN Post Evaluation:   Patient Evaluated in Patient location: endo 30. Patient Participation: waiting for patient participation  Level of Consciousness: sleepy but conscious  Pain Score: 0  Pain Management: adequate  Airway Patency:patent  Dental exam unchanged from preop  Yes    Cardiovascular Status: stable  Respiratory Status: room air  Postoperative Hydration stable      Leigh Kellogg CRNA  3/6/2023 3:39 PM

## 2023-03-06 NOTE — OPERATIVE REPORT
COLONOSCOPY REPORT    Patient Name:  Shikha Madden Record #: [id number]  YOB: 1955  Date of Procedure: 3/6/2023    Referring physician: Kady Gonzalez MD    Surgeon:  Indra Ward. Marialuisa Villalta MD    Pre-op diagnosis: Chronic lower abdominal pain, weight loss, abnormal CT scan and MRI of the sigmoid colon, family history of colon cancer at a young age    Post-op diagnosis: Diverticulosis with evidence of probable mild chronic diverticulitis and marked sigmoid narrowing    Medications given:  MAC    Procedure:    After informed consent, discussion of risks and alternatives the patient was placed in the left lateral decubitus position and the high definition pediatric colonoscope was introduced into the rectum and advanced under direct visualization to the sigmoid colon but could not cross this area. The scope was withdrawn and a gastroscope introduced which could be negotiated beyond a very tight sigmoid colon to the limits of the scope at 100 cm somewhere in the mid colon. Bowel preparation was fair. The mucosa was carefully inspected upon withdrawal of the scope. ASA class was 3. Findings: There was extensive diverticulosis in the sigmoid colon was markedly narrowed and edematous just telling passage of a gastroscope but not a pediatric colonoscope. In this region there was also a mild erythematous granular appearance which was biopsied. In the descending colon there was moderate scattered diverticulosis without narrowing. The overall appearance is most consistent with a chronic diverticulitis with stricture of the sigmoid colon. Retroflexed view of the anus revealed moderate sized internal hemorrhoids.  Digital rectal exam was normal.    Plan:  Surgical follow-up for consideration of sigmoid resection given the ongoing symptoms and imaging and endoscopic findings  Await biopsy results    Specimens: colon  EBL: minimal    Aronchick bowel prep scale:  5 Inadequate (repeat preparation needed)  4 Poor (semisolid stool could not be suctioned and <90% of mucosa seen)  3 Fair (semisolid stool could not be suctioned, but >90% of mucosa seen)  2 Good (clear liquid covering up to 25% of mucosa, but >90% of mucosa seen)  1 Excellent (>95% of mucosa seen)

## 2023-03-06 NOTE — DISCHARGE INSTRUCTIONS
ENDOSCOPY DISCHARGE INSTRUCTIONS    Procedure Performed:   Colonoscopy    Endoscopist: No name on file  FINDINGS:   Diverticulosis (pockets in colon that develop with age and lack of fiber intake)    MEDICATIONS:  You may resume all other medications today    DIET:  Resume Normal Diet    BIOPSIES:  Biopsy results will be released to you as soon as they are available as is now the law. This will not allow your physician the opportunity to go over these before they are released to you. It is not necessary to contact the office for explanation of these results. Your physician will contact you within a few business days of their release to review the findings with you    X-RAYS/LABS:   No X-rays/Labs were ordered today    ADDITIONAL RECOMMENDATIONS:    Schedule follow-up appointment with surgeon Dr. Delbert Akbar    Activity for remainder of today:    REST TODAY  DO NOT drive or operate heavy machinery  DO NOT drink any alcoholic beverages  DO NOT sign any legal documents or make any important decisions    After your procedure(s): It is not unusual to feel bloated or gassy . Passing gas and belching is encouraged. Lying on your left side with your knees flexed may relieve the discomfort. A hot pack to the abdomen may also help. After your gastroscopy (upper endoscopy): You may experience a slight sore throat which will subside. Throat lozenges or salt water gargle can be used.     FOLLOW-UP:  Contact the office at [phone] for follow-up appointment is needed or if you develop any of the following:    Severe abdominal pain/discomfort     Excessive bleeding                     Black tarry stool    Difficulty breathing/swallowing      Persistent nausea/vomiting  Fever above 100 degrees or chills

## 2023-03-31 RX ORDER — ATENOLOL 50 MG/1
TABLET ORAL
Qty: 135 TABLET | Refills: 0 | Status: SHIPPED | OUTPATIENT
Start: 2023-03-31

## 2023-03-31 RX ORDER — LISINOPRIL 40 MG/1
TABLET ORAL
Qty: 90 TABLET | Refills: 0 | Status: SHIPPED | OUTPATIENT
Start: 2023-03-31

## 2023-03-31 RX ORDER — SIMVASTATIN 10 MG
TABLET ORAL
Qty: 45 TABLET | Refills: 0 | Status: SHIPPED | OUTPATIENT
Start: 2023-03-31

## 2023-03-31 RX ORDER — OMEPRAZOLE 20 MG/1
CAPSULE, DELAYED RELEASE ORAL
Qty: 90 CAPSULE | Refills: 0 | Status: SHIPPED | OUTPATIENT
Start: 2023-03-31

## 2023-03-31 NOTE — TELEPHONE ENCOUNTER
Patient was called per request below. Patient agreed to scheduling an appointment with Dr Candis Kraft on 4/13/2023 for a Medicare Annual. Patient also shared with  that she is scheduled for surgery on 4/19/2023.

## 2023-04-12 NOTE — PATIENT INSTRUCTIONS
- You were seen in clinic for regular annual check-up. We have ordered labs for you and we will call you with the results. Please obtain the bloodwork fasting for 12 hours. OK to drink water the day of your blood draw. This blood test will include your physical exam test including cholesterol. You may also wait for Dr. Hetal Mckeon blood tests to get them all done at the same time.  -You are deemed low risk to proceed with surgery. We will obtain the requested blood tests and urine tests and will notify of the results. We recommend:    Recommendations:  -We will obtain requested blood work as well as urinalysis  - We will discuss with Dr. Wick Ip your cardiologist regarding management of Xarelto and timing. We will also ensure no other blood thinners are required  - Hold lisinopril day of surgery  - If able, please hold alprazolam the morning of surgery  -Avoid all NSAIDs including ibuprofen, Motrin, Advil, Aleve, etc. for at least 7 days  -Hold all supplements and vitamins at this time for at least 7 days prior to surgery    -Please continue checking your blood pressures at home and notify us if they are increasing   -Vaccines you may be due for: Prevnar 20, COVID dose #5, We recommended checking with your insurance for coverage of Shingrix, a 2-dose shingles vaccine that can be obtained at the pharmacy if there is adequate coverage through your insurance plan. - Please continue to eat a varied diet including recommended servings of vegetables, fruits, and low fat dairy. Minimize high saturated fats (such as fast foods) and high sugar intake (such as soda)  - We recommend 150 minutes of moderate intensity exercise (brisk walking, swimming) weekly to maintain your current weight. Targeted weight loss will require more vigorous exercise or more than 150 minutes/week.     Return to clinic in 1-2 weeks in the postoperative period      [address]   Tests on this list are recommended by your physician but may not be covered, or covered at this frequency, by your insurer. Please check with your insurance carrier before scheduling to verify coverage. PREVENTATIVE SERVICES FREQUENCY &  COVERAGE DETAILS LAST COMPLETION DATE   Diabetes Screening    Fasting Blood Sugar /  Glucose    One screening every 12 months if never tested or if previously tested but not diagnosed with pre-diabetes   One screening every 6 months if diagnosed with pre-diabetes Lab Results   Component Value Date    GLU 82 11/08/2022        Cardiovascular Disease Screening    Lipid Panel  Cholesterol  Lipoprotein (HDL)  Triglycerides Covered every 5 years for all Medicare beneficiaries without apparent signs or symptoms of cardiovascular disease Lab Results   Component Value Date    CHOLEST 162 01/29/2022    HDL 35 (L) 01/29/2022    LDL 89 01/29/2022    TRIG 224 (H) 01/29/2022         Electrocardiogram (EKG)   Covered if needed at Welcome to Medicare, and non-screening if indicated for medical reasons 04/13/2023      Ultrasound Screening for Abdominal Aortic Aneurysm (AAA) Covered once in a lifetime for one of the following risk factors    Men who are 73-68 years old and have ever smoked    Anyone with a family history -     Colorectal Cancer Screening  Covered for ages 52-80; only need ONE of the following:    Colonoscopy   Covered every 10 years    Covered every 2 years if patient is at high risk or previous colonoscopy was abnormal 03/06/2023    Colorectal Cancer Screening due on 03/06/2028    Flexible Sigmoidoscopy   Covered every 4 years -    Fecal Occult Blood Test Covered annually -   Bone Density Screening    Bone density screening    Covered every 2 years after age 72 if diagnosed with risk of osteoporosis or estrogen deficiency. Covered yearly for long-term glucocorticoid medication use (Steroids) No results found for this or any previous visit.       DEXA Scan Never done   Pap and Pelvic    Pap   Covered every 2 years for women at normal risk;  Annually if at high risk -  No recommendations at this time    Chlamydia Annually if high risk -  No recommendations at this time   Screening Mammogram    Mammogram     Recommend annually for all female patients aged 36 and older    One baseline mammogram covered for patients aged 32-38 -    No recommendations at this time    Immunizations    Influenza Covered once per flu season  Please get every year 10/15/2022  No recommendations at this time    Pneumococcal Each vaccine ([id number] & Qrokeqmhz37) covered once after 72 Prevnar 13: -    Tygrewsel28: -     Pneumococcal Vaccination(1 - PCV) Never done    Hepatitis B One screening covered for patients with certain risk factors   -  No recommendations at this time    Tetanus Toxoid Not covered by Medicare Part B unless medically necessary (cut with metal); may be covered with your pharmacy prescription benefits -    Tetanus, Diptheria and Pertusis TD and TDaP Not covered by Medicare Part B -  No recommendations at this time    Zoster Not covered by Medicare Part B; may be covered with your pharmacy  prescription benefits -  Zoster Vaccines(1 of 2) Never done       Annual Monitoring of Persistent Medications (ACE/ARB, digoxin diuretics, anticonvulsants)    Potassium Annually Lab Results   Component Value Date    K 3.8 11/08/2022         Creatinine   Annually Lab Results   Component Value Date    CREATSERUM 0.76 11/08/2022         BUN Annually Lab Results   Component Value Date    BUN 11 11/08/2022       Drug Serum Conc Annually No results found for: DIGOXIN, DIG, VALP

## 2023-04-13 ENCOUNTER — OFFICE VISIT (OUTPATIENT)
Dept: INTERNAL MEDICINE CLINIC | Facility: CLINIC | Age: 68
End: 2023-04-13

## 2023-04-13 VITALS
WEIGHT: 168 LBS | DIASTOLIC BLOOD PRESSURE: 70 MMHG | SYSTOLIC BLOOD PRESSURE: 138 MMHG | OXYGEN SATURATION: 96 % | HEART RATE: 73 BPM | BODY MASS INDEX: 27.99 KG/M2 | TEMPERATURE: 99 F | HEIGHT: 65 IN

## 2023-04-13 DIAGNOSIS — I10 HYPERTENSION, ESSENTIAL: ICD-10-CM

## 2023-04-13 DIAGNOSIS — F32.A ANXIETY AND DEPRESSION: ICD-10-CM

## 2023-04-13 DIAGNOSIS — D68.51 FACTOR 5 LEIDEN MUTATION, HETEROZYGOUS (HCC): ICD-10-CM

## 2023-04-13 DIAGNOSIS — Z13.220 SCREENING FOR LIPOID DISORDERS: ICD-10-CM

## 2023-04-13 DIAGNOSIS — E55.9 VITAMIN D DEFICIENCY: ICD-10-CM

## 2023-04-13 DIAGNOSIS — Z78.0 POSTMENOPAUSAL: ICD-10-CM

## 2023-04-13 DIAGNOSIS — Z13.29 SCREENING FOR THYROID DISORDER: ICD-10-CM

## 2023-04-13 DIAGNOSIS — K44.9 HIATAL HERNIA: ICD-10-CM

## 2023-04-13 DIAGNOSIS — D68.9 COAGULOPATHY (HCC): ICD-10-CM

## 2023-04-13 DIAGNOSIS — R31.29 MICROSCOPIC HEMATURIA: ICD-10-CM

## 2023-04-13 DIAGNOSIS — Z00.00 ENCOUNTER FOR ANNUAL HEALTH EXAMINATION: ICD-10-CM

## 2023-04-13 DIAGNOSIS — E78.2 HYPERLIPIDEMIA, MIXED: ICD-10-CM

## 2023-04-13 DIAGNOSIS — D64.9 MILD CHRONIC ANEMIA: ICD-10-CM

## 2023-04-13 DIAGNOSIS — Z00.00 ANNUAL PHYSICAL EXAM: Primary | ICD-10-CM

## 2023-04-13 DIAGNOSIS — Z13.0 SCREENING FOR DEFICIENCY ANEMIA: ICD-10-CM

## 2023-04-13 DIAGNOSIS — I87.1 MAY-THURNER SYNDROME: ICD-10-CM

## 2023-04-13 DIAGNOSIS — K57.32 DIVERTICULITIS OF SIGMOID COLON: ICD-10-CM

## 2023-04-13 DIAGNOSIS — F41.9 ANXIETY AND DEPRESSION: ICD-10-CM

## 2023-04-13 DIAGNOSIS — I82.421 ACUTE DEEP VEIN THROMBOSIS (DVT) OF ILIAC VEIN OF RIGHT LOWER EXTREMITY (HCC): ICD-10-CM

## 2023-04-13 DIAGNOSIS — I82.412 DEEP VEIN THROMBOSIS (DVT) OF FEMORAL VEIN OF LEFT LOWER EXTREMITY, UNSPECIFIED CHRONICITY (HCC): ICD-10-CM

## 2023-04-13 DIAGNOSIS — Z01.818 PREOP EXAMINATION: ICD-10-CM

## 2023-04-13 DIAGNOSIS — Z13.1 SCREENING FOR DIABETES MELLITUS: ICD-10-CM

## 2023-04-13 RX ORDER — CHLORHEXIDINE GLUCONATE 0.12 MG/ML
RINSE ORAL
COMMUNITY
Start: 2023-04-08

## 2023-04-13 RX ORDER — METRONIDAZOLE 250 MG/1
TABLET ORAL
COMMUNITY
Start: 2023-04-04

## 2023-04-13 RX ORDER — NEOMYCIN SULFATE 500 MG/1
2 TABLET ORAL SEE ADMIN INSTRUCTIONS
COMMUNITY
Start: 2023-04-04

## 2023-04-13 RX ORDER — MOLNUPIRAVIR 200 MG/1
CAPSULE ORAL
COMMUNITY
End: 2023-04-17

## 2023-04-13 RX ORDER — METOCLOPRAMIDE 10 MG/1
1 TABLET ORAL AS DIRECTED
COMMUNITY
Start: 2023-04-04

## 2023-04-14 ENCOUNTER — TELEPHONE (OUTPATIENT)
Dept: INTERNAL MEDICINE CLINIC | Facility: CLINIC | Age: 68
End: 2023-04-14

## 2023-04-14 ENCOUNTER — LAB ENCOUNTER (OUTPATIENT)
Dept: LAB | Age: 68
End: 2023-04-14
Attending: INTERNAL MEDICINE
Payer: MEDICARE

## 2023-04-14 DIAGNOSIS — E78.2 HYPERLIPIDEMIA, MIXED: ICD-10-CM

## 2023-04-14 DIAGNOSIS — Z01.818 PREOP EXAMINATION: Primary | ICD-10-CM

## 2023-04-14 DIAGNOSIS — R31.29 MICROSCOPIC HEMATURIA: ICD-10-CM

## 2023-04-14 DIAGNOSIS — D64.9 MILD CHRONIC ANEMIA: ICD-10-CM

## 2023-04-14 DIAGNOSIS — E03.9 ACQUIRED HYPOTHYROIDISM: ICD-10-CM

## 2023-04-14 DIAGNOSIS — E55.9 VITAMIN D DEFICIENCY: ICD-10-CM

## 2023-04-14 DIAGNOSIS — D68.9 COAGULOPATHY (HCC): ICD-10-CM

## 2023-04-14 DIAGNOSIS — Z01.818 PREOP EXAMINATION: ICD-10-CM

## 2023-04-14 DIAGNOSIS — Z01.818 PRE-OP TESTING: ICD-10-CM

## 2023-04-14 LAB
ANTIBODY SCREEN: NEGATIVE
APTT PPP: 34.4 SECONDS (ref 23.3–35.6)
ATRIAL RATE: 72 BPM
INR BLD: 1.32 (ref 0.85–1.16)
P AXIS: 29 DEGREES
P-R INTERVAL: 164 MS
PROTHROMBIN TIME: 16.3 SECONDS (ref 11.6–14.8)
Q-T INTERVAL: 366 MS
QRS DURATION: 88 MS
QTC CALCULATION (BEZET): 400 MS
R AXIS: -3 DEGREES
RH BLOOD TYPE: POSITIVE
RH BLOOD TYPE: POSITIVE
T AXIS: 43 DEGREES
VENTRICULAR RATE: 72 BPM

## 2023-04-14 PROCEDURE — 87641 MR-STAPH DNA AMP PROBE: CPT

## 2023-04-14 PROCEDURE — 85610 PROTHROMBIN TIME: CPT

## 2023-04-14 PROCEDURE — 86850 RBC ANTIBODY SCREEN: CPT

## 2023-04-14 PROCEDURE — 86900 BLOOD TYPING SEROLOGIC ABO: CPT

## 2023-04-14 PROCEDURE — 86901 BLOOD TYPING SEROLOGIC RH(D): CPT

## 2023-04-14 PROCEDURE — 85730 THROMBOPLASTIN TIME PARTIAL: CPT

## 2023-04-14 PROCEDURE — 36415 COLL VENOUS BLD VENIPUNCTURE: CPT

## 2023-04-14 RX ORDER — ENOXAPARIN SODIUM 100 MG/ML
1 INJECTION SUBCUTANEOUS 2 TIMES DAILY
Qty: 7.6 ML | Refills: 0 | Status: SHIPPED | OUTPATIENT
Start: 2023-04-14 | End: 2023-04-19

## 2023-04-14 NOTE — TELEPHONE ENCOUNTER
Spoke with Dr. Sarah Allison - [facility] bridge or filter    He does recommend some Lovenox bridging on the 3 days while holding Xarelto. We will then use:    Hold Xarelto Sunday 4/16. In its place we will propose the following schedule:  4/16: Lovenox 80 mg (1 mg/kg) once in the morning and once in the evening    4/17: lovenox 80 mg BID    4/18: Hold all anticoagulation    4/19: Hold lovenox day of surgery    Sent to pharmacy. Spoke with Dr. Duran Vasquez his preference is to hold anticoagulation 4/18 and 4/19 given patient's advanced age. Need to balance the history of DVT/PE and bleeding risk with surgery. I did call back Valerie Lay with these updated recommendations.     She will complete labs fasting tomorrow - reordered

## 2023-04-15 ENCOUNTER — LAB ENCOUNTER (OUTPATIENT)
Dept: LAB | Age: 68
End: 2023-04-15
Attending: INTERNAL MEDICINE
Payer: MEDICARE

## 2023-04-15 DIAGNOSIS — E78.2 HYPERLIPIDEMIA, MIXED: ICD-10-CM

## 2023-04-15 DIAGNOSIS — E03.9 ACQUIRED HYPOTHYROIDISM: ICD-10-CM

## 2023-04-15 DIAGNOSIS — Z01.818 PREOP EXAMINATION: ICD-10-CM

## 2023-04-15 DIAGNOSIS — D64.9 MILD CHRONIC ANEMIA: ICD-10-CM

## 2023-04-15 DIAGNOSIS — E55.9 VITAMIN D DEFICIENCY: ICD-10-CM

## 2023-04-15 DIAGNOSIS — R31.29 MICROSCOPIC HEMATURIA: ICD-10-CM

## 2023-04-15 LAB
ALBUMIN SERPL-MCNC: 3 G/DL (ref 3.4–5)
ALBUMIN/GLOB SERPL: 0.6 {RATIO} (ref 1–2)
ALP LIVER SERPL-CCNC: 95 U/L
ALT SERPL-CCNC: 13 U/L
ANION GAP SERPL CALC-SCNC: 7 MMOL/L (ref 0–18)
AST SERPL-CCNC: 16 U/L (ref 15–37)
BASOPHILS # BLD AUTO: 0.03 X10(3) UL (ref 0–0.2)
BASOPHILS NFR BLD AUTO: 0.5 %
BILIRUB SERPL-MCNC: 0.3 MG/DL (ref 0.1–2)
BILIRUB UR QL: NEGATIVE
BUN BLD-MCNC: 12 MG/DL (ref 7–18)
BUN/CREAT SERPL: 13.2 (ref 10–20)
CALCIUM BLD-MCNC: 10 MG/DL (ref 8.5–10.1)
CHLORIDE SERPL-SCNC: 107 MMOL/L (ref 98–112)
CHOLEST SERPL-MCNC: 166 MG/DL (ref ?–200)
CLARITY UR: CLEAR
CO2 SERPL-SCNC: 27 MMOL/L (ref 21–32)
COLOR UR: YELLOW
CREAT BLD-MCNC: 0.91 MG/DL
DEPRECATED HBV CORE AB SER IA-ACNC: 12.4 NG/ML
DEPRECATED RDW RBC AUTO: 55 FL (ref 35.1–46.3)
EOSINOPHIL # BLD AUTO: 0.22 X10(3) UL (ref 0–0.7)
EOSINOPHIL NFR BLD AUTO: 3.7 %
ERYTHROCYTE [DISTWIDTH] IN BLOOD BY AUTOMATED COUNT: 17.3 % (ref 11–15)
FASTING PATIENT LIPID ANSWER: YES
FASTING STATUS PATIENT QL REPORTED: YES
GFR SERPLBLD BASED ON 1.73 SQ M-ARVRAT: 69 ML/MIN/1.73M2 (ref 60–?)
GLOBULIN PLAS-MCNC: 4.9 G/DL (ref 2.8–4.4)
GLUCOSE BLD-MCNC: 92 MG/DL (ref 70–99)
GLUCOSE UR-MCNC: NEGATIVE MG/DL
HCT VFR BLD AUTO: 34.2 %
HDLC SERPL-MCNC: 42 MG/DL (ref 40–59)
HGB BLD-MCNC: 10.3 G/DL
HGB UR QL STRIP.AUTO: NEGATIVE
IMM GRANULOCYTES # BLD AUTO: 0.02 X10(3) UL (ref 0–1)
IMM GRANULOCYTES NFR BLD: 0.3 %
IRON SATN MFR SERPL: 11 %
IRON SERPL-MCNC: 46 UG/DL
KETONES UR-MCNC: NEGATIVE MG/DL
LDLC SERPL CALC-MCNC: 91 MG/DL (ref ?–100)
LYMPHOCYTES # BLD AUTO: 1 X10(3) UL (ref 1–4)
LYMPHOCYTES NFR BLD AUTO: 17 %
MCH RBC QN AUTO: 26 PG (ref 26–34)
MCHC RBC AUTO-ENTMCNC: 30.1 G/DL (ref 31–37)
MCV RBC AUTO: 86.4 FL
MONOCYTES # BLD AUTO: 0.4 X10(3) UL (ref 0.1–1)
MONOCYTES NFR BLD AUTO: 6.8 %
MRSA DNA SPEC QL NAA+PROBE: NEGATIVE
NEUTROPHILS # BLD AUTO: 4.2 X10 (3) UL (ref 1.5–7.7)
NEUTROPHILS # BLD AUTO: 4.2 X10(3) UL (ref 1.5–7.7)
NEUTROPHILS NFR BLD AUTO: 71.7 %
NITRITE UR QL STRIP.AUTO: NEGATIVE
NONHDLC SERPL-MCNC: 124 MG/DL (ref ?–130)
OSMOLALITY SERPL CALC.SUM OF ELEC: 291 MOSM/KG (ref 275–295)
PH UR: 5 [PH] (ref 5–8)
PLATELET # BLD AUTO: 317 10(3)UL (ref 150–450)
POTASSIUM SERPL-SCNC: 4.1 MMOL/L (ref 3.5–5.1)
PROT SERPL-MCNC: 7.9 G/DL (ref 6.4–8.2)
PROT UR-MCNC: NEGATIVE MG/DL
RBC # BLD AUTO: 3.96 X10(6)UL
SODIUM SERPL-SCNC: 141 MMOL/L (ref 136–145)
SP GR UR STRIP: 1.01 (ref 1–1.03)
TIBC SERPL-MCNC: 431 UG/DL (ref 240–450)
TRANSFERRIN SERPL-MCNC: 289 MG/DL (ref 200–360)
TRIGL SERPL-MCNC: 190 MG/DL (ref 30–149)
TSI SER-ACNC: 3.03 MIU/ML (ref 0.36–3.74)
UROBILINOGEN UR STRIP-ACNC: <2
VIT C UR-MCNC: NEGATIVE MG/DL
VIT D+METAB SERPL-MCNC: 51.4 NG/ML (ref 30–100)
VLDLC SERPL CALC-MCNC: 31 MG/DL (ref 0–30)
WBC # BLD AUTO: 5.9 X10(3) UL (ref 4–11)

## 2023-04-15 PROCEDURE — 85025 COMPLETE CBC W/AUTO DIFF WBC: CPT

## 2023-04-15 PROCEDURE — 82728 ASSAY OF FERRITIN: CPT

## 2023-04-15 PROCEDURE — 84443 ASSAY THYROID STIM HORMONE: CPT

## 2023-04-15 PROCEDURE — 36415 COLL VENOUS BLD VENIPUNCTURE: CPT

## 2023-04-15 PROCEDURE — 87086 URINE CULTURE/COLONY COUNT: CPT

## 2023-04-15 PROCEDURE — 81001 URINALYSIS AUTO W/SCOPE: CPT

## 2023-04-15 PROCEDURE — 83540 ASSAY OF IRON: CPT

## 2023-04-15 PROCEDURE — 82306 VITAMIN D 25 HYDROXY: CPT

## 2023-04-15 PROCEDURE — 80061 LIPID PANEL: CPT

## 2023-04-15 PROCEDURE — 84466 ASSAY OF TRANSFERRIN: CPT

## 2023-04-15 PROCEDURE — 80053 COMPREHEN METABOLIC PANEL: CPT

## 2023-04-16 NOTE — TELEPHONE ENCOUNTER
Called patient with physical exam labs and preop labs:    -Mild iron deficiency anemia that we will address after surgery  - Cholesterol well controlled, triglycerides slightly high  -Urine culture, MRSA negative    She discharger Lovenox bridge from [facility]. Aware to not take anything the day before surgery and the day of surgery with regards to anticoagulation.

## 2023-04-17 NOTE — TELEPHONE ENCOUNTER
Pre-op office note, labs, EKG, faxed to Dr. Rachana Boykin, confirmation received,.     Fax # [phone]  Ph # [phone]

## 2023-04-17 NOTE — TELEPHONE ENCOUNTER
To the pool, please send the preoperative paperwork to Dr. Murray Echols [facility] of surgery:    4/13/2023  Addended H&P  EKG    4/14  - Type and screen  - PT/INR  - MRSA Screen    4/15/2023  - CMP  - CBC  - Urinalysis  - Urine Culture

## 2023-04-19 ENCOUNTER — HOSPITAL ENCOUNTER (INPATIENT)
Facility: HOSPITAL | Age: 68
LOS: 2 days | Discharge: HOME | End: 2023-04-21
Attending: SURGERY | Admitting: SURGERY
Payer: MEDICARE

## 2023-04-19 ENCOUNTER — ANESTHESIA (OUTPATIENT)
Dept: SURGERY | Facility: HOSPITAL | Age: 68
End: 2023-04-19
Payer: MEDICARE

## 2023-04-19 ENCOUNTER — ANESTHESIA EVENT (OUTPATIENT)
Dept: SURGERY | Facility: HOSPITAL | Age: 68
End: 2023-04-19
Payer: MEDICARE

## 2023-04-19 DIAGNOSIS — K57.32 SIGMOID DIVERTICULITIS: ICD-10-CM

## 2023-04-19 DIAGNOSIS — Z01.818 PRE-OP TESTING: Primary | ICD-10-CM

## 2023-04-19 LAB — GLUCOSE BLDC GLUCOMTR-MCNC: 125 MG/DL (ref 70–99)

## 2023-04-19 PROCEDURE — 0DTN4ZZ RESECTION OF SIGMOID COLON, PERCUTANEOUS ENDOSCOPIC APPROACH: ICD-10-PCS | Performed by: SURGERY

## 2023-04-19 PROCEDURE — 3E0T3BZ INTRODUCTION OF ANESTHETIC AGENT INTO PERIPHERAL NERVES AND PLEXI, PERCUTANEOUS APPROACH: ICD-10-PCS | Performed by: SURGERY

## 2023-04-19 RX ORDER — FAMOTIDINE 20 MG/1
20 TABLET, FILM COATED ORAL 2 TIMES DAILY
Status: DISCONTINUED | OUTPATIENT
Start: 2023-04-19 | End: 2023-04-20

## 2023-04-19 RX ORDER — OXYCODONE HYDROCHLORIDE 5 MG/1
5 TABLET ORAL EVERY 4 HOURS PRN
Status: DISCONTINUED | OUTPATIENT
Start: 2023-04-19 | End: 2023-04-21

## 2023-04-19 RX ORDER — HYDROMORPHONE HYDROCHLORIDE 1 MG/ML
0.2 INJECTION, SOLUTION INTRAMUSCULAR; INTRAVENOUS; SUBCUTANEOUS EVERY 2 HOUR PRN
Status: DISCONTINUED | OUTPATIENT
Start: 2023-04-19 | End: 2023-04-21

## 2023-04-19 RX ORDER — HEPARIN SODIUM 5000 [USP'U]/ML
5000 INJECTION, SOLUTION INTRAVENOUS; SUBCUTANEOUS ONCE
Status: COMPLETED | OUTPATIENT
Start: 2023-04-19 | End: 2023-04-19

## 2023-04-19 RX ORDER — METOCLOPRAMIDE HYDROCHLORIDE 5 MG/ML
10 INJECTION INTRAMUSCULAR; INTRAVENOUS EVERY 8 HOURS PRN
Status: DISCONTINUED | OUTPATIENT
Start: 2023-04-19 | End: 2023-04-21

## 2023-04-19 RX ORDER — MELATONIN
3000 DAILY
Status: DISCONTINUED | OUTPATIENT
Start: 2023-04-20 | End: 2023-04-21

## 2023-04-19 RX ORDER — MORPHINE SULFATE 4 MG/ML
4 INJECTION, SOLUTION INTRAMUSCULAR; INTRAVENOUS EVERY 10 MIN PRN
Status: DISCONTINUED | OUTPATIENT
Start: 2023-04-19 | End: 2023-04-19 | Stop reason: HOSPADM

## 2023-04-19 RX ORDER — BUPIVACAINE HYDROCHLORIDE 5 MG/ML
INJECTION, SOLUTION EPIDURAL; INTRACAUDAL AS NEEDED
Status: DISCONTINUED | OUTPATIENT
Start: 2023-04-19 | End: 2023-04-19 | Stop reason: HOSPADM

## 2023-04-19 RX ORDER — LIDOCAINE HYDROCHLORIDE ANHYDROUS AND DEXTROSE MONOHYDRATE .8; 5 G/100ML; G/100ML
INJECTION, SOLUTION INTRAVENOUS CONTINUOUS PRN
Status: DISCONTINUED | OUTPATIENT
Start: 2023-04-19 | End: 2023-04-19

## 2023-04-19 RX ORDER — GLYCOPYRROLATE 0.2 MG/ML
INJECTION, SOLUTION INTRAMUSCULAR; INTRAVENOUS AS NEEDED
Status: DISCONTINUED | OUTPATIENT
Start: 2023-04-19 | End: 2023-04-19 | Stop reason: SURG

## 2023-04-19 RX ORDER — SODIUM CHLORIDE, SODIUM LACTATE, POTASSIUM CHLORIDE, CALCIUM CHLORIDE 600; 310; 30; 20 MG/100ML; MG/100ML; MG/100ML; MG/100ML
INJECTION, SOLUTION INTRAVENOUS CONTINUOUS
Status: DISCONTINUED | OUTPATIENT
Start: 2023-04-19 | End: 2023-04-20

## 2023-04-19 RX ORDER — ALPRAZOLAM 0.25 MG/1
0.25 TABLET ORAL DAILY PRN
Status: DISCONTINUED | OUTPATIENT
Start: 2023-04-19 | End: 2023-04-21

## 2023-04-19 RX ORDER — SENNOSIDES 8.6 MG
17.2 TABLET ORAL NIGHTLY PRN
Status: DISCONTINUED | OUTPATIENT
Start: 2023-04-19 | End: 2023-04-21

## 2023-04-19 RX ORDER — SODIUM CHLORIDE, SODIUM LACTATE, POTASSIUM CHLORIDE, CALCIUM CHLORIDE 600; 310; 30; 20 MG/100ML; MG/100ML; MG/100ML; MG/100ML
2 INJECTION, SOLUTION INTRAVENOUS CONTINUOUS
Status: DISCONTINUED | OUTPATIENT
Start: 2023-04-19 | End: 2023-04-20

## 2023-04-19 RX ORDER — HEPARIN SODIUM 5000 [USP'U]/ML
5000 INJECTION, SOLUTION INTRAVENOUS; SUBCUTANEOUS EVERY 8 HOURS SCHEDULED
Status: DISCONTINUED | OUTPATIENT
Start: 2023-04-20 | End: 2023-04-20

## 2023-04-19 RX ORDER — ACETAMINOPHEN 500 MG
1000 TABLET ORAL ONCE
Status: COMPLETED | OUTPATIENT
Start: 2023-04-19 | End: 2023-04-19

## 2023-04-19 RX ORDER — METOPROLOL TARTRATE 5 MG/5ML
2.5 INJECTION INTRAVENOUS ONCE
Status: DISCONTINUED | OUTPATIENT
Start: 2023-04-19 | End: 2023-04-19 | Stop reason: HOSPADM

## 2023-04-19 RX ORDER — HYDROMORPHONE HYDROCHLORIDE 1 MG/ML
0.4 INJECTION, SOLUTION INTRAMUSCULAR; INTRAVENOUS; SUBCUTANEOUS EVERY 2 HOUR PRN
Status: DISCONTINUED | OUTPATIENT
Start: 2023-04-19 | End: 2023-04-21

## 2023-04-19 RX ORDER — MORPHINE SULFATE 10 MG/ML
6 INJECTION, SOLUTION INTRAMUSCULAR; INTRAVENOUS EVERY 10 MIN PRN
Status: DISCONTINUED | OUTPATIENT
Start: 2023-04-19 | End: 2023-04-19 | Stop reason: HOSPADM

## 2023-04-19 RX ORDER — HYDROMORPHONE HYDROCHLORIDE 1 MG/ML
0.2 INJECTION, SOLUTION INTRAMUSCULAR; INTRAVENOUS; SUBCUTANEOUS EVERY 5 MIN PRN
Status: DISCONTINUED | OUTPATIENT
Start: 2023-04-19 | End: 2023-04-19 | Stop reason: HOSPADM

## 2023-04-19 RX ORDER — LISINOPRIL 20 MG/1
40 TABLET ORAL DAILY
Status: DISCONTINUED | OUTPATIENT
Start: 2023-04-20 | End: 2023-04-21

## 2023-04-19 RX ORDER — CELECOXIB 200 MG/1
200 CAPSULE ORAL ONCE
Status: COMPLETED | OUTPATIENT
Start: 2023-04-19 | End: 2023-04-19

## 2023-04-19 RX ORDER — HYDROCODONE BITARTRATE AND ACETAMINOPHEN 5; 325 MG/1; MG/1
1 TABLET ORAL EVERY 4 HOURS PRN
Qty: 12 TABLET | Refills: 0 | Status: SHIPPED | OUTPATIENT
Start: 2023-04-19 | End: 2023-05-01

## 2023-04-19 RX ORDER — DEXAMETHASONE SODIUM PHOSPHATE 4 MG/ML
VIAL (ML) INJECTION AS NEEDED
Status: DISCONTINUED | OUTPATIENT
Start: 2023-04-19 | End: 2023-04-19 | Stop reason: SURG

## 2023-04-19 RX ORDER — PRAVASTATIN SODIUM 20 MG
20 TABLET ORAL EVERY OTHER DAY
Status: DISCONTINUED | OUTPATIENT
Start: 2023-04-19 | End: 2023-04-21

## 2023-04-19 RX ORDER — NEOSTIGMINE METHYLSULFATE 1 MG/ML
INJECTION, SOLUTION INTRAVENOUS AS NEEDED
Status: DISCONTINUED | OUTPATIENT
Start: 2023-04-19 | End: 2023-04-19 | Stop reason: SURG

## 2023-04-19 RX ORDER — HYDROMORPHONE HYDROCHLORIDE 1 MG/ML
0.4 INJECTION, SOLUTION INTRAMUSCULAR; INTRAVENOUS; SUBCUTANEOUS EVERY 5 MIN PRN
Status: DISCONTINUED | OUTPATIENT
Start: 2023-04-19 | End: 2023-04-19 | Stop reason: HOSPADM

## 2023-04-19 RX ORDER — MAGNESIUM HYDROXIDE 1200 MG/15ML
LIQUID ORAL CONTINUOUS PRN
Status: DISCONTINUED | OUTPATIENT
Start: 2023-04-19 | End: 2023-04-19 | Stop reason: HOSPADM

## 2023-04-19 RX ORDER — FAMOTIDINE 10 MG/ML
20 INJECTION, SOLUTION INTRAVENOUS 2 TIMES DAILY
Status: DISCONTINUED | OUTPATIENT
Start: 2023-04-19 | End: 2023-04-20

## 2023-04-19 RX ORDER — CLINDAMYCIN PHOSPHATE 900 MG/50ML
900 INJECTION INTRAVENOUS ONCE
Status: COMPLETED | OUTPATIENT
Start: 2023-04-19 | End: 2023-04-19

## 2023-04-19 RX ORDER — HEPARIN SODIUM 1000 [USP'U]/ML
INJECTION, SOLUTION INTRAVENOUS; SUBCUTANEOUS AS NEEDED
Status: DISCONTINUED | OUTPATIENT
Start: 2023-04-19 | End: 2023-04-19 | Stop reason: HOSPADM

## 2023-04-19 RX ORDER — PROCHLORPERAZINE EDISYLATE 5 MG/ML
5 INJECTION INTRAMUSCULAR; INTRAVENOUS ONCE AS NEEDED
Status: DISCONTINUED | OUTPATIENT
Start: 2023-04-19 | End: 2023-04-19 | Stop reason: HOSPADM

## 2023-04-19 RX ORDER — ROCURONIUM BROMIDE 10 MG/ML
INJECTION, SOLUTION INTRAVENOUS AS NEEDED
Status: DISCONTINUED | OUTPATIENT
Start: 2023-04-19 | End: 2023-04-19 | Stop reason: SURG

## 2023-04-19 RX ORDER — POLYETHYLENE GLYCOL 3350 17 G/17G
17 POWDER, FOR SOLUTION ORAL DAILY PRN
Status: DISCONTINUED | OUTPATIENT
Start: 2023-04-19 | End: 2023-04-21

## 2023-04-19 RX ORDER — HYDROMORPHONE HYDROCHLORIDE 1 MG/ML
0.6 INJECTION, SOLUTION INTRAMUSCULAR; INTRAVENOUS; SUBCUTANEOUS EVERY 5 MIN PRN
Status: DISCONTINUED | OUTPATIENT
Start: 2023-04-19 | End: 2023-04-19 | Stop reason: HOSPADM

## 2023-04-19 RX ORDER — SODIUM CHLORIDE, SODIUM LACTATE, POTASSIUM CHLORIDE, CALCIUM CHLORIDE 600; 310; 30; 20 MG/100ML; MG/100ML; MG/100ML; MG/100ML
INJECTION, SOLUTION INTRAVENOUS CONTINUOUS
Status: DISCONTINUED | OUTPATIENT
Start: 2023-04-19 | End: 2023-04-19 | Stop reason: HOSPADM

## 2023-04-19 RX ORDER — DOCUSATE SODIUM 100 MG/1
100 CAPSULE, LIQUID FILLED ORAL 2 TIMES DAILY
Qty: 14 CAPSULE | Refills: 1 | Status: SHIPPED | OUTPATIENT
Start: 2023-04-19 | End: 2023-05-03

## 2023-04-19 RX ORDER — ONDANSETRON 2 MG/ML
INJECTION INTRAMUSCULAR; INTRAVENOUS AS NEEDED
Status: DISCONTINUED | OUTPATIENT
Start: 2023-04-19 | End: 2023-04-19 | Stop reason: SURG

## 2023-04-19 RX ORDER — MORPHINE SULFATE 4 MG/ML
2 INJECTION, SOLUTION INTRAMUSCULAR; INTRAVENOUS EVERY 10 MIN PRN
Status: DISCONTINUED | OUTPATIENT
Start: 2023-04-19 | End: 2023-04-19 | Stop reason: HOSPADM

## 2023-04-19 RX ORDER — MAGNESIUM OXIDE 400 MG/1
400 TABLET ORAL DAILY
Status: DISCONTINUED | OUTPATIENT
Start: 2023-04-19 | End: 2023-04-21

## 2023-04-19 RX ORDER — OXYCODONE HYDROCHLORIDE 5 MG/1
2.5 TABLET ORAL EVERY 4 HOURS PRN
Status: DISCONTINUED | OUTPATIENT
Start: 2023-04-19 | End: 2023-04-21

## 2023-04-19 RX ORDER — ONDANSETRON 2 MG/ML
4 INJECTION INTRAMUSCULAR; INTRAVENOUS EVERY 6 HOURS PRN
Status: DISCONTINUED | OUTPATIENT
Start: 2023-04-19 | End: 2023-04-19

## 2023-04-19 RX ORDER — MIDAZOLAM HYDROCHLORIDE 1 MG/ML
INJECTION INTRAMUSCULAR; INTRAVENOUS AS NEEDED
Status: DISCONTINUED | OUTPATIENT
Start: 2023-04-19 | End: 2023-04-19 | Stop reason: SURG

## 2023-04-19 RX ORDER — ACETAMINOPHEN 500 MG
500 TABLET ORAL EVERY 4 HOURS PRN
Status: DISCONTINUED | OUTPATIENT
Start: 2023-04-19 | End: 2023-04-21

## 2023-04-19 RX ORDER — ONDANSETRON 2 MG/ML
4 INJECTION INTRAMUSCULAR; INTRAVENOUS EVERY 6 HOURS PRN
Status: DISCONTINUED | OUTPATIENT
Start: 2023-04-19 | End: 2023-04-21

## 2023-04-19 RX ORDER — NALOXONE HYDROCHLORIDE 0.4 MG/ML
80 INJECTION, SOLUTION INTRAMUSCULAR; INTRAVENOUS; SUBCUTANEOUS AS NEEDED
Status: DISCONTINUED | OUTPATIENT
Start: 2023-04-19 | End: 2023-04-19 | Stop reason: HOSPADM

## 2023-04-19 RX ORDER — ONDANSETRON 2 MG/ML
4 INJECTION INTRAMUSCULAR; INTRAVENOUS ONCE AS NEEDED
Status: DISCONTINUED | OUTPATIENT
Start: 2023-04-19 | End: 2023-04-19 | Stop reason: HOSPADM

## 2023-04-19 RX ORDER — LIDOCAINE HYDROCHLORIDE ANHYDROUS AND DEXTROSE MONOHYDRATE .8; 5 G/100ML; G/100ML
INJECTION, SOLUTION INTRAVENOUS CONTINUOUS PRN
Status: DISCONTINUED | OUTPATIENT
Start: 2023-04-19 | End: 2023-04-19 | Stop reason: SURG

## 2023-04-19 RX ORDER — HALOPERIDOL 5 MG/ML
0.5 INJECTION INTRAMUSCULAR ONCE AS NEEDED
Status: DISCONTINUED | OUTPATIENT
Start: 2023-04-19 | End: 2023-04-19 | Stop reason: HOSPADM

## 2023-04-19 RX ORDER — ALVIMOPAN 12 MG/1
12 CAPSULE ORAL 2 TIMES DAILY
Status: DISCONTINUED | OUTPATIENT
Start: 2023-04-19 | End: 2023-04-20

## 2023-04-19 RX ORDER — LIDOCAINE HYDROCHLORIDE 10 MG/ML
INJECTION, SOLUTION EPIDURAL; INFILTRATION; INTRACAUDAL; PERINEURAL AS NEEDED
Status: DISCONTINUED | OUTPATIENT
Start: 2023-04-19 | End: 2023-04-19 | Stop reason: SURG

## 2023-04-19 RX ADMIN — ROCURONIUM BROMIDE 50 MG: 10 INJECTION, SOLUTION INTRAVENOUS at 12:23:00

## 2023-04-19 RX ADMIN — ONDANSETRON 4 MG: 2 INJECTION INTRAMUSCULAR; INTRAVENOUS at 15:51:00

## 2023-04-19 RX ADMIN — GLYCOPYRROLATE 0.5 MG: 0.2 INJECTION, SOLUTION INTRAMUSCULAR; INTRAVENOUS at 15:52:00

## 2023-04-19 RX ADMIN — DEXAMETHASONE SODIUM PHOSPHATE 4 MG: 4 MG/ML VIAL (ML) INJECTION at 13:00:00

## 2023-04-19 RX ADMIN — SODIUM CHLORIDE, SODIUM LACTATE, POTASSIUM CHLORIDE, CALCIUM CHLORIDE: 600; 310; 30; 20 INJECTION, SOLUTION INTRAVENOUS at 12:15:00

## 2023-04-19 RX ADMIN — LIDOCAINE HYDROCHLORIDE ANHYDROUS AND DEXTROSE MONOHYDRATE 1 MG/MIN: .8; 5 INJECTION, SOLUTION INTRAVENOUS at 13:05:00

## 2023-04-19 RX ADMIN — MIDAZOLAM HYDROCHLORIDE 1 MG: 1 INJECTION INTRAMUSCULAR; INTRAVENOUS at 12:19:00

## 2023-04-19 RX ADMIN — CLINDAMYCIN PHOSPHATE 900 MG: 900 INJECTION INTRAVENOUS at 12:34:00

## 2023-04-19 RX ADMIN — SODIUM CHLORIDE, SODIUM LACTATE, POTASSIUM CHLORIDE, CALCIUM CHLORIDE: 600; 310; 30; 20 INJECTION, SOLUTION INTRAVENOUS at 15:39:00

## 2023-04-19 RX ADMIN — SODIUM CHLORIDE, SODIUM LACTATE, POTASSIUM CHLORIDE, CALCIUM CHLORIDE: 600; 310; 30; 20 INJECTION, SOLUTION INTRAVENOUS at 13:05:00

## 2023-04-19 RX ADMIN — NEOSTIGMINE METHYLSULFATE 2.5 MG: 1 INJECTION, SOLUTION INTRAVENOUS at 15:52:00

## 2023-04-19 RX ADMIN — LIDOCAINE HYDROCHLORIDE 50 MG: 10 INJECTION, SOLUTION EPIDURAL; INFILTRATION; INTRACAUDAL; PERINEURAL at 12:22:00

## 2023-04-19 NOTE — OPERATIVE REPORT
[facility] OPERATIVE REPORT:     PATIENT NAME: Yannick Purvis  : 7/15/1955   MRN: [id number]    DATE OF OPERATION:   23    PREOPERATIVE DIAGNOSIS: Recurrent Sigmoid Diverticulitis with stricture     POSTOPERATIVE DIAGNOSIS: Recurrent Sigmoid Diverticulitis with sigmoid colon phlegmon and stricture     PROCEDURE PERFORMED: Laparoscopic Sigmoid Colectomy, Takedown of Splenic Flexure and Bilateral TAP blocks     SURGEON:  Carmelo Smith MD    ASST: Seymour Aquino PA-C (Assistant helped position patient and helped with positioning, camera, intraoperative retraction, suturing, wound closure, etc)    ANESTHESIA: General anesthesia    ESTIMATED BLOOD LOSS:   200 ml    The patient and her son understood the indications, details, potential risks and complications of surgery including bleeding, infection, leak, sepsis, DVT/PE, MI, damage to surrounding structures, possible need for open procedure, possible need for reoperation and colostomy, etc. The patient and her son consented to the operation. The patient was brought to the operating room and was induced under general anesthesia. The patient was placed in lithotomy position and the abdomen and perineum were prepped and draped in the usual sterile fashion after Pinzon catheter was inserted. The abdomen was insuflated after insertion of GelPort. Ports included 3 5 mm ports in periumbilical and epigastric sites and RLQ. A small incision was made in the Pfannenstiel position and GelPort was inserted. Bilateral TAP blocks were done with Exparel mixed with 0.5% Marcaine plain, 20 ml on each side. The proximal rectum was dissected and skeletonized and then the mesentery of the proximal rectum was divided with Ligasure. The mesentery of the sigmoid was mobilized from the retroperitoneum and divided with Ligasure. The left ureter was seen and was preserved.  The descending colon was mobilized from its retro-peritoneal attachments and the splenic flexure was mobilized completely into the lesser sac and off the tail of the pancreas. The rectum was divided with linear stapler and the specimen was extracted from the Pfannenstiel incision and the proximal normal colon was dissected and pursestring instrument was placed and the proximal colon was transected and specimen was sent off the operative field. The proximal colon was sized up to size 29 mm and a 29 mm anvil was placed and the pursestring was tied and a second pursestring was placed. Then, an end to end anastomosis was created using an EEA stapler placed transanally in the rectum. Air leak test was negative for air leak. Suture line was oversewn using using 2-0 PDS sutures. Laparoscopy was used for irrigation and suction and then all ports were removed. The Pfannenstiel incision was closed using 0 PDS with interrupted #1 Vicryl sutures for the anterior rectus sheath fascia. Skin was irrigated and closed using 4-0 Vicryl subarticular sutures. The wounds were dressed with Dermabond for the laparoscopic port sites and Mastisol, Steri-Strips and Tegaderm dressing for the Pfannenstiel incision. The patient tolerated procedure well went to recovery room in stable condition.     Helena Galaviz MD [address] [place] [address]  04/19/23  11:35 AM

## 2023-04-19 NOTE — DISCHARGE INSTRUCTIONS
Dr. Everton Dumont Resection Post-Op Instructions    Diet:    Low Residue Diet (low fiber diet) for 3 weeks. If you develop nausea, stick to liquids until the nausea goes away. Activity:    May walk and may climb stairs, Avoid heavy lifting greater than 15 lbs, and Avoid bending or straining for 6 weeks. No driving for 5 days and until you have stopped taking prescription pain medications. You may shower the day after your surgery. Medications: You may restart taking Xerelto (Rivaroxaban) and all your previous medications tomorrow morning following hospitalization at your regular time and dose unless instructed otherwise. To manage pain you may use the following as a guideline: Ice several times a day for 20-30 minute periods. Tylenol - you may take extra strength Tylenol up to 1000 mg every 8 hours. Do not exceed 4000 mg of Tylenol in a 24-hour period. Prescription pain medication - only use for severe breakthrough pain    Please be aware that the prescription pain medication contains the equivalent to 1 dose of Tylenol (325mg). Do not exceed 4000mg of Tylenol in a 24 hour period    Please note, prescription pain medication can make you nauseated, bloated and constipated. A stool softener will be sent to your pharmacy to help avoid constipation. Examples of severe pain include, unable to sleep due to pain, or waking up in the middle of the night due to pain. Take it with food and discontinue if side effects occur. No alcohol or driving while taking prescription pain medications. Do not take Aleve or Advil as it may affect the anastomosis healing. Dressing:   Place ice pack to operative site 3 times a day for the first 48 hours. You may shower tomorrow with the operative site away from the shower head. Do not submerge your wounds in water (i.e. no baths, swimming, or water aerobics) for 4 weeks. If skin glue was used, do not remove it as it will fall off on its own.     All incisions become somewhat hard and sometimes lumpy. That is part of the normal healing process. Bruising around the incisions or lower is also normal and should resolve with time. Low grade fever up to 101F is normal after surgery. Severe swelling, fever > 101.5, redness or drainage from wound should be reported to your surgeon. Call the office number during and after hours if needed. Follow-up:      Call the office at . Make appointment to see Dr. Sandra Gamboa in 6 weeks.

## 2023-04-19 NOTE — ANESTHESIA PROCEDURE NOTES
Airway  Date/Time: 4/19/2023 12:26 PM  Urgency: elective      General Information and Staff    Patient location during procedure: OR  Anesthesiologist: Silvio López MD  Resident/CRNA: Alda Rodríguez CRNA  Performed: CRNA   Performed by: Alda Rodríguez CRNA  Authorized by: Silvio López MD      Indications and Patient Condition  Indications for airway management: airway protection and anesthesia  Spontaneous ventilation: present  Sedation level: deep  Preoxygenated: yes  Patient position: sniffing  Mask difficulty assessment: 1 - vent by mask    Final Airway Details  Final airway type: endotracheal airway      Successful airway: ETT  Cuffed: yes   Successful intubation technique: Video laryngoscopy  Blade: GlideScope  Blade size: #3  ETT size (mm): 7.5    Cormack-Lehane Classification: grade I - full view of glottis  Placement verified by: chest auscultation and capnometry   Cuff volume (mL): 8  Measured from: teeth  ETT to teeth (cm): 21  Number of attempts at approach: 1  Ventilation between attempts: BVM  Number of other approaches attempted: 1    Other Attempts  Unsuccessful attempted endotracheal techniques: direct laryngoscopy

## 2023-04-20 LAB
ALBUMIN SERPL-MCNC: 2.2 G/DL (ref 3.4–5)
ALBUMIN/GLOB SERPL: 0.6 {RATIO} (ref 1–2)
ALP LIVER SERPL-CCNC: 78 U/L
ALT SERPL-CCNC: 14 U/L
ANION GAP SERPL CALC-SCNC: 7 MMOL/L (ref 0–18)
AST SERPL-CCNC: 21 U/L (ref 15–37)
BASOPHILS # BLD AUTO: 0.01 X10(3) UL (ref 0–0.2)
BASOPHILS NFR BLD AUTO: 0.1 %
BILIRUB SERPL-MCNC: 0.3 MG/DL (ref 0.1–2)
BUN BLD-MCNC: 7 MG/DL (ref 7–18)
BUN/CREAT SERPL: 8.9 (ref 10–20)
CALCIUM BLD-MCNC: 8.7 MG/DL (ref 8.5–10.1)
CHLORIDE SERPL-SCNC: 110 MMOL/L (ref 98–112)
CO2 SERPL-SCNC: 25 MMOL/L (ref 21–32)
CREAT BLD-MCNC: 0.79 MG/DL
DEPRECATED HBV CORE AB SER IA-ACNC: 73.8 NG/ML
DEPRECATED RDW RBC AUTO: 53.2 FL (ref 35.1–46.3)
EOSINOPHIL # BLD AUTO: 0 X10(3) UL (ref 0–0.7)
EOSINOPHIL NFR BLD AUTO: 0 %
ERYTHROCYTE [DISTWIDTH] IN BLOOD BY AUTOMATED COUNT: 16.8 % (ref 11–15)
GFR SERPLBLD BASED ON 1.73 SQ M-ARVRAT: 81 ML/MIN/1.73M2 (ref 60–?)
GLOBULIN PLAS-MCNC: 4 G/DL (ref 2.8–4.4)
GLUCOSE BLD-MCNC: 103 MG/DL (ref 70–99)
HCT VFR BLD AUTO: 30.5 %
HGB BLD-MCNC: 9.2 G/DL
IMM GRANULOCYTES # BLD AUTO: 0.02 X10(3) UL (ref 0–1)
IMM GRANULOCYTES NFR BLD: 0.2 %
IRON SATN MFR SERPL: 9 %
IRON SERPL-MCNC: 27 UG/DL
LYMPHOCYTES # BLD AUTO: 0.58 X10(3) UL (ref 1–4)
LYMPHOCYTES NFR BLD AUTO: 5.6 %
MAGNESIUM SERPL-MCNC: 2 MG/DL (ref 1.6–2.6)
MCH RBC QN AUTO: 25.8 PG (ref 26–34)
MCHC RBC AUTO-ENTMCNC: 30.2 G/DL (ref 31–37)
MCV RBC AUTO: 85.7 FL
MONOCYTES # BLD AUTO: 0.72 X10(3) UL (ref 0.1–1)
MONOCYTES NFR BLD AUTO: 6.9 %
NEUTROPHILS # BLD AUTO: 9.09 X10 (3) UL (ref 1.5–7.7)
NEUTROPHILS # BLD AUTO: 9.09 X10(3) UL (ref 1.5–7.7)
NEUTROPHILS NFR BLD AUTO: 87.2 %
OSMOLALITY SERPL CALC.SUM OF ELEC: 292 MOSM/KG (ref 275–295)
PHOSPHATE SERPL-MCNC: 4 MG/DL (ref 2.5–4.9)
PLATELET # BLD AUTO: 223 10(3)UL (ref 150–450)
POTASSIUM SERPL-SCNC: 4.3 MMOL/L (ref 3.5–5.1)
PROT SERPL-MCNC: 6.2 G/DL (ref 6.4–8.2)
RBC # BLD AUTO: 3.56 X10(6)UL
SODIUM SERPL-SCNC: 142 MMOL/L (ref 136–145)
TIBC SERPL-MCNC: 311 UG/DL (ref 240–450)
TRANSFERRIN SERPL-MCNC: 209 MG/DL (ref 200–360)
WBC # BLD AUTO: 10.4 X10(3) UL (ref 4–11)

## 2023-04-20 PROCEDURE — 99223 1ST HOSP IP/OBS HIGH 75: CPT | Performed by: INTERNAL MEDICINE

## 2023-04-20 RX ORDER — CETIRIZINE HYDROCHLORIDE 10 MG/1
10 TABLET ORAL DAILY
Status: DISCONTINUED | OUTPATIENT
Start: 2023-04-20 | End: 2023-04-21

## 2023-04-20 RX ORDER — PANTOPRAZOLE SODIUM 40 MG/1
40 TABLET, DELAYED RELEASE ORAL
Status: DISCONTINUED | OUTPATIENT
Start: 2023-04-20 | End: 2023-04-21

## 2023-04-20 NOTE — PLAN OF CARE
Problem: Patient Centered Care  Goal: Patient preferences are identified and integrated in the patient's plan of care  Description: Interventions:  - What would you like us to know as we care for you?   - Provide timely, complete, and accurate information to patient/family  - Incorporate patient and family knowledge, values, beliefs, and cultural backgrounds into the planning and delivery of care  - Encourage patient/family to participate in care and decision-making at the level they choose  - Honor patient and family perspectives and choices  Outcome: Progressing     Problem: Patient/Family Goals  Goal: Patient/Family Long Term Goal  Description: Patient's Long Term Goal:     Interventions:  -   - See additional Care Plan goals for specific interventions  Outcome: Progressing  Goal: Patient/Family Short Term Goal  Description: Patient's Short Term Goal:     Interventions:   -  - See additional Care Plan goals for specific interventions  Outcome: Progressing     Problem: PAIN - ADULT  Goal: Verbalizes/displays adequate comfort level or patient's stated pain goal  Description: INTERVENTIONS:  - Encourage pt to monitor pain and request assistance  - Assess pain using appropriate pain scale  - Administer analgesics based on type and severity of pain and evaluate response  - Implement non-pharmacological measures as appropriate and evaluate response  - Consider cultural and social influences on pain and pain management  - Manage/alleviate anxiety  - Utilize distraction and/or relaxation techniques  - Monitor for opioid side effects  - Notify MD/LIP if interventions unsuccessful or patient reports new pain  - Anticipate increased pain with activity and pre-medicate as appropriate  Outcome: Progressing     Problem: RISK FOR INFECTION - ADULT  Goal: Absence of fever/infection during anticipated neutropenic period  Description: INTERVENTIONS  - Monitor WBC  - Administer growth factors as ordered  - Implement neutropenic guidelines  Outcome: Progressing     Problem: SAFETY ADULT - FALL  Goal: Free from fall injury  Description: INTERVENTIONS:  - Assess pt frequently for physical needs  - Identify cognitive and physical deficits and behaviors that affect risk of falls.   - [place] fall precautions as indicated by assessment.  - Educate pt/family on patient safety including physical limitations  - Instruct pt to call for assistance with activity based on assessment  - Modify environment to reduce risk of injury  - Provide assistive devices as appropriate  - Consider OT/PT consult to assist with strengthening/mobility  - Encourage toileting schedule  Outcome: Progressing     Problem: DISCHARGE PLANNING  Goal: Discharge to home or other facility with appropriate resources  Description: INTERVENTIONS:  - Identify barriers to discharge w/pt and caregiver  - Include patient/family/discharge partner in discharge planning  - Arrange for needed discharge resources and transportation as appropriate  - Identify discharge learning needs (meds, wound care, etc)  - Arrange for interpreters to assist at discharge as needed  - Consider post-discharge preferences of patient/family/discharge partner  - Complete POLST form as appropriate  - Assess patient's ability to be responsible for managing their own health  - Refer to Case Management Department for coordinating discharge planning if the patient needs post-hospital services based on physician/LIP order or complex needs related to functional status, cognitive ability or social support system  Outcome: Progressing     Problem: GASTROINTESTINAL - ADULT  Goal: Minimal or absence of nausea and vomiting  Description: INTERVENTIONS:  - Maintain adequate hydration with IV or PO as ordered and tolerated  - Nasogastric tube to low intermittent suction as ordered  - Evaluate effectiveness of ordered antiemetic medications  - Provide nonpharmacologic comfort measures as appropriate  - Advance diet as tolerated, if ordered  - Obtain nutritional consult as needed  - Evaluate fluid balance  Outcome: Progressing  Goal: Maintains or returns to baseline bowel function  Description: INTERVENTIONS:  - Assess bowel function  - Maintain adequate hydration with IV or PO as ordered and tolerated  - Evaluate effectiveness of GI medications  - Encourage mobilization and activity  - Obtain nutritional consult as needed  - Establish a toileting routine/schedule  - Consider collaborating with pharmacy to review patient's medication profile  Outcome: Progressing     Problem: SKIN/TISSUE INTEGRITY - ADULT  Goal: Skin integrity remains intact  Description: INTERVENTIONS  - Assess and document risk factors for pressure ulcer development  - Assess and document skin integrity  - Monitor for areas of redness and/or skin breakdown  - Initiate interventions, skin care algorithm/standards of care as needed  Outcome: Progressing  Goal: Incision(s), wounds(s) or drain site(s) healing without S/S of infection  Description: INTERVENTIONS:  - Assess and document risk factors for pressure ulcer development  - Assess and document skin integrity  - Assess and document dressing/incision, wound bed, drain sites and surrounding tissue  - Implement wound care per orders  - Initiate isolation precautions as appropriate  - Initiate Pressure Ulcer prevention bundle as indicated  Outcome: Progressing

## 2023-04-20 NOTE — PLAN OF CARE
Admitted to 442 from PACU. Oriented to room and safety precautions. Son at bedside. Laps to abdomen clean/dry/intact. [place] in place with good UOP. IVF infusing. Tolerating ice chips. Denies pain. Blood sugar 125, accuchecks discontinued per protocol. Encouraged pt to sit in chair this evening. SCDs in place. Bed in lowest position, call light in reach, frequent rounding, nonskid footwear, fall precautions in place.

## 2023-04-20 NOTE — PLAN OF CARE
Patient's pain managed with oxy. Denies of nausea. [place] intact, taken out this morning. Tolerating clears. IV antibiotics infusing. Up with x1 with a walker. Safety precaution in place.      Problem: Patient Centered Care  Goal: Patient preferences are identified and integrated in the patient's plan of care  Description: Interventions:  - What would you like us to know as we care for you?   - Provide timely, complete, and accurate information to patient/family  - Incorporate patient and family knowledge, values, beliefs, and cultural backgrounds into the planning and delivery of care  - Encourage patient/family to participate in care and decision-making at the level they choose  - Honor patient and family perspectives and choices  Outcome: Progressing     Problem: Patient/Family Goals  Goal: Patient/Family Long Term Goal  Description: Patient's Long Term Goal:     Interventions:  - See additional Care Plan goals for specific interventions  Outcome: Progressing  Goal: Patient/Family Short Term Goal  Description: Patient's Short Term Goal:     Interventions:   - See additional Care Plan goals for specific interventions  Outcome: Progressing

## 2023-04-21 ENCOUNTER — TELEPHONE (OUTPATIENT)
Dept: INTERNAL MEDICINE CLINIC | Facility: CLINIC | Age: 68
End: 2023-04-21

## 2023-04-21 VITALS
WEIGHT: 164 LBS | HEART RATE: 76 BPM | SYSTOLIC BLOOD PRESSURE: 99 MMHG | TEMPERATURE: 99 F | BODY MASS INDEX: 28 KG/M2 | DIASTOLIC BLOOD PRESSURE: 50 MMHG | RESPIRATION RATE: 18 BRPM | HEIGHT: 64 IN | OXYGEN SATURATION: 95 %

## 2023-04-21 LAB
ALBUMIN SERPL-MCNC: 2.3 G/DL (ref 3.4–5)
ALBUMIN/GLOB SERPL: 0.5 {RATIO} (ref 1–2)
ALP LIVER SERPL-CCNC: 79 U/L
ALT SERPL-CCNC: 15 U/L
ANION GAP SERPL CALC-SCNC: 6 MMOL/L (ref 0–18)
AST SERPL-CCNC: 19 U/L (ref 15–37)
BASOPHILS # BLD AUTO: 0.02 X10(3) UL (ref 0–0.2)
BASOPHILS NFR BLD AUTO: 0.2 %
BILIRUB SERPL-MCNC: 0.4 MG/DL (ref 0.1–2)
BUN BLD-MCNC: 10 MG/DL (ref 7–18)
BUN/CREAT SERPL: 12.5 (ref 10–20)
C DIFF TOX B STL QL: NEGATIVE
CALCIUM BLD-MCNC: 8.9 MG/DL (ref 8.5–10.1)
CHLORIDE SERPL-SCNC: 107 MMOL/L (ref 98–112)
CO2 SERPL-SCNC: 27 MMOL/L (ref 21–32)
CREAT BLD-MCNC: 0.8 MG/DL
DEPRECATED RDW RBC AUTO: 53.1 FL (ref 35.1–46.3)
EOSINOPHIL # BLD AUTO: 0.13 X10(3) UL (ref 0–0.7)
EOSINOPHIL NFR BLD AUTO: 1.2 %
ERYTHROCYTE [DISTWIDTH] IN BLOOD BY AUTOMATED COUNT: 17.3 % (ref 11–15)
GFR SERPLBLD BASED ON 1.73 SQ M-ARVRAT: 80 ML/MIN/1.73M2 (ref 60–?)
GLOBULIN PLAS-MCNC: 4.2 G/DL (ref 2.8–4.4)
GLUCOSE BLD-MCNC: 90 MG/DL (ref 70–99)
HCT VFR BLD AUTO: 29.3 %
HGB BLD-MCNC: 9 G/DL
IMM GRANULOCYTES # BLD AUTO: 0.04 X10(3) UL (ref 0–1)
IMM GRANULOCYTES NFR BLD: 0.4 %
LYMPHOCYTES # BLD AUTO: 0.9 X10(3) UL (ref 1–4)
LYMPHOCYTES NFR BLD AUTO: 8.4 %
MAGNESIUM SERPL-MCNC: 1.8 MG/DL (ref 1.6–2.6)
MCH RBC QN AUTO: 25.8 PG (ref 26–34)
MCHC RBC AUTO-ENTMCNC: 30.7 G/DL (ref 31–37)
MCV RBC AUTO: 84 FL
MONOCYTES # BLD AUTO: 0.82 X10(3) UL (ref 0.1–1)
MONOCYTES NFR BLD AUTO: 7.7 %
NEUTROPHILS # BLD AUTO: 8.8 X10 (3) UL (ref 1.5–7.7)
NEUTROPHILS # BLD AUTO: 8.8 X10(3) UL (ref 1.5–7.7)
NEUTROPHILS NFR BLD AUTO: 82.1 %
OSMOLALITY SERPL CALC.SUM OF ELEC: 289 MOSM/KG (ref 275–295)
PHOSPHATE SERPL-MCNC: 2.2 MG/DL (ref 2.5–4.9)
PLATELET # BLD AUTO: 225 10(3)UL (ref 150–450)
POTASSIUM SERPL-SCNC: 4.1 MMOL/L (ref 3.5–5.1)
PROT SERPL-MCNC: 6.5 G/DL (ref 6.4–8.2)
RBC # BLD AUTO: 3.49 X10(6)UL
SODIUM SERPL-SCNC: 140 MMOL/L (ref 136–145)
WBC # BLD AUTO: 10.7 X10(3) UL (ref 4–11)

## 2023-04-21 PROCEDURE — 99239 HOSP IP/OBS DSCHRG MGMT >30: CPT | Performed by: INTERNAL MEDICINE

## 2023-04-21 RX ORDER — TRAMADOL HYDROCHLORIDE 50 MG/1
50 TABLET ORAL EVERY 6 HOURS PRN
Qty: 12 TABLET | Refills: 0 | Status: SHIPPED | OUTPATIENT
Start: 2023-04-21

## 2023-04-21 RX ORDER — HYDROCODONE BITARTRATE AND ACETAMINOPHEN 5; 325 MG/1; MG/1
1 TABLET ORAL EVERY 6 HOURS PRN
Qty: 12 TABLET | Refills: 0 | Status: SHIPPED | OUTPATIENT
Start: 2023-04-21

## 2023-04-21 NOTE — PROGRESS NOTES
Patient alert and oriented X4, vitals stable. Pain controlled with Oxy. Tolerating diet but only eats small amount at a time. Ambulating independently. Voiding freely. Passing flatus but states \"not a lot\". Patient cleared for discharge. Discharge instructions reviewed with patient including need to schedule follow up appointments; new, continued and discontinued medications; incision care and when to seek medical attention. Patient escorted to lobby via wheelchair, all belongings with patient.

## 2023-04-21 NOTE — PLAN OF CARE
Pt A/O X4 , ambulating, voiding freely, Oxy for pain. Three lap-sites clean dry and intact. IV. Saline locked . Safety measures in place. Will continue present monitoring.    Problem: Patient Centered Care  Goal: Patient preferences are identified and integrated in the patient's plan of care  Description: Interventions:  - What would you like us to know as we care for you?   - Provide timely, complete, and accurate information to patient/family  - Incorporate patient and family knowledge, values, beliefs, and cultural backgrounds into the planning and delivery of care  - Encourage patient/family to participate in care and decision-making at the level they choose  - Honor patient and family perspectives and choices  Outcome: Progressing     Problem: Patient/Family Goals  Goal: Patient/Family Long Term Goal  Description: Patient's Long Term Goal:     Interventions:  -   - See additional Care Plan goals for specific interventions  Outcome: Progressing  Goal: Patient/Family Short Term Goal  Description: Patient's Short Term Goal:     Interventions:   -   - See additional Care Plan goals for specific interventions  Outcome: Progressing     Problem: PAIN - ADULT  Goal: Verbalizes/displays adequate comfort level or patient's stated pain goal  Description: INTERVENTIONS:  - Encourage pt to monitor pain and request assistance  - Assess pain using appropriate pain scale  - Administer analgesics based on type and severity of pain and evaluate response  - Implement non-pharmacological measures as appropriate and evaluate response  - Consider cultural and social influences on pain and pain management  - Manage/alleviate anxiety  - Utilize distraction and/or relaxation techniques  - Monitor for opioid side effects  - Notify MD/LIP if interventions unsuccessful or patient reports new pain  - Anticipate increased pain with activity and pre-medicate as appropriate  Outcome: Progressing     Problem: RISK FOR INFECTION - ADULT  Goal: Absence of fever/infection during anticipated neutropenic period  Description: INTERVENTIONS  - Monitor WBC  - Administer growth factors as ordered  - Implement neutropenic guidelines  Outcome: Progressing     Problem: SAFETY ADULT - FALL  Goal: Free from fall injury  Description: INTERVENTIONS:  - Assess pt frequently for physical needs  - Identify cognitive and physical deficits and behaviors that affect risk of falls.   - [place] fall precautions as indicated by assessment.  - Educate pt/family on patient safety including physical limitations  - Instruct pt to call for assistance with activity based on assessment  - Modify environment to reduce risk of injury  - Provide assistive devices as appropriate  - Consider OT/PT consult to assist with strengthening/mobility  - Encourage toileting schedule  Outcome: Progressing     Problem: DISCHARGE PLANNING  Goal: Discharge to home or other facility with appropriate resources  Description: INTERVENTIONS:  - Identify barriers to discharge w/pt and caregiver  - Include patient/family/discharge partner in discharge planning  - Arrange for needed discharge resources and transportation as appropriate  - Identify discharge learning needs (meds, wound care, etc)  - Arrange for interpreters to assist at discharge as needed  - Consider post-discharge preferences of patient/family/discharge partner  - Complete POLST form as appropriate  - Assess patient's ability to be responsible for managing their own health  - Refer to Case Management Department for coordinating discharge planning if the patient needs post-hospital services based on physician/LIP order or complex needs related to functional status, cognitive ability or social support system  Outcome: Progressing     Problem: GASTROINTESTINAL - ADULT  Goal: Minimal or absence of nausea and vomiting  Description: INTERVENTIONS:  - Maintain adequate hydration with IV or PO as ordered and tolerated  - Nasogastric tube to low intermittent suction as ordered  - Evaluate effectiveness of ordered antiemetic medications  - Provide nonpharmacologic comfort measures as appropriate  - Advance diet as tolerated, if ordered  - Obtain nutritional consult as needed  - Evaluate fluid balance  Outcome: Progressing  Goal: Maintains or returns to baseline bowel function  Description: INTERVENTIONS:  - Assess bowel function  - Maintain adequate hydration with IV or PO as ordered and tolerated  - Evaluate effectiveness of GI medications  - Encourage mobilization and activity  - Obtain nutritional consult as needed  - Establish a toileting routine/schedule  - Consider collaborating with pharmacy to review patient's medication profile  Outcome: Progressing     Problem: SKIN/TISSUE INTEGRITY - ADULT  Goal: Skin integrity remains intact  Description: INTERVENTIONS  - Assess and document risk factors for pressure ulcer development  - Assess and document skin integrity  - Monitor for areas of redness and/or skin breakdown  - Initiate interventions, skin care algorithm/standards of care as needed  Outcome: Progressing  Goal: Incision(s), wounds(s) or drain site(s) healing without S/S of infection  Description: INTERVENTIONS:  - Assess and document risk factors for pressure ulcer development  - Assess and document skin integrity  - Assess and document dressing/incision, wound bed, drain sites and surrounding tissue  - Implement wound care per orders  - Initiate isolation precautions as appropriate  - Initiate Pressure Ulcer prevention bundle as indicated  Outcome: Progressing

## 2023-04-22 NOTE — TELEPHONE ENCOUNTER
Pt called on call. Was just d/c'ed from [facility] today after colon resection. Tried to fill her hydrocodone 5/325 at [facility] in [place] but was told they do not have the med in stock. Pt tried to call Dr. Ashley Lindsey a few hours ago but no response. Pt asks that the Rx be sent to [facility] in [place] on [address]  Rx sent to [facility] for #12 tabs of norco 5/325mg  Called [facility] -- pharmacy closed.  Left meesage on  to cancel that Rx

## 2023-04-24 ENCOUNTER — PATIENT OUTREACH (OUTPATIENT)
Dept: CASE MANAGEMENT | Age: 68
End: 2023-04-24

## 2023-04-24 DIAGNOSIS — Z02.9 ENCOUNTERS FOR UNSPECIFIED ADMINISTRATIVE PURPOSE: ICD-10-CM

## 2023-04-24 DIAGNOSIS — K57.32 DIVERTICULITIS OF SIGMOID COLON: ICD-10-CM

## 2023-04-24 PROCEDURE — 1111F DSCHRG MED/CURRENT MED MERGE: CPT

## 2023-04-30 NOTE — PATIENT INSTRUCTIONS
You are seen in clinic today for postoperative follow-up. We are happy to hear that you are progressing well since surgery. As recommended by Dr. [place], we will need to continue with a low fiber diet for an additional 3 weeks and avoiding strenuous exercise for 4 weeks. We are checking blood tests to monitor your blood counts. You have mild anemia possibly from the surgery, chronic diverticulitis, we will check your blood counts and other postoperative test to make sure there remaining at goal  - Overall you are recovering well from your surgery  - Let us continue with good pain management:    Acetaminophen/Tylenol 325-650 mg every 4-6 hours as needed for mild pain  Norco only for severe episodes of pain. Please note that there is acetaminophen/Tylenol in [address] [place] [address],[address]  - The maximum amount of acetaminophen/Tylenol you may have in 24 hours is less than 4000 mg    Continue checking your blood pressures at home    Continue taking home Xarelto    Return to clinic in 4 months for follow-up.

## 2023-05-02 ENCOUNTER — OFFICE VISIT (OUTPATIENT)
Dept: INTERNAL MEDICINE CLINIC | Facility: CLINIC | Age: 68
End: 2023-05-02

## 2023-05-02 ENCOUNTER — LAB ENCOUNTER (OUTPATIENT)
Dept: LAB | Age: 68
End: 2023-05-02
Attending: INTERNAL MEDICINE
Payer: MEDICARE

## 2023-05-02 VITALS
DIASTOLIC BLOOD PRESSURE: 80 MMHG | WEIGHT: 161 LBS | BODY MASS INDEX: 27.49 KG/M2 | OXYGEN SATURATION: 97 % | HEART RATE: 80 BPM | SYSTOLIC BLOOD PRESSURE: 120 MMHG | HEIGHT: 64 IN

## 2023-05-02 DIAGNOSIS — Z98.890 POST-OPERATIVE STATE: Primary | ICD-10-CM

## 2023-05-02 DIAGNOSIS — R31.29 MICROSCOPIC HEMATURIA: ICD-10-CM

## 2023-05-02 DIAGNOSIS — Z98.890 POST-OPERATIVE STATE: ICD-10-CM

## 2023-05-02 DIAGNOSIS — E78.2 HYPERLIPIDEMIA, MIXED: ICD-10-CM

## 2023-05-02 DIAGNOSIS — D64.9 ANEMIA, UNSPECIFIED TYPE: ICD-10-CM

## 2023-05-02 DIAGNOSIS — F41.9 ANXIETY AND DEPRESSION: ICD-10-CM

## 2023-05-02 DIAGNOSIS — I87.1 MAY-THURNER SYNDROME: ICD-10-CM

## 2023-05-02 DIAGNOSIS — I10 HYPERTENSION, ESSENTIAL: ICD-10-CM

## 2023-05-02 DIAGNOSIS — F32.A ANXIETY AND DEPRESSION: ICD-10-CM

## 2023-05-02 DIAGNOSIS — E03.9 ACQUIRED HYPOTHYROIDISM: ICD-10-CM

## 2023-05-02 DIAGNOSIS — D64.9 MILD CHRONIC ANEMIA: ICD-10-CM

## 2023-05-02 LAB
ANION GAP SERPL CALC-SCNC: 6 MMOL/L (ref 0–18)
BASOPHILS # BLD AUTO: 0.03 X10(3) UL (ref 0–0.2)
BASOPHILS NFR BLD AUTO: 0.3 %
BUN BLD-MCNC: 13 MG/DL (ref 7–18)
BUN/CREAT SERPL: 13 (ref 10–20)
CALCIUM BLD-MCNC: 10.2 MG/DL (ref 8.5–10.1)
CHLORIDE SERPL-SCNC: 107 MMOL/L (ref 98–112)
CO2 SERPL-SCNC: 28 MMOL/L (ref 21–32)
CREAT BLD-MCNC: 1 MG/DL
DEPRECATED HBV CORE AB SER IA-ACNC: 108.3 NG/ML
DEPRECATED RDW RBC AUTO: 55.1 FL (ref 35.1–46.3)
EOSINOPHIL # BLD AUTO: 0.25 X10(3) UL (ref 0–0.7)
EOSINOPHIL NFR BLD AUTO: 2.8 %
ERYTHROCYTE [DISTWIDTH] IN BLOOD BY AUTOMATED COUNT: 17 % (ref 11–15)
FASTING STATUS PATIENT QL REPORTED: NO
GFR SERPLBLD BASED ON 1.73 SQ M-ARVRAT: 61 ML/MIN/1.73M2 (ref 60–?)
GLUCOSE BLD-MCNC: 96 MG/DL (ref 70–99)
HCT VFR BLD AUTO: 36.4 %
HGB BLD-MCNC: 10.7 G/DL
IMM GRANULOCYTES # BLD AUTO: 0.05 X10(3) UL (ref 0–1)
IMM GRANULOCYTES NFR BLD: 0.6 %
IRON SATN MFR SERPL: 14 %
IRON SERPL-MCNC: 49 UG/DL
LYMPHOCYTES # BLD AUTO: 1.32 X10(3) UL (ref 1–4)
LYMPHOCYTES NFR BLD AUTO: 14.6 %
MCH RBC QN AUTO: 26 PG (ref 26–34)
MCHC RBC AUTO-ENTMCNC: 29.4 G/DL (ref 31–37)
MCV RBC AUTO: 88.3 FL
MONOCYTES # BLD AUTO: 0.58 X10(3) UL (ref 0.1–1)
MONOCYTES NFR BLD AUTO: 6.4 %
NEUTROPHILS # BLD AUTO: 6.84 X10 (3) UL (ref 1.5–7.7)
NEUTROPHILS # BLD AUTO: 6.84 X10(3) UL (ref 1.5–7.7)
NEUTROPHILS NFR BLD AUTO: 75.3 %
OSMOLALITY SERPL CALC.SUM OF ELEC: 292 MOSM/KG (ref 275–295)
PLATELET # BLD AUTO: 513 10(3)UL (ref 150–450)
POTASSIUM SERPL-SCNC: 4.4 MMOL/L (ref 3.5–5.1)
RBC # BLD AUTO: 4.12 X10(6)UL
SODIUM SERPL-SCNC: 141 MMOL/L (ref 136–145)
TIBC SERPL-MCNC: 344 UG/DL (ref 240–450)
TRANSFERRIN SERPL-MCNC: 231 MG/DL (ref 200–360)
VIT B12 SERPL-MCNC: 342 PG/ML (ref 193–986)
WBC # BLD AUTO: 9.1 X10(3) UL (ref 4–11)

## 2023-05-02 PROCEDURE — 85025 COMPLETE CBC W/AUTO DIFF WBC: CPT

## 2023-05-02 PROCEDURE — 80048 BASIC METABOLIC PNL TOTAL CA: CPT

## 2023-05-02 PROCEDURE — 82728 ASSAY OF FERRITIN: CPT

## 2023-05-02 PROCEDURE — 82607 VITAMIN B-12: CPT

## 2023-05-02 PROCEDURE — 84466 ASSAY OF TRANSFERRIN: CPT

## 2023-05-02 PROCEDURE — 1111F DSCHRG MED/CURRENT MED MERGE: CPT | Performed by: INTERNAL MEDICINE

## 2023-05-02 PROCEDURE — 36415 COLL VENOUS BLD VENIPUNCTURE: CPT

## 2023-05-02 PROCEDURE — 83540 ASSAY OF IRON: CPT

## 2023-05-02 PROCEDURE — 99495 TRANSJ CARE MGMT MOD F2F 14D: CPT | Performed by: INTERNAL MEDICINE

## 2023-05-02 RX ORDER — HYDROCODONE BITARTRATE AND ACETAMINOPHEN 5; 325 MG/1; MG/1
1 TABLET ORAL EVERY 6 HOURS PRN
Qty: 30 TABLET | Refills: 0 | Status: SHIPPED | OUTPATIENT
Start: 2023-05-02

## 2023-05-03 ENCOUNTER — TELEPHONE (OUTPATIENT)
Dept: INTERNAL MEDICINE CLINIC | Facility: CLINIC | Age: 68
End: 2023-05-03

## 2023-05-03 NOTE — TELEPHONE ENCOUNTER
To  - when RN called patient she states she does NOT have hypothyroid which is on her after visit summary

## 2023-05-03 NOTE — TELEPHONE ENCOUNTER
Please notify the patient I reviewed her blood work from yesterday:    Her hemoglobin is coming up from 9.0 up to 10.7 since surgery. Also her iron levels are increasing, so no iron supplementation is warranted    No new recommendations for now, anticipate numbers will continue to improve as we get further out from surgery.   She is doing a good job in her recovery as we discussed

## 2023-06-29 RX ORDER — LISINOPRIL 40 MG/1
40 TABLET ORAL DAILY
Qty: 90 TABLET | Refills: 3 | Status: SHIPPED | OUTPATIENT
Start: 2023-06-29

## 2023-06-29 RX ORDER — ATENOLOL 50 MG/1
75 TABLET ORAL DAILY
Qty: 135 TABLET | Refills: 3 | Status: SHIPPED | OUTPATIENT
Start: 2023-06-29

## 2023-06-29 RX ORDER — SIMVASTATIN 10 MG
10 TABLET ORAL EVERY OTHER DAY
Qty: 45 TABLET | Refills: 3 | Status: SHIPPED | OUTPATIENT
Start: 2023-06-29

## 2023-06-29 NOTE — TELEPHONE ENCOUNTER
Christine Duran please advise on omeprazole, I believe this was stopped at discharge 4/19/21, although I do see it in Dr. Jose Purvis' notes 5/2/23    \"Hiatal hernia   -s/p lap. repair of paraesophageal hernia 2012. omeprazole 20 mg daily. OMEPRAZOLE 20 MG Oral Capsule Delayed Release (Discontinued) 90 capsule 0 3/31/2023 4/21/2023    Sig: TAKE 1 CAPSULE BY MOUTH BEFORE BREAKFAST EVERY DAY    Sent to pharmacy as: Omeprazole 20 MG Oral Capsule Delayed Release (PriLOSEC)    Reason for Discontinue: Stop Taking at Discharge    E-Prescribing Status: Receipt confirmed by pharmacy (3/31/2023  8:22 AM CDT)      This Order Has Been Discontinued    Order Status Reason By On   Discontinued Stop Taking at Discharge Corina Parsons MD 4/21/23 1536         Refill request is for a maintenance medication and has met the criteria specified in the Ambulatory Medication Refill Standing Order for eligibility, visits, laboratory, alerts and was sent to the requested pharmacy. Requested Prescriptions     Signed Prescriptions Disp Refills    simvastatin 10 MG Oral Tab 45 tablet 3     Sig: Take 1 tablet (10 mg total) by mouth every other day. Authorizing Provider: Tanvir Castellanos     Ordering User: Gab Bryan    atenolol 50 MG Oral Tab 135 tablet 3     Sig: Take 1.5 tablets (75 mg total) by mouth daily. Authorizing Provider: Tanvir Castellanos     Ordering User: Gab Bryan    lisinopril 40 MG Oral Tab 90 tablet 3     Sig: Take 1 tablet (40 mg total) by mouth daily.      Authorizing Provider: Tanvir Castellanos     Ordering User: Gab Bryan

## 2023-07-06 RX ORDER — OMEPRAZOLE 20 MG/1
20 CAPSULE, DELAYED RELEASE ORAL
Qty: 90 CAPSULE | Refills: 3 | Status: SHIPPED | OUTPATIENT
Start: 2023-07-06

## 2023-09-05 ENCOUNTER — TELEPHONE (OUTPATIENT)
Dept: INTERNAL MEDICINE CLINIC | Facility: CLINIC | Age: 68
End: 2023-09-05

## 2023-09-06 RX ORDER — ALPRAZOLAM 0.25 MG/1
TABLET ORAL
Qty: 30 TABLET | Refills: 2 | Status: SHIPPED | OUTPATIENT
Start: 2023-09-06

## 2023-09-06 NOTE — TELEPHONE ENCOUNTER
To MD:  The above refill request is for a controlled substance. Please review pended medication order. Print and sign for staff to fax to pharmacy or prescribe electronically.     Last office visit: 5/2/23  Last time refill sent and quantity/refills:     Dispensed Written Strength Quantity Refills Days Supply Provider Pharmacy    HYDROCODONE BITARTRATE-ACETAMINOPHE 05/02/2023 05/02/2023 325-5 mg 30 tablet  7 JANNY;BLANCA;[place];[id number]; [facility]

## 2023-11-26 ENCOUNTER — TELEPHONE (OUTPATIENT)
Dept: INTERNAL MEDICINE CLINIC | Facility: CLINIC | Age: 68
End: 2023-11-26

## 2023-11-26 DIAGNOSIS — Z13.0 SCREENING FOR DEFICIENCY ANEMIA: ICD-10-CM

## 2023-11-26 DIAGNOSIS — D64.9 ANEMIA, UNSPECIFIED TYPE: ICD-10-CM

## 2023-11-26 DIAGNOSIS — I10 HYPERTENSION, ESSENTIAL: Primary | ICD-10-CM

## 2023-11-26 NOTE — TELEPHONE ENCOUNTER
Did receive CT calcium score 10/19/2023: Total score of 0  No significant atherosclerotic plaque. Less than 5% chance of presence of coronary artery disease. Fragment meter right upper lobe pulmonary nodule, repeat CT scan recommended in 12 months. Medium size hiatal hernia. Bulky appearance of the esophagus. Direct visualization is recommended to evaluate for the presence of an underlying lesion.

## 2023-11-28 NOTE — TELEPHONE ENCOUNTER
I did compare her CT scan from 2017, no pulmonary nodule noted. Her CT calcium score at that time was 5. She does have an appointment with her surgeon coming up this Thursday given her history of breast cancer. Of note, she has a history of fundoplication in the past, likely the source of her bulky esophagus.

## 2023-12-02 ENCOUNTER — LAB ENCOUNTER (OUTPATIENT)
Dept: LAB | Age: 68
End: 2023-12-02
Attending: INTERNAL MEDICINE
Payer: MEDICARE

## 2023-12-02 DIAGNOSIS — I10 HYPERTENSION, ESSENTIAL: ICD-10-CM

## 2023-12-02 DIAGNOSIS — D64.9 ANEMIA, UNSPECIFIED TYPE: ICD-10-CM

## 2023-12-02 LAB
ALBUMIN SERPL-MCNC: 4.3 G/DL (ref 3.2–4.8)
ALBUMIN/GLOB SERPL: 1.4 {RATIO} (ref 1–2)
ALP LIVER SERPL-CCNC: 115 U/L
ALT SERPL-CCNC: 12 U/L
ANION GAP SERPL CALC-SCNC: 7 MMOL/L (ref 0–18)
AST SERPL-CCNC: 17 U/L (ref ?–34)
BASOPHILS # BLD AUTO: 0.04 X10(3) UL (ref 0–0.2)
BASOPHILS NFR BLD AUTO: 0.6 %
BILIRUB SERPL-MCNC: 0.4 MG/DL (ref 0.2–1.1)
BUN BLD-MCNC: 19 MG/DL (ref 9–23)
BUN/CREAT SERPL: 18.8 (ref 10–20)
CALCIUM BLD-MCNC: 10.1 MG/DL (ref 8.7–10.4)
CHLORIDE SERPL-SCNC: 104 MMOL/L (ref 98–112)
CO2 SERPL-SCNC: 27 MMOL/L (ref 21–32)
CREAT BLD-MCNC: 1.01 MG/DL
DEPRECATED HBV CORE AB SER IA-ACNC: 15.5 NG/ML
DEPRECATED RDW RBC AUTO: 47.4 FL (ref 35.1–46.3)
EGFRCR SERPLBLD CKD-EPI 2021: 60 ML/MIN/1.73M2 (ref 60–?)
EOSINOPHIL # BLD AUTO: 0.24 X10(3) UL (ref 0–0.7)
EOSINOPHIL NFR BLD AUTO: 3.7 %
ERYTHROCYTE [DISTWIDTH] IN BLOOD BY AUTOMATED COUNT: 13.9 % (ref 11–15)
FASTING STATUS PATIENT QL REPORTED: YES
GLOBULIN PLAS-MCNC: 3.1 G/DL (ref 2.8–4.4)
GLUCOSE BLD-MCNC: 82 MG/DL (ref 70–99)
HCT VFR BLD AUTO: 35.4 %
HGB BLD-MCNC: 11.1 G/DL
IMM GRANULOCYTES # BLD AUTO: 0.01 X10(3) UL (ref 0–1)
IMM GRANULOCYTES NFR BLD: 0.2 %
IRON SATN MFR SERPL: 23 %
IRON SERPL-MCNC: 85 UG/DL
LYMPHOCYTES # BLD AUTO: 1.22 X10(3) UL (ref 1–4)
LYMPHOCYTES NFR BLD AUTO: 18.7 %
MCH RBC QN AUTO: 28.9 PG (ref 26–34)
MCHC RBC AUTO-ENTMCNC: 31.4 G/DL (ref 31–37)
MCV RBC AUTO: 92.2 FL
MONOCYTES # BLD AUTO: 0.44 X10(3) UL (ref 0.1–1)
MONOCYTES NFR BLD AUTO: 6.7 %
NEUTROPHILS # BLD AUTO: 4.58 X10 (3) UL (ref 1.5–7.7)
NEUTROPHILS # BLD AUTO: 4.58 X10(3) UL (ref 1.5–7.7)
NEUTROPHILS NFR BLD AUTO: 70.1 %
OSMOLALITY SERPL CALC.SUM OF ELEC: 287 MOSM/KG (ref 275–295)
PLATELET # BLD AUTO: 273 10(3)UL (ref 150–450)
POTASSIUM SERPL-SCNC: 4.4 MMOL/L (ref 3.5–5.1)
PROT SERPL-MCNC: 7.4 G/DL (ref 5.7–8.2)
RBC # BLD AUTO: 3.84 X10(6)UL
SODIUM SERPL-SCNC: 138 MMOL/L (ref 136–145)
TIBC SERPL-MCNC: 375 UG/DL (ref 250–425)
TRANSFERRIN SERPL-MCNC: 252 MG/DL (ref 250–380)
WBC # BLD AUTO: 6.5 X10(3) UL (ref 4–11)

## 2023-12-02 PROCEDURE — 85025 COMPLETE CBC W/AUTO DIFF WBC: CPT

## 2023-12-02 PROCEDURE — 84466 ASSAY OF TRANSFERRIN: CPT

## 2023-12-02 PROCEDURE — 82728 ASSAY OF FERRITIN: CPT

## 2023-12-02 PROCEDURE — 83540 ASSAY OF IRON: CPT

## 2023-12-02 PROCEDURE — 80053 COMPREHEN METABOLIC PANEL: CPT

## 2023-12-02 PROCEDURE — 36415 COLL VENOUS BLD VENIPUNCTURE: CPT

## 2023-12-04 ENCOUNTER — TELEPHONE (OUTPATIENT)
Dept: INTERNAL MEDICINE CLINIC | Facility: CLINIC | Age: 68
End: 2023-12-04

## 2023-12-04 NOTE — TELEPHONE ENCOUNTER
Called patient and relayed DR. DOMINGUEZ message - surgery was in April - se is doing well and will call to schedule

## 2023-12-04 NOTE — TELEPHONE ENCOUNTER
Please notify the patient that her iron levels are on the low side, but her anemia is improving. Please get an update on her recent surgery follow-up visit.   We should do a follow-up within 4-6 weeks

## 2023-12-30 ENCOUNTER — TELEPHONE (OUTPATIENT)
Dept: INTERNAL MEDICINE CLINIC | Facility: CLINIC | Age: 68
End: 2023-12-30

## 2023-12-30 NOTE — TELEPHONE ENCOUNTER
Received correspondence from Dr. Eliezer Coates recurrent right chest wall pain which improved after nerve block. No evidence of recurrence of breast cancer. Follow-up in 1 year.

## 2024-01-17 ENCOUNTER — HOSPITAL ENCOUNTER (OUTPATIENT)
Age: 69
Discharge: HOME | End: 2024-01-17
Payer: MEDICARE

## 2024-01-17 VITALS
HEART RATE: 83 BPM | RESPIRATION RATE: 18 BRPM | SYSTOLIC BLOOD PRESSURE: 137 MMHG | DIASTOLIC BLOOD PRESSURE: 56 MMHG | TEMPERATURE: 97 F | OXYGEN SATURATION: 97 %

## 2024-01-17 DIAGNOSIS — J02.9 ACUTE PHARYNGITIS, UNSPECIFIED ETIOLOGY: Primary | ICD-10-CM

## 2024-01-17 DIAGNOSIS — R05.9 COUGH: ICD-10-CM

## 2024-01-17 LAB
S PYO AG THROAT QL: NEGATIVE
SARS-COV-2 RNA RESP QL NAA+PROBE: NOT DETECTED

## 2024-01-17 PROCEDURE — 87880 STREP A ASSAY W/OPTIC: CPT | Performed by: PHYSICIAN ASSISTANT

## 2024-01-17 PROCEDURE — U0002 COVID-19 LAB TEST NON-CDC: HCPCS | Performed by: PHYSICIAN ASSISTANT

## 2024-01-17 PROCEDURE — 99213 OFFICE O/P EST LOW 20 MIN: CPT | Performed by: PHYSICIAN ASSISTANT

## 2024-01-17 RX ORDER — BENZONATATE 100 MG/1
100 CAPSULE ORAL 3 TIMES DAILY PRN
Qty: 30 CAPSULE | Refills: 0 | Status: SHIPPED | OUTPATIENT
Start: 2024-01-17 | End: 2024-02-16

## 2024-01-17 RX ORDER — ACETAMINOPHEN 325 MG/1
650 TABLET ORAL
Qty: 40 TABLET | Refills: 0 | Status: SHIPPED | OUTPATIENT
Start: 2024-01-17

## 2024-01-17 NOTE — ED INITIAL ASSESSMENT (HPI)
Pt c/o cough and sore throat since 2 weeks ago but sore throat is getting worst since yesterday, denies fever

## 2024-01-17 NOTE — ED PROVIDER NOTES
Patient Seen in: [facility]    History     Chief Complaint   Patient presents with    Sore Throat     Stated Complaint: sore throat; headache    HPI    Krystal Carrillo is a 68 year old female presents with chief complaint of sore throat.  Onset 2 weeks ago, worsening yesterday.  Patient reports associated cough that has improved.  Patient states they are tolerating solid food and oral liquids.  Patient denies fever, chills, earache, trismus, drooling, neck pain, restricted neck movement, neck swelling, rash, dyspnea, wheeze, abdominal pain, nausea, vomiting, diarrhea, constipation.      Past Medical History:   Diagnosis Date    Anxiety     Chapman's esophagus     EGD 9/12 Dr. Manzanares-large HH, barretts with neg bx    BPV (benign positional vertigo) 2008    Breast cancer, left (HCC) 2013    L mastectomy/ implant (DCIS) Dr Elam    Breast cancer, right (HCC) 1989    R mastectomy/implant--chemotherapy     Colitis 2001    Degenerative joint disease (DJD) of hip 2018    MRI left hip 10/17/18-left hip mild joint narrowing and chondral thinning--Dr. Waqar Wong in [place]--    Diverticulitis     2022    Diverticulosis 1997    colonoscopy 1997-divertic and int hemorrhoids    DVT, bilateral lower limbs (HCC) age 18    DVT both LEs age 18 after had gb removed when on birth control pills.     Esophageal reflux     Esophagitis     Factor 5 Leiden mutation, heterozygous ([facility]) 07/2017    Factor V Leiden ([facility])     Gastritis     Hematuria 2017    gross hematuria on xarelto. 8/2017-CT urogram-ok. cysto, urine cytology neg (Dr Horan). Dr Ku 2017-not glomerular source; eval Dr Dede Anderson 5/2018-IVP,CT abd pelvis, [facility].    Hemorrhoids 2002    colonoscopy 2002    Hiatal hernia     High blood pressure     High cholesterol     History of gastroscopy 2004/2009/2012    EGD neg  2004; duod bx neg for sprue 2009    History of vein stripping     Hyperlipidemia     elevated cholesterol and  triglycerides    Hypertension, essential     Iron deficiency anemia 2004    endoscopy (-)    Left leg DVT (HCC) 2017    May-Thurner syndrome     L common iliac vein stent -Dr Aguiar    Right leg DVT (HCC) 2022    thrombectomy iliac vein clot at [facility] [place] [facility]    Screening for heart disease 2017    calcium heart score 5    Vertigo 2009       Past Surgical History:   Procedure Laterality Date    APPENDECTOMY      BIOPSY OF UTERUS LINING      endometrial bx-dysplasia    CHEMOTHERAPY Right     Dr. Basurto    CHOLECYSTECTOMY  1973    COLONOSCOPY      COLONOSCOPY      COLONOSCOPY  ,     COLONOSCOPY  10/2021    COLONOSCOPY N/A 2023    Procedure: COLONOSCOPY;  Surgeon: Luis Manzanares MD;  Location: [facility] ENDOSCOPY    EGD  ,     EGD N/A 2017    Procedure: ESOPHAGOGASTRODUODENOSCOPY, COLONOSCOPY, POSSIBLE BIOPSY, POSSIBLE POLYPECTOMY 59423, 33065;  Surgeon: Luis Manzanares MD;  Location: [facility]    EGD  10/2021    EGD N/A 10/22/2021    Procedure: ESOPHAGOGASTRODUODENOSCOPY,  with bxs COLONOSCOPY,;  Surgeon: Luis Manzanares MD;  Location: [facility]    HERNIA SURGERY      lap. paraesophageal hernia repair ; lap. partial fundoplicaton 270 degrees w paraesoph hernia repair; Dr Peña    IMPLANTABLE BREAST PROSTHESIS Right     IMPLANTABLE BREAST PROSTHESIS Left 2013    Dr Lunsford    MASTECTOMY LEFT  2013    Dr Lunsford    MASTECTOMY RIGHT      OTHER SURGICAL HISTORY      vein stripping    OTHER SURGICAL HISTORY Right     Knee thrombectomy    STENT PL SINGLE VES      L common iliac vein stent  for May Thurner syndrome. Dr Aguiar    T&A      UPPER GI ENDOSCOPY,DIAGNOSIS      duodenal biopsy neg for sprue    VEIN LIGATION AND STRIPPING Bilateral 1970s    bilateral leg veins stripped            Family History   Problem Relation Age of Onset    Other (sepsis) Father          age 68    Colon  Cancer Mother          age 41    Other (no siblings) Other     Other (1 son, 1 daughter) Other         allergies       Social History     Socioeconomic History    Marital status:    Tobacco Use    Smoking status: Never    Smokeless tobacco: Never   Vaping Use    Vaping Use: Never used   Substance and Sexual Activity    Alcohol use: Not Currently    Drug use: Never   Other Topics Concern    Caffeine Concern Yes     Comment: Coffee 3 cups daily     Social Determinants of Health     Financial Resource Strain: Low Risk  (2023)    Financial Resource Strain     Difficulty of Paying Living Expenses: Not very hard     Med Affordability: No   Transportation Needs: No Transportation Needs (2023)    Transportation Needs     Lack of Transportation: No       Review of Systems    Positive for stated complaint: sore throat; headache  Other systems are as noted in HPI.  Constitutional and vital signs reviewed.      All other systems reviewed and negative except as noted above.    PSFH elements reviewed from today and agreed except as otherwise stated in HPI.    Physical Exam     ED Triage Vitals [24 1714]   /56   Pulse 83   Resp 18   Temp 97.1 °F (36.2 °C)   Temp src Temporal   SpO2 97 %   O2 Device None (Room air)       Current:/56   Pulse 83   Temp 97.1 °F (36.2 °C) (Temporal)   Resp 18   SpO2 97%     PULSE OX within normal limits on room air as interpreted by this provider.     Constitutional: The patient is cooperative. Appears well-developed and well-nourished.  Mild discomfort.  Psychological: Alert, No abnormalities of mood, affect.  Head: Normocephalic/atraumatic.    Eyes: Pupils are equal round reactive to light.  Conjunctiva are within normal limits.  ENT: Pharynx injected.  Tonsils surgically absent bilaterally.  No exudates.  Uvula midline.  No trismus.  No drooling.  TMs within normal limits bilaterally.  Mucous membranes moist.  Neck: The neck is supple.  Nontender.  No  meningeal signs.  Chest: There is no tenderness to the chest wall.  No CVA tenderness bilaterally.  Respiratory: Respiratory effort was normal.  There is no rales, wheezes, or rhonchi.  There is no stridor.  Air entry is equal.  Cardiovascular: Regular rate and rhythm.  Capillary refill is brisk.    Genitourinary: Not examined.  Lymphatic: No gross lymphadenopathy noted.  Musculoskeletal: Musculoskeletal system is grossly intact.  There is no obvious deformity.  Neurological: Gross motor movement is intact in all 4 extremities.  Patient exhibits normal speech.  Skin: Skin is normal to inspection.  Warm and dry.  No obvious bruising.  No obvious rash.        ED Course     Labs Reviewed   POCT RAPID STREP - Normal   RAPID SARS-COV-2 BY PCR - Normal       MDM     Differential diagnosis prior to work-up including but not limited to strep pharyngitis, viral pharyngitis, URI, bronchitis, pneumonia    Physical exam remained stable over serial reexaminations as previously documented.  Results reviewed with patient.    I have given the patient instructions regarding their diagnoses, expectations, follow up, and ER precautions. I explained to the patient that emergent conditions may arise and to go to the ER for new, worsening or any persistent conditions. I've explained the importance of following up with their doctor as instructed. The patient verbalized understanding of the discharge instructions and plan.      Disposition and Plan     Clinical Impression:  1. Acute pharyngitis, unspecified etiology    2. Cough        Disposition:  Discharge    Follow-up:  Ronaldo Flores MD  [address] [place] [id number]  [phone]    Call in 1 day  For follow-up      Medications Prescribed:  Current Discharge Medication List        START taking these medications    Details   benzonatate 100 MG Oral Cap Take 1 capsule (100 mg total) by mouth 3 (three) times daily as needed for cough.  Qty: 30 capsule, Refills: 0      acetaminophen  325 MG Oral Tab Take 2 tablets (650 mg total) by mouth every 4 to 6 hours as needed for Pain or Fever.  Qty: 40 tablet, Refills: 0      phenol 1.4 % Mouth/Throat Liquid Use as directed 1 mL in the mouth or throat every 2 (two) hours as needed. For 5 days  Qty: 177 mL, Refills: 0

## 2024-01-26 ENCOUNTER — TELEPHONE (OUTPATIENT)
Dept: INTERNAL MEDICINE CLINIC | Facility: CLINIC | Age: 69
End: 2024-01-26

## 2024-01-26 NOTE — TELEPHONE ENCOUNTER
Dr Flores asked patient to schedule a follow up appointment in January regarding nodule on her lung which showed on CT scan  Patient did a follow up CT scan at [facility] about a month ago and the nodule was gone  Does patient still need to follow up with Dr Flores?  She scheduled her annual for 4/9/24, ok to wait until then?  Tasked to nursing

## 2024-01-26 NOTE — TELEPHONE ENCOUNTER
This over the patient as long she is feeling well from her urgent care visit on the 17th, we can certainly just wait until our appointment 4/9/2024.  Happy to hear that the repeat scan showed resolution of the nodule.  If she can get a copy of the report for our April appointment, we can uploaded to her chart.

## 2024-04-09 ENCOUNTER — OFFICE VISIT (OUTPATIENT)
Dept: INTERNAL MEDICINE CLINIC | Facility: CLINIC | Age: 69
End: 2024-04-09

## 2024-04-09 VITALS
BODY MASS INDEX: 32.95 KG/M2 | HEIGHT: 64 IN | WEIGHT: 193 LBS | DIASTOLIC BLOOD PRESSURE: 60 MMHG | HEART RATE: 82 BPM | OXYGEN SATURATION: 95 % | TEMPERATURE: 98 F | SYSTOLIC BLOOD PRESSURE: 124 MMHG

## 2024-04-09 DIAGNOSIS — F32.A ANXIETY AND DEPRESSION: ICD-10-CM

## 2024-04-09 DIAGNOSIS — Z13.29 SCREENING FOR THYROID DISORDER: ICD-10-CM

## 2024-04-09 DIAGNOSIS — E03.9 ACQUIRED HYPOTHYROIDISM: ICD-10-CM

## 2024-04-09 DIAGNOSIS — Z00.00 ENCOUNTER FOR ANNUAL HEALTH EXAMINATION: ICD-10-CM

## 2024-04-09 DIAGNOSIS — E78.2 HYPERLIPIDEMIA, MIXED: ICD-10-CM

## 2024-04-09 DIAGNOSIS — Z13.1 SCREENING FOR DIABETES MELLITUS: ICD-10-CM

## 2024-04-09 DIAGNOSIS — Z00.00 ANNUAL PHYSICAL EXAM: Primary | ICD-10-CM

## 2024-04-09 DIAGNOSIS — R31.29 MICROSCOPIC HEMATURIA: ICD-10-CM

## 2024-04-09 DIAGNOSIS — I87.1 MAY-THURNER SYNDROME: ICD-10-CM

## 2024-04-09 DIAGNOSIS — D64.9 MILD CHRONIC ANEMIA: ICD-10-CM

## 2024-04-09 DIAGNOSIS — I10 HYPERTENSION, ESSENTIAL: ICD-10-CM

## 2024-04-09 DIAGNOSIS — Z78.0 POSTMENOPAUSAL: ICD-10-CM

## 2024-04-09 DIAGNOSIS — F41.9 ANXIETY AND DEPRESSION: ICD-10-CM

## 2024-04-09 DIAGNOSIS — Z13.0 SCREENING FOR DEFICIENCY ANEMIA: ICD-10-CM

## 2024-04-09 DIAGNOSIS — E55.9 VITAMIN D DEFICIENCY: ICD-10-CM

## 2024-04-09 PROCEDURE — G0439 PPPS, SUBSEQ VISIT: HCPCS | Performed by: INTERNAL MEDICINE

## 2024-04-09 RX ORDER — ALPRAZOLAM 0.25 MG/1
TABLET ORAL
Qty: 30 TABLET | Refills: 2 | Status: SHIPPED | OUTPATIENT
Start: 2024-04-09

## 2024-04-09 RX ORDER — SPIRONOLACTONE 25 MG/1
25 TABLET ORAL DAILY
COMMUNITY
Start: 2024-03-12

## 2024-04-09 NOTE — PATIENT INSTRUCTIONS
- You were seen in clinic for regular annual check-up. We did review your most recent set of blood work with Dr. Aguiar.  Cholesterol levels remain well-controlled.  Sugars in the normal range.    Lets also focus on trying to control the blood pressure.  We seem to be on good medications recommended by Dr. Aguiar.  -We discussed conservative measures to decrease blood pressure over time:    -Targeted weight loss has been found to be the most effective way to decrease blood pressure over time  -Optimizing nutrition with incorporation of high-fiber foods such as fruits, vegetables, whole-grain sources of carbohydrates (brown rice/wheat bread).  A specific nutritional program is called the DASH diet  -Cardiovascular exercise at least 150 minutes of moderate intensity exercise per week  -Eliminating or minimizing alcohol  -Decreasing salt intake, caffeine intake  -Getting adequate sleep of at least 7-8 hours  -Managing stress and anxiety    For now, lets continue with trying to manage the nerve related pain.  Follow-up with the pain clinic as recommended  -If needed, you can consider over-the-counter medications Tylenol and ibuprofen as needed    -I have to hear that you have recovered since her surgery last year, monitor for any changes in bowel movements or pain symptoms.  - We may be due for DEXA scan to evaluate for osteoporosis  - Her next colonoscopy will be due 2028 -up-to-date at this time  -Please continue checking your blood pressures at home and notify us if they are increasing   -Vaccines you may be due for: COVID dose #5, Prevnar 20/pneumonia shot, We recommended checking with your insurance for coverage of Shingrix, a 2-dose shingles vaccine that can be obtained at the pharmacy if there is adequate coverage through your insurance plan.  - Please continue to eat a varied diet including recommended servings of vegetables, fruits, and low fat dairy. Minimize high saturated fats (such as fast foods)  and high sugar intake (such as soda)  - We recommend 150 minutes of moderate intensity exercise (brisk walking, swimming) weekly to maintain your current weight.  Targeted weight loss will require more vigorous exercise or more than 150 minutes/week.    Return to clinic in 6-12 months for follow-up    Krystal Carrillo's SCREENING SCHEDULE   Tests on this list are recommended by your physician but may not be covered, or covered at this frequency, by your insurer.   Please check with your insurance carrier before scheduling to verify coverage.   PREVENTATIVE SERVICES FREQUENCY &  COVERAGE DETAILS LAST COMPLETION DATE   Diabetes Screening    Fasting Blood Sugar /  Glucose    One screening every 12 months if never tested or if previously tested but not diagnosed with pre-diabetes   One screening every 6 months if diagnosed with pre-diabetes Lab Results   Component Value Date    GLU 82 12/02/2023        Cardiovascular Disease Screening    Lipid Panel  Cholesterol  Lipoprotein (HDL)  Triglycerides Covered every 5 years for all Medicare beneficiaries without apparent signs or symptoms of cardiovascular disease Lab Results   Component Value Date    CHOLEST 166 04/15/2023    HDL 42 04/15/2023    LDL 91 04/15/2023    TRIG 190 (H) 04/15/2023         Electrocardiogram (EKG)   Covered if needed at Welcome to Medicare, and non-screening if indicated for medical reasons 04/14/2023      Ultrasound Screening for Abdominal Aortic Aneurysm (AAA) Covered once in a lifetime for one of the following risk factors    Men who are 65-75 years old and have ever smoked    Anyone with a family history -     Colorectal Cancer Screening  Covered for ages 50-85; only need ONE of the following:    Colonoscopy   Covered every 10 years    Covered every 2 years if patient is at high risk or previous colonoscopy was abnormal 03/06/2023    Health Maintenance   Topic Date Due    Colorectal Cancer Screening  03/06/2028       Flexible Sigmoidoscopy    Covered every 4 years -    Fecal Occult Blood Test Covered annually -   Bone Density Screening    Bone density screening    Covered every 2 years after age 65 if diagnosed with risk of osteoporosis or estrogen deficiency.    Covered yearly for long-term glucocorticoid medication use (Steroids) No results found for this or any previous visit.      Health Maintenance Due   Topic Date Due    DEXA Scan  Never done      Pap and Pelvic    Pap   Covered every 2 years for women at normal risk; Annually if at high risk -  No recommendations at this time    Chlamydia Annually if high risk -  No recommendations at this time   Screening Mammogram    Mammogram     Recommend annually for all female patients aged 40 and older    One baseline mammogram covered for patients aged 35-39 -    No recommendations at this time    Immunizations    Influenza Covered once per flu season  Please get every year 10/27/2023  No recommendations at this time    Pneumococcal Each vaccine ([id number] & [id number]) covered once after 65 Prevnar 13: -    [id number]: -     Pneumococcal Vaccination(1 of 1 - PCV) Never done    Hepatitis B One screening covered for patients with certain risk factors   -  No recommendations at this time    Tetanus Toxoid Not covered by Medicare Part B unless medically necessary (cut with metal); may be covered with your pharmacy prescription benefits -    Tetanus, Diptheria and Pertusis TD and TDaP Not covered by Medicare Part B -  No recommendations at this time    Zoster Not covered by Medicare Part B; may be covered with your pharmacy  prescription benefits -  Zoster Vaccines(1 of 2) Never done     Annual Monitoring of Persistent Medications (ACE/ARB, digoxin diuretics, anticonvulsants)    Potassium Annually Lab Results   Component Value Date    K 4.4 12/02/2023         Creatinine   Annually Lab Results   Component Value Date    CREATSERUM 1.01 12/02/2023         BUN Annually Lab Results   Component Value Date    BUN  19 12/02/2023       Drug Serum Conc Annually No results found for: \"DIGOXIN\", \"DIG\", \"VALP\"

## 2024-04-09 NOTE — H&P
HPI:   Krystal Carrillo is a 68 year old female who presents for a Medicare Subsequent Annual Wellness visit (Pt already had Initial Annual Wellness).    Doing well overall. Will see Dr. Martinez of pain clinic. Nerve blocks stopped working for her. She has nerve pain from a prior surgery. It can radiate to the back.     Anxiety and stress are high for work. /80-90- not taking spironolactone. On lisinopril, atenolol.     Snoring, possibly.     I reviewed and updated the PMHx, FamHx, medications, allergies, and SocHx as below with the patient    OB/GYN  Last menstrual period: Postmenopausal    SocHX:  - Physical Activity: sedentary, 2 flights of stairs up and down.   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  - Hobbies: seeing family and grandkids.      Her last annual assessment has been over 1 year: Annual Physical due on 02/04/2023         Fall Risk Assessment: She has been screened for Falls and is low risk.    Cognitive Assessment: She had a completely normal cognitive assessment - see flowsheet entries     Functional Ability/Status: Krystal Carrillo has a completely normal functional assessment. See flowsheet for details.      Depression Screening (PHQ-2/PHQ-9): Over the LAST 2 WEEKS          Advanced Directive: She does NOT have a Living Will. [Do you have a living will?: No]  She does NOT have a Power of  for Health Care. [Do you have a healthcare power of ?: No]     Tobacco:  She has never smoked tobacco.    CAGE Alcohol Screen: CAGE screening score of 0 on 4/9/2024, showing low risk of alcohol abuse.      Patient Care Team: Patient Care Team:  Ronaldo Flores MD as PCP - General (Internal Medicine)  Luis Manzanares MD (GASTROENTEROLOGY)    Patient Active Problem List   Diagnosis    Hyperlipidemia, mixed    Hiatal hernia    Hypertension, essential    History of breast cancer    Anxiety and depression    Family history of colon cancer in mother    Deep vein thrombosis (DVT) of  femoral vein of left lower extremity (HCC)    Factor 5 Leiden mutation, heterozygous (HCC)    May-Thurner syndrome    DCIS (ductal carcinoma in situ)    Varicose veins    Iron deficiency anemia, unspecified iron deficiency anemia type    History of adenomatous polyp of colon    Mild chronic anemia    Acute deep vein thrombosis (DVT) of iliac vein of right lower extremity (HCC)    Diverticulitis of sigmoid colon    Sigmoid diverticulitis    Pre-op testing     Wt Readings from Last 3 Encounters:   04/09/24 193 lb (87.5 kg)   05/02/23 161 lb (73 kg)   04/19/23 164 lb (74.4 kg)      Last Cholesterol Labs:   Lab Results   Component Value Date    CHOLEST 166 04/15/2023    HDL 42 04/15/2023    LDL 91 04/15/2023    TRIG 190 (H) 04/15/2023          Last Chemistry Labs:   Lab Results   Component Value Date    AST 17 12/02/2023    ALT 12 12/02/2023    CA 10.1 12/02/2023    ALB 4.3 12/02/2023    TSH 3.030 04/15/2023    CREATSERUM 1.01 12/02/2023    GLU 82 12/02/2023        CBC  (most recent labs)   Lab Results   Component Value Date    WBC 6.5 12/02/2023    HGB 11.1 (L) 12/02/2023    .0 12/02/2023        ALLERGIES:   She is allergic to venofer [iron sucrose], adhesive tape, bacitracin, cephalexin, ciprofloxacin, levofloxacin, and psyllium.    CURRENT MEDICATIONS:   Outpatient Medications Marked as Taking for the 4/9/24 encounter (Office Visit) with Ronaldo Flores MD   Medication Sig    spironolactone 25 MG Oral Tab Take 1 tablet (25 mg total) by mouth daily.    acetaminophen 325 MG Oral Tab Take 2 tablets (650 mg total) by mouth every 4 to 6 hours as needed for Pain or Fever.    phenol 1.4 % Mouth/Throat Liquid Use as directed 1 mL in the mouth or throat every 2 (two) hours as needed. For 5 days    ALPRAZolam 0.25 MG Oral Tab Take one tablet up to once a day if needed for anxiety    omeprazole 20 MG Oral Capsule Delayed Release Take 1 capsule (20 mg total) by mouth every morning before breakfast.    simvastatin 10 MG  Oral Tab Take 1 tablet (10 mg total) by mouth every other day.    atenolol 50 MG Oral Tab Take 1.5 tablets (75 mg total) by mouth daily.    lisinopril 40 MG Oral Tab Take 1 tablet (40 mg total) by mouth daily.    rivaroxaban (XARELTO STARTER PACK) 15 & 20 MG Oral Tablet Therapy Pack Take 20 mg by mouth daily. Per Alexian Brothers doctor--15 mg bid for 20 days, then starting 11/27/22 take 20 mg daily.    Cholecalciferol (VITAMIN D3) 75 MCG (3000 UT) Oral Tab Take 1 tablet by mouth daily.      MEDICAL INFORMATION:   She  has a past medical history of Anxiety, Chapman's esophagus, BPV (benign positional vertigo) (2008), Breast cancer, left (HCC) (2013), Breast cancer, right (HCC) (1989), Colitis (2001), Degenerative joint disease (DJD) of hip (2018), Diverticulitis, Diverticulosis (1997), DVT, bilateral lower limbs ([facility]) (age 18), Esophageal reflux, Esophagitis, Factor 5 Leiden mutation, heterozygous ([facility]) (07/2017), Factor V Leiden ([facility]), Gastritis, Hematuria (2017), Hemorrhoids (2002), Hiatal hernia, High blood pressure, High cholesterol, History of gastroscopy (2004/2009/2012), History of vein stripping, Hyperlipidemia, Hypertension, essential (1993), Iron deficiency anemia (2004), Left leg DVT ([facility]) (07/2017), May-Thurner syndrome (2017), Right leg DVT ([facility]) (11/2022), Screening for heart disease (06/2017), and Vertigo (2009).    She  has a past surgical history that includes mastectomy right (1989); implantable breast prosthesis (Right, 1989); chemotherapy (Right, 1989); upper gi endoscopy,diagnosis (2009); biopsy of uterus lining; cholecystectomy (1973); appendectomy; t&a; mastectomy left (07/2013); implantable breast prosthesis (Left, 07/2013); hernia surgery (2012); colonoscopy (1997); colonoscopy (2002); colonoscopy (9/12, 7/17); other surgical history; egd (9/12, 7/17); vein ligation and stripping (Bilateral, 1970s); egd (N/A, 07/28/2017); stent pl single ves (2017); egd (10/2021); colonoscopy (10/2021); egd  (N/A, 10/22/2021); colonoscopy (N/A, 03/06/2023); and other surgical history (Right).    Her family history includes 1 son, 1 daughter in an other family member; Colon Cancer in her mother; no siblings in an other family member; sepsis in her father.   SOCIAL HISTORY:   She  reports that she has never smoked. She has never used smokeless tobacco. She reports that she does not currently use alcohol. She reports that she does not use drugs.     REVIEW OF SYSTEMS:   GENERAL: feels well otherwise  SKIN: denies any unusual skin lesions  EYES: denies blurred vision or double vision  HEENT: denies nasal congestion, sinus pain or ST  LUNGS: denies shortness of breath with exertion  CARDIOVASCULAR: denies chest pain on exertion  GI: denies abdominal pain, denies heartburn  : denies dysuria, vaginal discharge or itching, no complaint of urinary incontinence   MUSCULOSKELETAL: denies back pain  NEURO: denies headaches  PSYCHE: denies depression or anxiety  HEMATOLOGIC: denies hx of anemia  ENDOCRINE: denies thyroid history  ALL/ASTHMA: denies hx of allergy or asthma    EXAM:   /60   Pulse 82   Temp 97.9 °F (36.6 °C)   Ht 5' 4\" (1.626 m)   Wt 193 lb (87.5 kg)   SpO2 95%   BMI 33.13 kg/m²  Estimated body mass index is 33.13 kg/m² as calculated from the following:    Height as of this encounter: 5' 4\" (1.626 m).    Weight as of this encounter: 193 lb (87.5 kg).    Medicare Hearing Assessment  (Required for AWV/SWV)    Hearing Screening    Screening Method: Questionnaire  I have a problem hearing over the telephone: No I have trouble following the conversations when two or more people are talking at the same time: No   I have trouble understanding things on the TV: No I have to strain to understand conversations: No   I have to worry about missing the telephone ring or doorbell: No I have trouble hearing conversations in a noisy background such as a crowded room or restaurant: No   I get confused about where sounds  come from: No I misunderstand some words in a sentence and need to ask people to repeat themselves: No   I especially have trouble understanding the speech of women and children: No I have trouble understanding the speaker in a large room such as at a meeting or place of Protestant: No   Many people I talk to seem to mumble (or don't speak clearly): No People get annoyed because I misunderstand what they say: No   I misunderstand what others are saying and make inappropriate responses: No I avoid social activities because I cannot hear well and fear I will reply improperly: No   Family members and friends have told me they think I may have hearing loss: No                   Visual Acuity                           General Appearance:  Alert, cooperative, no distress, appears stated age   Head:  Normocephalic, without obvious abnormality, atraumatic   Eyes:  PERRL, conjunctiva/corneas clear, EOM's intact both eyes   Ears:  Normal TM's and external ear canals, both ears   Nose: Nares normal, septum midline,mucosa normal, no drainage or sinus tenderness   Throat: Lips, mucosa, and tongue normal; teeth and gums normal   Neck: Supple, symmetrical, trachea midline, no adenopathy;  thyroid: not enlarged, symmetric, no tenderness/mass/nodules; no carotid bruit or JVD   Back:   Symmetric, no curvature, ROM normal, no CVA tenderness   Lungs:   Clear to auscultation bilaterally, respirations unlabored   Heart:  Regular rate and rhythm, S1 and S2 normal, no murmur, rub, or gallop   Abdomen:   Soft, non-tender, bowel sounds active all four quadrants,  no masses, no organomegaly   Pelvic: Deferred   Extremities: Extremities normal, atraumatic, no cyanosis or edema   Pulses: 2+ and symmetric radial b/l   Skin: Skin color, texture, turgor normal, no rashes or lesions   Lymph nodes: Cervical, supraclavicular, and axillary nodes normal   Neurologic: Normal, CN II through XII intact, 5 out of 5 muscle strength throughout, 2+ DTRs patellar  tendons, normal gait       Well woman exam deferred    Vaccination History     Immunization History   Administered Date(s) Administered    Covid-19 Vaccine Pfizer 30 mcg/0.3 ml 12/29/2020, 02/08/2021, 08/01/2021, 10/11/2021    FLU VAC High Dose 65 YRS & Older PRSV Free (15047) 10/04/2019, 10/15/2022, 10/27/2023    FLULAVAL 6 months & older 0.5 ml Prefilled syringe (62294) 10/10/2018    Flucelvax 0.5ml Vaccine 10/27/2023    Influenza 10/15/2020, 10/04/2021    TDAP 01/08/2020        ASSESSMENT AND OTHER RELEVANT CHRONIC CONDITIONS:   Krystal Carrillo is a 68 year old female who presents for a Medicare Assessment.     EKG 4/13/2023  Rate: 72 Rhythm: Sinus  Axis: Left axis deviation   Intervals: FL: 164  QRS: 80  QT: 366/ QTc 400    ST Abnormalities: None    Impression: Normal sinus rhythm with left axis deviation  Comparison: No changes since last EKG 7/21/2017      MRI abdomen liver with and without IV contrast 10/14/2022  Impression   CONCLUSION:   1. Moderate hepatic steatosis with more pronounced focal fatty infiltration accounting for the 2 hypoattenuating lesions which were seen on CT scan.  No suspicious hepatic lesion.   2. Moderate to large hiatal hernia with apparent thickening of folds related to the hernia probably related to underdistention near the level of the diaphragm.  Less likely gastritis .   3. Gastroesophageal reflux likely accounting for fluid in distal esophagus.   4. Colonic diverticulosis.   5. Left common iliac vein wall stent.          MRI enterography abdomen and pelvis 10/14/2022  Impression   CONCLUSION:   1.  Enterography of the small bowel is grossly unremarkable.   2.  There is left-sided diverticulosis.  There are findings suggesting acute or chronic diverticulitis involving the proximal and mid sigmoid colon, and the greatest inflammatory changes are associated with a 12 cm segment of colonic inflammation   involving the mid sigmoid colon.  In this region there is luminal narrowing,  colonic enhancement and surrounding fat stranding. While localized colitis/diverticulitis in this region is suspected, a mass lesion could also give this appearance.   No   upstream bowel dilatation.  There is a 6 cm tubular shaped band of gas along the superior margin of the mid sigmoid colon which may represent a site of previous contained perforation.  No surrounding lymphadenopathy.   3.  Mild diffuse hepatic steatosis.  In the inferior aspect of segment 5 there is a 13 mm diameter hypoenhancing focus which loses signal on opposed-phase imaging, suggesting it represents more advanced focal fatty infiltration, however this finding is   otherwise suboptimally assessed on this exam due to enterography technique.  A separate hypodense focus was present on recent outside institution CT, also in the same hepatic segment and the 2nd focus is not clearly demonstrated on this exam.  Correlate   with MRI abdomen with without contrast performed immediately prior to this exam and dictated separately.   4.  Post cholecystectomy.  There is intrahepatic biliary ductal dilatation and borderline common bile duct dilatation.  These findings likely represent age related and post cholecystectomy changes however correlate for biliary lab abnormality.  MRCP was   not performed.  Pancreatic divisum is present.   5.  T2 bright foci within the kidneys compatible with simple cysts however a few foci are too small to definitively characterize.  In the lower pole of the right kidney there is also a 7 mm diameter cortical based focus which has characteristics most   compatible with a hemorrhagic/proteinaceous cyst however it is too small to definitively characterize.  Differential for this latter finding includes renal neoplasm, and recommend surveillance imaging in 12 months with MRI.   6.  Moderate-sized hiatal hernia.        Colonoscopy 3/6/2023  Final Diagnosis:      Sigmoid colon; biopsy:  Fragments of colonic mucosa with mild lamina  propria chronic inflammation.  No evidence of microscopic colitis, significant glandular distortion, acute cryptitis, granuloma, dysplasia, or malignancy is identified.     Surgical pathology 4/19/2023  Final Diagnosis:      Sigmoid colon and colonic donuts/rings (2); laparoscopic sigmoid colectomy:   Cylindrical portion of colonic tissue demonstrating diffuse diverticulosis and prominent region of severe acute and chronic diverticulitis, associated areas of congestive neovascularization with granulation tissue formation, prominent surrounding fibrous adhesions extending into the pericolic fibroadipose tissues and accompanying luminal stricture.   No evidence of tissue-invasive fungal organisms, parasitic organisms, viral inclusions, caseating epithelioid granulomas, active/acute colitis, increased intraepithelial lymphocytes, polyps, epithelial dysplasia or malignancy identified.   Proximal and distal colonic specimen end margins demonstrating focal mild perivascular acute inflammatory infiltrates within the adventitial soft tissues.  Separate colonic anastomotic rings/donuts (2) showing no significant pathologic abnormalities.   Five regional lymph nodes showing no evidence of tissue-invasive fungal organisms, parasitic organisms, viral inclusions, epithelioid granulomas, or primary or metastatic malignant neoplastic infiltrates.        PLAN SUMMARY:   Diagnoses and all orders for this visit:    Annual physical exam    Hypertension, essential    Screening for deficiency anemia    Mild chronic anemia    Hyperlipidemia, mixed    May-Thurner syndrome    Anxiety and depression    Microscopic hematuria    Acquired hypothyroidism    Vitamin D deficiency    Postmenopausal    Screening for thyroid disorder    Screening for diabetes mellitus    Encounter for annual health examination      Right-sided neuropathic pain  History of implants after mastectomy.  Since then, has had chronic intermittent episodes of vibrating  neuropathic type pain with radiation towards the middle chest.  - Follows with her surgeon, typically received nerve blocks with improvement of symptoms.  This time around, it has been refractory  - Not requiring any over-the-counter medications for now  - Advised to follow with pain clinic for further evaluation    Diverticulitis of sigmoid colon  treated w oral abx by Dr Manzanares and Dr Peña based on CT a/p MRI enterography in 10/2022.  Recent colonoscopy by Dr. Manzanares 3/6/2022 which showed extensive diverticulosis with marked narrowing and edema.  Biopsy showed chronic diverticulitis with stricture of the sigmoid colon, with concern for pericolonic inflammation and luminal narrowing without perforation abscess or colovesicular fistula.  -Surgical pathology 4/19: Diffuse diverticulosis, prominent region of severe acute and chronic diverticulitis without evidence of infection/active-acute colitis/malignancy.  Proximal distal colonic specimen with focal mild perivascular acute inflammatory infiltrates  -Did see surgery, Dr. Peña  5/25/2023, is overall recovered from surgery       Hypertension  Blood pressure remains well controlled  - Continue checking at home  - atenolol 50 mg 1.5 tabs daily and lisinopril 40 mg daily.     -We discussed conservative measures to decrease blood pressure over time:    -Targeted weight loss has been found to be the most effective way to decrease blood pressure over time  -Optimizing nutrition with incorporation of high-fiber foods such as fruits, vegetables, whole-grain sources of carbohydrates (brown rice/wheat bread).  A specific nutritional program is called the DASH diet  -Cardiovascular exercise at least 150 minutes of moderate intensity exercise per week  -Eliminating or minimizing alcohol  -Decreasing salt intake, caffeine intake  -Getting adequate sleep of at least 7-8 hours  -Managing stress and anxiety      Anxiety and Depression  - venlafaxine ER 37.5 mg daily -- no  longer taking  - Has xanax 0.25 mg if needed. 1x     Anemia, mild, chronic-  EGD/colonoscopy 10/22/21 Dr Manzanares--HH, benign gastric polyps, diverticulosis. (hx bret colon polyps 2017).  Fe one daily.  Pt doesn't want capsule.   -Did have recent acute blood loss anemia that did not require transfusion.  This was due to thrombectomy procedure in which hemoglobin was 7.6 on 11/6/2022  -Hgb 10.3 preop, stable with degree of iron deficiency anemia  - Hemoglobin today 9.0- stable  We will trend  - IV Venofer 200 mg while inpatient-experienced a allergic reaction, developed rash of the chest.  Zyrtec was added for antihistamine relief.  Venofer added to allergy list with rash  -Repeat CBC     Hyperlipemia, mixed  -simvastatin 10 mg QOD.  LDL 91, triglycerides 190  - Plan to repeat fasting lipid panel     May Thurner syndr  -L common iliac vein stent placed 2017 Dr. Aguira (cardiology vascular) at [facility]. He rx xarelto 10 mg daily.  Hx DVT L leg--chronic --found 7/20/17 at [facility] vein clinic.  -Prior to surgery: Xarelto was held for 3 days.  Took Lovenox 1 mg/kg/16, 4/17.  Held anticoagulation 4/18 and morning of procedure  - Resumed on Xarelto     Factor 5 Leiden hetereozygous  -saw hematologist Dr Danilo Ceron 2017. on xarelto.   -We will need bridging as below  - Did have a recent acute DVT of femoral vein right lower extremity and iliac vein of right lower extremity which required thrombectomy at [facility] 11/2022  - Anticoagulation management as above     Vit D deficiency  -on Vit D 3000 units daily.. (pt changed from 5000 u 3x a wk in 2020). vitamin D level nl on 10/31/20 at gyn.  - Vitamin D 51.4, will repeat level     Hiatal hernia   -s/p lap. repair of paraesophageal hernia 2012. omeprazole 20 mg daily.      Hx Breast cancer   -S/p bilateral mastectomies. Post implants bilat.  has seen Dr. Elam at [place].      L hip DJD  -Dr. Waqar Wong in [place]--MRI left hip 10/17/18-left hip mild  joint narrowing and chondral thinning     Hx hematuria eval 2017 and 2018  Dr. Anderson urologist at [facility]--> IVP,  CT abd pelvis w wo 5/31/18 [facility]-neg. Pt notes cysto 6/2018 ok.   CT urogram 8/10/17 and cystoscopy, urine cytology 8/2017 Dr. Horan- negative.  Dr Ku 9/2017- lab--neg for gomerular source, vasculitis, etc. UA done per gyn.  - Preoperative urinalysis: Negative blood, 0-2 RBC    HTN Screen: At goal  DM Screen: Check fasting sugar  HLD Screen: Check fasting lipid panel  Osteoporosis Screen (>65 or < 65 with FRAX > 9.3%): Due for DEXA scan  HCV Screen: Considered low risk  HIV Screen: considered low risk  G/C/Syphilis: Considered low risk    Colon Cancer Screening (45-70): Last colonoscopy 3/6/2023, due 2028  Breast Cancer Screening (40-70): No longer indicated, history of bilateral mastectomy  Cervical Cancer Screening (21-64): No longer indicated  Lung Cancer Screening (55-79 with 30 p/year and active < 15 years): Not indicated    Influenza: Annually  Td/Tdap: Last Tdap 2020, due 2030  Zoster (50+): Previously declined  HPV (19-26): Not indicated  Pneumococcal: Due, previously declined    Immunization History   Administered Date(s) Administered    Covid-19 Vaccine Pfizer 30 mcg/0.3 ml 12/29/2020, 02/08/2021, 08/01/2021, 10/11/2021    FLU VAC High Dose 65 YRS & Older PRSV Free (66008) 10/04/2019, 10/15/2022, 10/27/2023    FLULAVAL 6 months & older 0.5 ml Prefilled syringe ([id number]) 10/10/2018    Flucelvax 0.5ml Vaccine 10/27/2023    Influenza 10/15/2020, 10/04/2021    TDAP 01/08/2020         Diet assessment: good     PLAN:  The patient indicates understanding of these issues and agrees to the plan.  Reinforced healthy diet, lifestyle, and exercise.    No follow-ups on file.     Ronaldo Flores MD, 4/12/2023     General Health     In the past six months, have you lost more than 10 pounds without trying?: 2 - No  Has your appetite been poor?: No  How does the patient maintain a good energy level?: Other  How  would you describe your daily physical activity?: Light  How would you describe your current health state?: Good  How do you maintain positive mental well-being?: Visiting Family;Visiting Friends      This section provided for quick review of chart, separate sheet to patient  PREVENTATIVE SERVICES  INDICATIONS AND SCHEDULE Internal Lab or Procedure External Lab or Procedure   Diabetes Screening      HbgA1C   Annually No results found for: \"A1C\"      No data to display                Fasting Blood Sugar (FSB)Annually Glucose (mg/dL)   Date Value   12/02/2023 82     GLUCOSE (mg/dL)   Date Value   04/01/2011 93          Cardiovascular Disease Screening     LDL Annually LDL Cholesterol (mg/dL)   Date Value   04/15/2023 91     LDL-CHOLESTEROL (mg/dL (calc))   Date Value   04/01/2011 90        EKG - w/ Initial Preventative Physical Exam only, or if medically necessary Electrocardiogram date       Colorectal Cancer Screening      Colonoscopy Screen every 10 years Health Maintenance   Topic Date Due    Colorectal Cancer Screening  03/06/2028    Update Health Maintenance if applicable    Flex Sigmoidoscopy Screen every 10 years No results found for this or any previous visit.      No data to display                 Fecal Occult Blood Annually No results found for: \"FOB\"      No data to display                Glaucoma Screening      Ophthalmology Visit Annually: Diabetics, FHx Glaucoma, AA>50, > 65      No data to display                Bone Density Screening      Dexascan Every two years No results found for this or any previous visit.        No data to display                Pap and Pelvic      Pap: Every 3 yrs age 21-65 or Pap+HPV every 5 yrs age 30-65, age 65 and older at high risk No recommendations at this time Update Health Maintenance if applicable    Chlamydia  Annually if high risk No results found for: \"CHLAMYDIA\"      No data to display                Screening Mammogram      Mammogram Annually to 75, then  as discussed No recommendations at this time Update Health Maintenance if applicable     Immunizations (Update Immunization Activity if applicable)     Influenza  Covered Annually 10/27/2023 Please get every year    Pneumococcal 13 (Prevnar)  Covered Once after 65 - Please get once after your 65th birthday    Pneumococcal 23 (Pneumovax)  Covered Once after 65 - Please get once after your 65th birthday    Hepatitis B for Moderate/High Risk - Medium/high risk factors:   End-stage renal disease   Hemophiliacs who received Factor VIII or IX concentrates   Clients of institutions for the mentally retarded   Persons who live in the same house as a HepB virus carrier   Homosexual men   Illicit injectable drug abusers     Tetanus Toxoid  Only covered with a cut with metal- TD and TDaP Not covered by Medicare Part B - This may be covered with your prescription benefits, but Medicare does not cover unless Medically needed    Zoster  Not covered by Medicare Part B - This may be covered with your pharmacy  prescription benefits      SPECIFIC DISEASE MONITORING Internal Lab or Procedure External Lab or Procedure        Annual Monitoring of Persistent Medications (ACE/ARB, digoxin diuretics, anticonvulsants)    Potassium Annually Lab Results   Component Value Date    K 4.4 12/02/2023         Creatinine   Annually Lab Results   Component Value Date    CREATSERUM 1.01 12/02/2023         BUN Annually Lab Results   Component Value Date    BUN 19 12/02/2023       Drug Serum Conc Annually No results found for: \"DIGOXIN\", \"DIG\", \"VALP\"

## 2024-06-19 ENCOUNTER — OFFICE VISIT (OUTPATIENT)
Dept: INTERNAL MEDICINE CLINIC | Facility: CLINIC | Age: 69
End: 2024-06-19

## 2024-06-19 VITALS
BODY MASS INDEX: 32.01 KG/M2 | DIASTOLIC BLOOD PRESSURE: 82 MMHG | HEART RATE: 65 BPM | HEIGHT: 64 IN | WEIGHT: 187.5 LBS | OXYGEN SATURATION: 96 % | SYSTOLIC BLOOD PRESSURE: 124 MMHG

## 2024-06-19 DIAGNOSIS — R21 RASH AND NONSPECIFIC SKIN ERUPTION: Primary | ICD-10-CM

## 2024-06-19 PROCEDURE — 99214 OFFICE O/P EST MOD 30 MIN: CPT | Performed by: INTERNAL MEDICINE

## 2024-06-19 NOTE — PROGRESS NOTES
Krystal Carrillo is a 68 year old female.  Chief Complaint   Patient presents with    Derm Problem     HPI:   Krystal Carrillo is a 68 year old female who presents for a rash.    Started using Lancome face cream about a month ago, had no issues.     Yesterday she woke up and took a shower. She didn't look at her face before showering. After she got out of the shower, she used a new Rickey cleansing pad that was delivered yesterday. The seal on the packaging of the cleansing pad was broken. After using the cleansing pad on her face she noticed she broke out into a rash on her forehead and cheeks. Denies previous rash in this area. Admits to occasional itching.    Denies f/c, taking new medications, eating unusual foods, making changes to face wash, lotion, soaps, detergents.     Wt Readings from Last 6 Encounters:   06/19/24 187 lb 8 oz (85 kg)   04/09/24 193 lb (87.5 kg)   05/02/23 161 lb (73 kg)   04/19/23 164 lb (74.4 kg)   04/13/23 168 lb (76.2 kg)   03/06/23 165 lb (74.8 kg)     Body mass index is 32.18 kg/m².   Current Outpatient Medications   Medication Sig Dispense Refill    spironolactone 25 MG Oral Tab Take 1 tablet (25 mg total) by mouth daily.      ALPRAZolam 0.25 MG Oral Tab Take one tablet up to once a day if needed for anxiety 30 tablet 2    acetaminophen 325 MG Oral Tab Take 2 tablets (650 mg total) by mouth every 4 to 6 hours as needed for Pain or Fever. 40 tablet 0    phenol 1.4 % Mouth/Throat Liquid Use as directed 1 mL in the mouth or throat every 2 (two) hours as needed. For 5 days 177 mL 0    omeprazole 20 MG Oral Capsule Delayed Release Take 1 capsule (20 mg total) by mouth every morning before breakfast. 90 capsule 3    simvastatin 10 MG Oral Tab Take 1 tablet (10 mg total) by mouth every other day. 45 tablet 3    atenolol 50 MG Oral Tab Take 1.5 tablets (75 mg total) by mouth daily. 135 tablet 3    lisinopril 40 MG Oral Tab Take 1 tablet (40 mg total) by mouth daily. 90 tablet 3    rivaroxaban  (XARELTO STARTER PACK) 15 & 20 MG Oral Tablet Therapy Pack Take 20 mg by mouth daily. Per [facility] doctor--15 mg bid for 20 days, then starting 11/27/22 take 20 mg daily.  0    Cholecalciferol (VITAMIN D3) 75 MCG (3000 UT) Oral Tab Take 1 tablet by mouth daily.        Past Medical History:    Anxiety    Chapman's esophagus    EGD 9/12 Dr. Manzanares-large HH, barretts with neg bx    BPV (benign positional vertigo)    Breast cancer, left (HCC)    L mastectomy/ implant (DCIS) Dr Elam    Breast cancer, right (HCC)    R mastectomy/implant--chemotherapy     Colitis    Degenerative joint disease (DJD) of hip    MRI left hip 10/17/18-left hip mild joint narrowing and chondral thinning--Dr. Waqar Wong in [place]--    Diverticulitis    2022    Diverticulosis    colonoscopy 1997-divertic and int hemorrhoids    DVT, bilateral lower limbs (HCC)    DVT both LEs age 18 after had gb removed when on birth control pills.     Esophageal reflux    Esophagitis    Factor 5 Leiden mutation, heterozygous (HCC)    Factor V Leiden (HCC)    Gastritis    Hematuria    gross hematuria on xarelto. 8/2017-CT urogram-ok. cysto, urine cytology neg (Dr Horan). Dr Ku 2017-not glomerular source; eval Dr Dede Anderson 5/2018-IVP,CT abd pelvis, cysto ok at [facility].    Hemorrhoids    colonoscopy 2002    Hiatal hernia    High blood pressure    High cholesterol    History of gastroscopy    EGD neg  2004; duod bx neg for sprue 2009    History of vein stripping    Hyperlipidemia    elevated cholesterol and triglycerides    Hypertension, essential    Iron deficiency anemia    endoscopy (-)    Left leg DVT (HCC)    May-Thurner syndrome    L common iliac vein stent 2017-Dr Aguiar    Right leg DVT (HCC)    thrombectomy iliac vein clot at [facility]    Screening for heart disease    calcium heart score 5    Vertigo      Past Surgical History:   Procedure Laterality Date    Appendectomy      Biopsy of uterus lining      endometrial  bx-dysplasia    Chemotherapy Right     Dr. Basurto    Cholecystectomy  1973    Colonoscopy      Colonoscopy      Colonoscopy  ,     Colonoscopy  10/2021    Colonoscopy N/A 2023    Procedure: COLONOSCOPY;  Surgeon: Luis Manzanares MD;  Location: [facility] ENDOSCOPY    Egd  ,     Egd N/A 2017    Procedure: ESOPHAGOGASTRODUODENOSCOPY, COLONOSCOPY, POSSIBLE BIOPSY, POSSIBLE POLYPECTOMY 00445, 38421;  Surgeon: Luis Manzanares MD;  Location: [facility]    Egd  10/2021    Egd N/A 10/22/2021    Procedure: ESOPHAGOGASTRODUODENOSCOPY,  with bxs COLONOSCOPY,;  Surgeon: Luis Manzanares MD;  Location: [facility]    Hernia surgery      lap. paraesophageal hernia repair ; lap. partial fundoplicaton 270 degrees w paraesoph hernia repair; Dr Peña    Implantable breast prosthesis Right     Implantable breast prosthesis Left 2013    Dr Lunsford    Mastectomy left  2013    Dr Lunsford    Mastectomy right      Other surgical history      vein stripping    Other surgical history Right     Knee thrombectomy    Stent pl single ves      L common iliac vein stent  for May Thurner syndrome. Dr Aguiar    T&a      Upper gi endoscopy,diagnosis      duodenal biopsy neg for sprue    Vein ligation and stripping Bilateral 1970s    bilateral leg veins stripped      Family History   Problem Relation Age of Onset    Other (sepsis) Father          age 68    Colon Cancer Mother          age 41    Other (no siblings) Other     Other (1 son, 1 daughter) Other         allergies      Social History:   Social History     Socioeconomic History    Marital status:    Tobacco Use    Smoking status: Never    Smokeless tobacco: Never   Vaping Use    Vaping status: Never Used   Substance and Sexual Activity    Alcohol use: Not Currently    Drug use: Never   Other Topics Concern    Caffeine Concern Yes     Comment: Coffee 3 cups daily     Social  Determinants of Health     Financial Resource Strain: Low Risk  (4/24/2023)    Financial Resource Strain     Difficulty of Paying Living Expenses: Not very hard     Med Affordability: No   Transportation Needs: No Transportation Needs (4/24/2023)    Transportation Needs     Lack of Transportation: No          REVIEW OF SYSTEMS:   GENERAL: feels well otherwise, denies f/c  SKIN: + rash    EXAM:   /82   Pulse 65   Ht 5' 4\" (1.626 m)   Wt 187 lb 8 oz (85 kg)   SpO2 96%   BMI 32.18 kg/m²     GENERAL: well developed, well nourished, in no apparent distress  SKIN: + erythematous maculopapular rash across forehead and scattered along cheeks  NEURO: A&O x 3, moves all 4 extremities spontaneously      ASSESSMENT AND PLAN:   Krystal Carrillo is a 68 year old female who presents for a rash.    Rash and nonspecific skin eruption  - advised patient discard cleansing pads and cream  - advised antihistamine zyrtec and hydrocortisone cream PRN  - advised f/u derm (Dr. Concetta Claire) if no improvement    RTC as previously scheduled with PCP or sooner PRN.    For E/M code - 30 minutes spent reviewing performing chart review, obtaining a history, performing a physical exam, reviewing the assessment/plan, placing orders, and completing documentation.     Shawna Matute DO  6/19/2024  1:45 PM

## 2024-06-29 ENCOUNTER — TELEPHONE (OUTPATIENT)
Dept: INTERNAL MEDICINE CLINIC | Facility: CLINIC | Age: 69
End: 2024-06-29

## 2024-06-29 NOTE — TELEPHONE ENCOUNTER
Received records from Dr. Hernan Aguiar cardiology 3/11/2024  - Continued on Xarelto  - Started on spironolactone 12.5 mg.  - Precordial chest pain, noncardiac.

## 2024-08-13 RX ORDER — OMEPRAZOLE 20 MG/1
20 CAPSULE, DELAYED RELEASE ORAL
Qty: 90 CAPSULE | Refills: 3 | Status: SHIPPED | OUTPATIENT
Start: 2024-08-13

## 2024-08-13 RX ORDER — ATENOLOL 50 MG/1
75 TABLET ORAL DAILY
Qty: 135 TABLET | Refills: 3 | Status: SHIPPED | OUTPATIENT
Start: 2024-08-13

## 2024-08-13 RX ORDER — LISINOPRIL 40 MG/1
40 TABLET ORAL DAILY
Qty: 90 TABLET | Refills: 3 | Status: SHIPPED | OUTPATIENT
Start: 2024-08-13

## 2024-08-13 NOTE — TELEPHONE ENCOUNTER
Refill request is for a maintenance medication and has met the criteria specified in the Ambulatory Medication Refill Standing Order for eligibility, visits, laboratory, alerts and was sent to the requested pharmacy.    Requested Prescriptions     Signed Prescriptions Disp Refills    lisinopril 40 MG Oral Tab 90 tablet 3     Sig: Take 1 tablet (40 mg total) by mouth daily.     Authorizing Provider: JANNY LOPEZ     Ordering User: OSITO GIORDANO    atenolol 50 MG Oral Tab 135 tablet 3     Sig: Take 1.5 tablets (75 mg total) by mouth daily.     Authorizing Provider: JANNY LOPEZ     Ordering User: OSITO GIORDANO    omeprazole 20 MG Oral Capsule Delayed Release 90 capsule 3     Sig: Take 1 capsule (20 mg total) by mouth before breakfast.     Authorizing Provider: JANNY LOPEZ     Ordering User: OSITO GIORDANO

## 2024-09-05 ENCOUNTER — HOSPITAL ENCOUNTER (OUTPATIENT)
Age: 69
Discharge: HOME | End: 2024-09-05
Payer: MEDICARE

## 2024-09-05 VITALS
RESPIRATION RATE: 19 BRPM | TEMPERATURE: 98 F | SYSTOLIC BLOOD PRESSURE: 146 MMHG | HEART RATE: 65 BPM | DIASTOLIC BLOOD PRESSURE: 58 MMHG | OXYGEN SATURATION: 97 %

## 2024-09-05 DIAGNOSIS — R21 RASH: Primary | ICD-10-CM

## 2024-09-05 PROCEDURE — 99213 OFFICE O/P EST LOW 20 MIN: CPT | Performed by: NURSE PRACTITIONER

## 2024-09-05 RX ORDER — TRIAMCINOLONE ACETONIDE 1 MG/G
CREAM TOPICAL 2 TIMES DAILY
Qty: 45 G | Refills: 0 | Status: SHIPPED | OUTPATIENT
Start: 2024-09-05 | End: 2024-09-12

## 2024-09-05 RX ORDER — VALACYCLOVIR HYDROCHLORIDE 1 G/1
1000 TABLET, FILM COATED ORAL 3 TIMES DAILY
Qty: 21 TABLET | Refills: 0 | Status: SHIPPED | OUTPATIENT
Start: 2024-09-05 | End: 2024-09-12

## 2024-09-05 NOTE — ED PROVIDER NOTES
Chief Complaint   Patient presents with    Rash       HPI:     Krystal Carrillo is a 69 year old female who presents today with a chief complaint of itchy rash to the left low back that started yesterday.  Reports some pain in the area prior to the rash starting.  Not painful currently.  She denies fever.  She denies any new soaps, lotions, detergents or medications.  She is very concerned about shingles.    PFSH      PFSH asessment screens reviewed and agree.  Nurses notes reviewed I agree with documentation.    Family History   Problem Relation Age of Onset    Other (sepsis) Father          age 68    Colon Cancer Mother          age 41    Other (no siblings) Other     Other (1 son, 1 daughter) Other         allergies     Family history reviewed with patient/caregiver and is not pertinent to presenting problem.  Social History     Socioeconomic History    Marital status:      Spouse name: Not on file    Number of children: Not on file    Years of education: Not on file    Highest education level: Not on file   Occupational History    Not on file   Tobacco Use    Smoking status: Never    Smokeless tobacco: Never   Vaping Use    Vaping status: Never Used   Substance and Sexual Activity    Alcohol use: Not Currently    Drug use: Never    Sexual activity: Not on file   Other Topics Concern     Service Not Asked    Blood Transfusions Not Asked    Caffeine Concern Yes     Comment: Coffee 3 cups daily    Occupational Exposure Not Asked    Hobby Hazards Not Asked    Sleep Concern Not Asked    Stress Concern Not Asked    Weight Concern Not Asked    Special Diet Not Asked    Back Care Not Asked    Exercise Not Asked    Bike Helmet Not Asked    Seat Belt Not Asked    Self-Exams Not Asked   Social History Narrative    Not on file     Social Determinants of Health     Financial Resource Strain: Low Risk  (2023)    Financial Resource Strain     Difficulty of Paying Living Expenses: Not very hard      Med Affordability: No   Food Insecurity: Not on file   Transportation Needs: No Transportation Needs (4/24/2023)    Transportation Needs     Lack of Transportation: No   Physical Activity: Not on file   Stress: Not on file   Social Connections: Not on file   Housing Stability: Not on file         ROS:   Positive for stated complaint: rash  All other systems reviewed and negative except as noted above.  Constitutional and Vital Signs Reviewed.      Physical Exam:     Rash:    Irregular erythematous raised macule ~ 5 x 3 cm, to the left low back.  Area is nontender.  No vesicles.  No red streaking.    Physical Exam:  /58   Pulse 65   Temp 97.8 °F (36.6 °C) (Temporal)   Resp 19   SpO2 97%   GENERAL: well developed, well nourished, well hydrated, no distress  SKIN: good skin turgor, see above description for rash      MDM/Assessment/Plan:   Orders for this encounter:    Orders Placed This Encounter    valACYclovir 1 G Oral Tab     Sig: Take 1 tablet (1,000 mg total) by mouth 3 (three) times daily for 7 days.     Dispense:  21 tablet     Refill:  0    triamcinolone 0.1 % External Cream     Sig: Apply topically 2 (two) times daily for 7 days.     Dispense:  45 g     Refill:  0       Labs performed this visit:  No results found for this or any previous visit (from the past 10 hour(s)).    MDM:  Medical Decision Making  Differentials considered: Insect bite versus shingles versus cellulitis versus lymphangitis versus other    Suspect insect bite.  However patient reports there is pain in the area prior to the rash starting.  No pain currently, makes me slightly suspicious for shingles although no clear vesicles on exam.  Area is nontender on exam today, low suspicion for cellulitis.  No proximal red streaking consistent with left otitis.  In an abundance of caution we will start valacyclovir, along with triamcinolone cream.  She was advised to return for any worsening symptoms.  Follow-up with primary care  provider in 1 week if no improvement.  Patient verbalized understanding and agreeable to plan of care.          Diagnosis:    ICD-10-CM    1. Rash  R21           All results reviewed and discussed with patient.  See AVS for detailed discharge instructions for your condition today.    Follow Up with:  No follow-up provider specified.

## 2024-09-05 NOTE — ED INITIAL ASSESSMENT (HPI)
Rash on L lower back, noticed yesterday. Reports itching at site. Concerned for shingles. Applying hydrocortisone cream to site.

## 2024-09-05 NOTE — DISCHARGE INSTRUCTIONS
Valacyclovir 1 tablet 3 times a day for 7 days  Triamcinolone cream 1 application twice a day for 7 days  Wash hands well  Return for worsening symptoms   Follow-up with primary care provider in 1 week if no improvement

## 2024-10-02 NOTE — ADDENDUM NOTE
Addended by: Nahed Keller on: 8/8/2017 07:30 PM     Modules accepted: Orders no abrasion/no back pain/no bruising/no deformity/no fever/no numbness/no stiffness/no tingling/no weakness

## 2024-10-19 ENCOUNTER — TELEPHONE (OUTPATIENT)
Dept: INTERNAL MEDICINE CLINIC | Facility: CLINIC | Age: 69
End: 2024-10-19

## 2024-10-19 NOTE — TELEPHONE ENCOUNTER
10/16/2024  - Dr. Aguiar  - Maintained on Xarelto, tolerating without side effects  - Continued on spironolactone, but intermittent substernal chest pain that comes and goes, has remained stable.  Trying to bring down the triglycerides

## 2024-11-11 RX ORDER — SIMVASTATIN 10 MG
10 TABLET ORAL EVERY OTHER DAY
Qty: 45 TABLET | Refills: 1 | Status: SHIPPED | OUTPATIENT
Start: 2024-11-11

## 2024-11-11 NOTE — TELEPHONE ENCOUNTER
Refill request is for a maintenance medication and has met the criteria specified in the Ambulatory Medication Refill Standing Order for eligibility, visits, laboratory, alerts and was sent to the requested pharmacy.    Requested Prescriptions     Signed Prescriptions Disp Refills    simvastatin 10 MG Oral Tab 45 tablet 1     Sig: TAKE 1 TABLET BY MOUTH EVERY OTHER DAY     Authorizing Provider: JANNY LOPEZ     Ordering User: SANDRA PORTER

## 2024-12-14 ENCOUNTER — TELEPHONE (OUTPATIENT)
Dept: INTERNAL MEDICINE CLINIC | Facility: CLINIC | Age: 69
End: 2024-12-14

## 2024-12-14 NOTE — TELEPHONE ENCOUNTER
For orthopedics and sports medicine Dr. Villar, x-rays of the left foot and ankle.  Recurrent plantar fasciitis, status post Kenalog injection.

## 2024-12-21 ENCOUNTER — TELEPHONE (OUTPATIENT)
Dept: INTERNAL MEDICINE CLINIC | Facility: CLINIC | Age: 69
End: 2024-12-21

## 2024-12-21 DIAGNOSIS — F32.A ANXIETY AND DEPRESSION: ICD-10-CM

## 2024-12-21 DIAGNOSIS — F41.9 ANXIETY AND DEPRESSION: ICD-10-CM

## 2024-12-23 RX ORDER — ALPRAZOLAM 0.25 MG/1
TABLET ORAL
Qty: 30 TABLET | Refills: 2 | Status: SHIPPED | OUTPATIENT
Start: 2024-12-23

## 2024-12-23 NOTE — TELEPHONE ENCOUNTER
To MD:  The above refill request is for a controlled substance.  Please review pended medication order.   Print and sign for staff to fax to pharmacy or prescribe electronically.    Last office visit: 6/19/24 ()  Last time refill sent and quantity/refills: 4/9/24 #30 with 2 refills

## 2025-03-17 ENCOUNTER — HOSPITAL ENCOUNTER (OUTPATIENT)
Dept: BONE DENSITY | Facility: HOSPITAL | Age: 70
Discharge: HOME | End: 2025-03-17
Attending: INTERNAL MEDICINE
Payer: MEDICARE

## 2025-03-17 DIAGNOSIS — Z78.0 POSTMENOPAUSAL: ICD-10-CM

## 2025-03-17 PROCEDURE — 77080 DXA BONE DENSITY AXIAL: CPT | Performed by: INTERNAL MEDICINE

## 2025-04-24 ENCOUNTER — TELEPHONE (OUTPATIENT)
Dept: INTERNAL MEDICINE CLINIC | Facility: CLINIC | Age: 70
End: 2025-04-24

## 2025-04-24 NOTE — TELEPHONE ENCOUNTER
Received outside hospital records/20 1/2025, Dr. 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  She is on Xarelto without recurrence of DVT.  Triglycerides need to come down encouraged plant-based diet.  Follow-up labs in 6 months.

## 2025-04-30 NOTE — H&P
HPI:   Krystal Salguero is a 69 year old female who presents for a Medicare Subsequent Annual Wellness visit (Pt already had Initial Annual Wellness).    Has a trap nerve from prior mastectomy and breast surgery. She has been more active and the ppain has moved down in the RLQ. Has had history of bowel resection in the RLQ area. It comes and goes with different varaition , sharp and vibrating/shooting pain. Touching it can cause referred pain to the breast. It just started 1 month ago. Resting is the only thing that helps. It doesn't last very long. She's hesitant on trying medication        I reviewed and updated the PMHx, FamHx, medications, allergies, and SocHx as below with the patient    OB/GYN  Last menstrual period: Postmenopausal    SocHX:  - Physical Activity: sedentary, 2 flights of stairs up and down.   - Works as RN - [employer]   - Nutrition: trying to improve.    - Hobbies: seeing family and grandkids.  - Physical activity. Cleaning the house, lifting and moving the      Her last annual assessment has been over 1 year: Annual Physical due on 02/04/2023         Fall Risk Assessment: She has been screened for Falls and is low risk.    Cognitive Assessment: She had a completely normal cognitive assessment - see flowsheet entries       Functional Ability/Status: Krystal Salguero has some abnormal functions as listed below:  She has Hearing problems based on screening of functional status.      Depression Screening (PHQ-2/PHQ-9): Over the LAST 2 WEEKS          Advanced Directive: She does NOT have a Living Will. [Do you have a living will?: (Patient-Rptd) No]  She does NOT have a Power of  for Health Care. [Do you have a healthcare power of ?: (Patient-Rptd) No]     Tobacco:  She has never smoked tobacco.    CAGE Alcohol Screen: CAGE screening score of 0 on 4/30/2025, showing low risk of alcohol abuse.      Patient Care Team: Patient Care Team:  Ronaldo Flores MD as PCP - General (Internal  Medicine)  Luis Manzanares MD (GASTROENTEROLOGY)    Patient Active Problem List   Diagnosis    Hyperlipidemia, mixed    Hiatal hernia    Hypertension, essential    History of breast cancer    Anxiety and depression    Family history of colon cancer in mother    Deep vein thrombosis (DVT) of femoral vein of left lower extremity (HCC)    Factor 5 Leiden mutation, heterozygous (HCC)    May-Thurner syndrome    DCIS (ductal carcinoma in situ)    Varicose veins    Iron deficiency anemia, unspecified iron deficiency anemia type    History of adenomatous polyp of colon    Mild chronic anemia    Acute deep vein thrombosis (DVT) of iliac vein of right lower extremity (HCC)    Diverticulitis of sigmoid colon    Sigmoid diverticulitis    Pre-op testing     Wt Readings from Last 3 Encounters:   05/02/25 188 lb (85.3 kg)   06/19/24 187 lb 8 oz (85 kg)   04/09/24 193 lb (87.5 kg)      Last Cholesterol Labs:   Lab Results   Component Value Date    CHOLEST 166 04/15/2023    HDL 42 04/15/2023    LDL 91 04/15/2023    TRIG 190 (H) 04/15/2023          Last Chemistry Labs:   Lab Results   Component Value Date    AST 17 12/02/2023    ALT 12 12/02/2023    CA 10.1 12/02/2023    ALB 4.3 12/02/2023    TSH 3.030 04/15/2023    CREATSERUM 1.01 12/02/2023    GLU 82 12/02/2023        CBC  (most recent labs)   Lab Results   Component Value Date    WBC 6.5 12/02/2023    HGB 11.1 (L) 12/02/2023    .0 12/02/2023        ALLERGIES:   She is allergic to venofer [iron sucrose], adhesive tape, bacitracin, cephalexin, ciprofloxacin, levofloxacin, and psyllium.    CURRENT MEDICATIONS:   Outpatient Medications Marked as Taking for the 5/2/25 encounter (Office Visit) with Ronaldo Flores MD   Medication Sig    ALPRAZOLAM 0.25 MG Oral Tab TAKE ONE TABLET UP TO ONCE A DAY IF NEEDED FOR ANXIETY    simvastatin 10 MG Oral Tab TAKE 1 TABLET BY MOUTH EVERY OTHER DAY    lisinopril 40 MG Oral Tab Take 1 tablet (40 mg total) by mouth daily.    atenolol 50 MG  Oral Tab Take 1.5 tablets (75 mg total) by mouth daily.    omeprazole 20 MG Oral Capsule Delayed Release Take 1 capsule (20 mg total) by mouth before breakfast.    rivaroxaban (XARELTO STARTER PACK) 15 & 20 MG Oral Tablet Therapy Pack Take 20 mg by mouth daily. Per Alexian Brothers doctor--15 mg bid for 20 days, then starting 11/27/22 take 20 mg daily.    Cholecalciferol (VITAMIN D3) 75 MCG (3000 UT) Oral Tab Take 1 tablet by mouth daily.      MEDICAL INFORMATION:   She  has a past medical history of Anxiety, Chapman's esophagus, BPV (benign positional vertigo) (2008), Breast cancer, left (HCC) (2013), Breast cancer, right (HCC) (1989), Colitis (2001), Degenerative joint disease (DJD) of hip (2018), Diverticulitis, Diverticulosis (1997), DVT, bilateral lower limbs ([facility]) (age 18), Esophageal reflux, Esophagitis, Factor 5 Leiden mutation, heterozygous ([facility]) (07/2017), Factor V Leiden (HCC), Gastritis, Hematuria (2017), Hemorrhoids (2002), Hiatal hernia, High blood pressure, High cholesterol, History of gastroscopy (2004/2009/2012), History of vein stripping, Hyperlipidemia, Hypertension, essential (1993), Iron deficiency anemia (2004), Left leg DVT ([facility]) (07/2017), May-Thurner syndrome (2017), Right leg DVT (HCC) (11/2022), Screening for heart disease (06/2017), and Vertigo (2009).    She  has a past surgical history that includes mastectomy right (1989); implantable breast prosthesis (Right, 1989); chemotherapy (Right, 1989); upper gi endoscopy,diagnosis (2009); biopsy of uterus lining; cholecystectomy (1973); appendectomy; t&a; mastectomy left (07/2013); implantable breast prosthesis (Left, 07/2013); hernia surgery (2012); colonoscopy (1997); colonoscopy (2002); colonoscopy (9/12, 7/17); other surgical history; egd (9/12, 7/17); vein ligation and stripping (Bilateral, 1970s); egd (N/A, 07/28/2017); stent pl single ves (2017); egd (10/2021); colonoscopy (10/2021); egd (N/A, 10/22/2021); colonoscopy (N/A, 03/06/2023);  and other surgical history (Right).    Her family history includes 1 son, 1 daughter in an other family member; Colon Cancer in her mother; no siblings in an other family member; sepsis in her father.   SOCIAL HISTORY:   She  reports that she has never smoked. She has never used smokeless tobacco. She reports that she does not currently use alcohol. She reports that she does not use drugs.     REVIEW OF SYSTEMS:   GENERAL: feels well otherwise  SKIN: denies any unusual skin lesions  EYES: denies blurred vision or double vision  HEENT: denies nasal congestion, sinus pain or ST  LUNGS: denies shortness of breath with exertion  CARDIOVASCULAR: denies chest pain on exertion  GI: denies abdominal pain, denies heartburn  : denies dysuria, vaginal discharge or itching, no complaint of urinary incontinence   MUSCULOSKELETAL: denies back pain  NEURO: denies headaches -paresthesias right chest, right abdomen  PSYCHE: denies depression or anxiety  HEMATOLOGIC: denies hx of anemia  ENDOCRINE: denies thyroid history  ALL/ASTHMA: denies hx of allergy or asthma    EXAM:   /82   Pulse 63   Resp 18   Ht 5' 4\" (1.626 m)   Wt 188 lb (85.3 kg)   SpO2 98%   BMI 32.27 kg/m²  Estimated body mass index is 32.27 kg/m² as calculated from the following:    Height as of this encounter: 5' 4\" (1.626 m).    Weight as of this encounter: 188 lb (85.3 kg).    Medicare Hearing Assessment  (Required for AWV/SWV)    Hearing Screening    Time taken: 5/2/2025  8:54 AM  Entry User: Swati Irizarry MA  Screening Method: Questionnaire  I have a problem hearing over the telephone: No I have trouble following the conversations when two or more people are talking at the same time: No   I have trouble understanding things on the TV: No I have to strain to understand conversations: No   I have to worry about missing the telephone ring or doorbell: No I have trouble hearing conversations in a noisy background such as a crowded room or restaurant:  No   I get confused about where sounds come from: No I misunderstand some words in a sentence and need to ask people to repeat themselves: No   I especially have trouble understanding the speech of women and children: No I have trouble understanding the speaker in a large room such as at a meeting or place of [employer]: No   Many people I talk to seem to mumble (or don't speak clearly): No People get annoyed because I misunderstand what they say: No   I misunderstand what others are saying and make inappropriate responses: No I avoid social activities because I cannot hear well and fear I will reply improperly: No   Family members and friends have told me they think I may have hearing loss: No                     Visual Acuity  Right Eye Visual Acuity: Corrected Right Eye Chart Acuity: 20/30   Left Eye Visual Acuity: Corrected Left Eye Chart Acuity: 20/30   Both Eyes Visual Acuity: Corrected Both Eyes Chart Acuity: 20/30   Able To Tolerate Visual Acuity: Yes      General Appearance:  Alert, cooperative, no distress, appears stated age   Head:  Normocephalic, without obvious abnormality, atraumatic   Eyes:  PERRL, conjunctiva/corneas clear, EOM's intact both eyes   Ears:  Normal TM's and external ear canals, both ears   Nose: Nares normal, septum midline,mucosa normal, no drainage or sinus tenderness   Throat: Lips, mucosa, and tongue normal; teeth and gums normal   Neck: Supple, symmetrical, trachea midline, no adenopathy;  thyroid: not enlarged, symmetric, no tenderness/mass/nodules; no carotid bruit or JVD   Back:   Symmetric, no curvature, ROM normal, no CVA tenderness   Lungs:   Clear to auscultation bilaterally, respirations unlabored   Heart:  Regular rate and rhythm, S1 and S2 normal, no murmur, rub, or gallop   Abdomen:   Soft, non-tender, bowel sounds active all four quadrants,  no masses, no organomegaly   Pelvic: Deferred   Extremities: Extremities normal, atraumatic, no cyanosis or edema   Pulses: 2+ and  symmetric radial b/l   Skin: Skin color, texture, turgor normal, no rashes or lesions   Lymph nodes: Cervical, supraclavicular, and axillary nodes normal   Neurologic: Normal, CN II through XII intact, 5 out of 5 muscle strength throughout, 2+ DTRs patellar tendons, normal gait       Well woman exam deferred    Vaccination History     Immunization History   Administered Date(s) Administered    Covid-19 Vaccine Pfizer 30 mcg/0.3 ml 12/29/2020, 02/08/2021, 08/01/2021, 10/11/2021    FLU VAC High Dose 65 YRS & Older PRSV Free (58898) 10/04/2019, 10/15/2022, 10/27/2023    FLULAVAL 6 months & older 0.5 ml Prefilled syringe (86910) 10/10/2018    Flucelvax Influenza vaccine, trivalent (ccIIV3), 0.5mL IM 10/27/2023    Influenza 10/15/2020, 10/04/2021    TDAP 01/08/2020        ASSESSMENT AND OTHER RELEVANT CHRONIC CONDITIONS:   Krystal Salguero is a 69 year old female who presents for a Medicare Assessment.     EKG 4/13/2023  Rate: 72 Rhythm: Sinus  Axis: Left axis deviation   Intervals: DC: 164  QRS: 80  QT: 366/ QTc 400    ST Abnormalities: None    Impression: Normal sinus rhythm with left axis deviation  Comparison: No changes since last EKG 7/21/2017      MRI abdomen liver with and without IV contrast 10/14/2022  Impression   CONCLUSION:   1. Moderate hepatic steatosis with more pronounced focal fatty infiltration accounting for the 2 hypoattenuating lesions which were seen on CT scan.  No suspicious hepatic lesion.   2. Moderate to large hiatal hernia with apparent thickening of folds related to the hernia probably related to underdistention near the level of the diaphragm.  Less likely gastritis .   3. Gastroesophageal reflux likely accounting for fluid in distal esophagus.   4. Colonic diverticulosis.   5. Left common iliac vein wall stent.          MRI enterography abdomen and pelvis 10/14/2022  Impression   CONCLUSION:   1.  Enterography of the small bowel is grossly unremarkable.   2.  There is left-sided  diverticulosis.  There are findings suggesting acute or chronic diverticulitis involving the proximal and mid sigmoid colon, and the greatest inflammatory changes are associated with a 12 cm segment of colonic inflammation   involving the mid sigmoid colon.  In this region there is luminal narrowing, colonic enhancement and surrounding fat stranding. While localized colitis/diverticulitis in this region is suspected, a mass lesion could also give this appearance.   No   upstream bowel dilatation.  There is a 6 cm tubular shaped band of gas along the superior margin of the mid sigmoid colon which may represent a site of previous contained perforation.  No surrounding lymphadenopathy.   3.  Mild diffuse hepatic steatosis.  In the inferior aspect of segment 5 there is a 13 mm diameter hypoenhancing focus which loses signal on opposed-phase imaging, suggesting it represents more advanced focal fatty infiltration, however this finding is   otherwise suboptimally assessed on this exam due to enterography technique.  A separate hypodense focus was present on recent outside institution CT, also in the same hepatic segment and the 2nd focus is not clearly demonstrated on this exam.  Correlate   with MRI abdomen with without contrast performed immediately prior to this exam and dictated separately.   4.  Post cholecystectomy.  There is intrahepatic biliary ductal dilatation and borderline common bile duct dilatation.  These findings likely represent age related and post cholecystectomy changes however correlate for biliary lab abnormality.  MRCP was   not performed.  Pancreatic divisum is present.   5.  T2 bright foci within the kidneys compatible with simple cysts however a few foci are too small to definitively characterize.  In the lower pole of the right kidney there is also a 7 mm diameter cortical based focus which has characteristics most   compatible with a hemorrhagic/proteinaceous cyst however it is too small to  definitively characterize.  Differential for this latter finding includes renal neoplasm, and recommend surveillance imaging in 12 months with MRI.   6.  Moderate-sized hiatal hernia.          DEXA scan 3/17/2025    Impression   CONCLUSION:  1. Osteopenia, which according to World Health Organization criteria places the patient at a mild-to-moderately increased risk for fracture.     2. Based on left femoral neck bone mineral density, the FRAX 10 year probability of a major osteoporotic fracture is 9.8% and the 10 year probability of a hip fracture is 1.5%.       Colonoscopy 3/6/2023  Final Diagnosis:      Sigmoid colon; biopsy:  Fragments of colonic mucosa with mild lamina propria chronic inflammation.  No evidence of microscopic colitis, significant glandular distortion, acute cryptitis, granuloma, dysplasia, or malignancy is identified.     Surgical pathology 4/19/2023  Final Diagnosis:      Sigmoid colon and colonic donuts/rings (2); laparoscopic sigmoid colectomy:   Cylindrical portion of colonic tissue demonstrating diffuse diverticulosis and prominent region of severe acute and chronic diverticulitis, associated areas of congestive neovascularization with granulation tissue formation, prominent surrounding fibrous adhesions extending into the pericolic fibroadipose tissues and accompanying luminal stricture.   No evidence of tissue-invasive fungal organisms, parasitic organisms, viral inclusions, caseating epithelioid granulomas, active/acute colitis, increased intraepithelial lymphocytes, polyps, epithelial dysplasia or malignancy identified.   Proximal and distal colonic specimen end margins demonstrating focal mild perivascular acute inflammatory infiltrates within the adventitial soft tissues.  Separate colonic anastomotic rings/donuts (2) showing no significant pathologic abnormalities.   Five regional lymph nodes showing no evidence of tissue-invasive fungal organisms, parasitic organisms, viral  inclusions, epithelioid granulomas, or primary or metastatic malignant neoplastic infiltrates.        PLAN SUMMARY:   Diagnoses and all orders for this visit:    Annual physical exam    Anxiety and depression    Hypertension, essential    Mild chronic anemia  -     Ferritin; Future  -     Iron And Tibc; Future    Hyperlipidemia, mixed    Screening for thyroid disorder  -     TSH W Reflex To Free T4; Future    Acquired hypothyroidism    Microscopic hematuria    Osteopenia, unspecified location    Factor 5 Leiden mutation, heterozygous (HCC)    May-Thurner syndrome    Vitamin D deficiency  -     Vitamin D; Future    Encounter for annual health examination    Iron deficiency anemia, unspecified iron deficiency anemia type  -     CBC W Differential W Platelet [E]; Future    Hepatic steatosis  -     Hepatic Function Panel (7) [E]; Future        Right-sided neuropathic pain  History of implants after mastectomy.  Since then, has had chronic intermittent episodes of vibrating neuropathic type pain with radiation towards the middle chest.  - Follows with her surgeon, typically received nerve blocks with improvement of symptoms.  Last injection was about a year ago  - Not requiring any over-the-counter medications for now  - Advised to follow with pain clinic for further evaluation  - There has been some progression with involving the right lower quadrant with referred pain symptomatically.  I was not able to elicit this on examination today  - I suspect that there is some impingement from scar tissue from prior surgeries in the chest and lower abdomen area.  We can try conservative measures in the meantime      Will try some stretches and exercises in the hope that we can open up the areas and decrease his impingement from prior scar tissue  We can try magnesium 200 mg once a day.  Should be careful as this can induce more loose stools, diarrhea as a potential side effect    If still no better, we can proceed with a CT scan  of the abdomen and pelvis, chest area to look for structural abnormalities given history of prior surgeries.  She would like to hold off on this for now    Can consider neuropathic medication such as gabapentin, Lyrica    If needed, evaluate with pain clinic for further symptomatic support such as injections to the area.    Diverticulitis of sigmoid colon  treated w oral abx by Dr Manzanares and Dr Peña based on CT a/p MRI enterography in 10/2022.  Recent colonoscopy by Dr. Manzanares 3/6/2022 which showed extensive diverticulosis with marked narrowing and edema.  Biopsy showed chronic diverticulitis with stricture of the sigmoid colon, with concern for pericolonic inflammation and luminal narrowing without perforation abscess or colovesicular fistula.  -Surgical pathology 4/19: Diffuse diverticulosis, prominent region of severe acute and chronic diverticulitis without evidence of infection/active-acute colitis/malignancy.  Proximal distal colonic specimen with focal mild perivascular acute inflammatory infiltrates  -Did see surgery, Dr. Peña  5/25/2023, is overall recovered from surgery       Hypertension  Blood pressure remains well controlled  - Continue checking at home  - atenolol 50 mg 1.5 tabs daily and lisinopril 40 mg daily.     -We discussed conservative measures to decrease blood pressure over time:    -Targeted weight loss has been found to be the most effective way to decrease blood pressure over time  -Optimizing nutrition with incorporation of high-fiber foods such as fruits, vegetables, whole-grain sources of carbohydrates (brown rice/wheat bread).  A specific nutritional program is called the DASH diet  -Cardiovascular exercise at least 150 minutes of moderate intensity exercise per week  -Eliminating or minimizing alcohol  -Decreasing salt intake, caffeine intake  -Getting adequate sleep of at least 7-8 hours  -Managing stress and anxiety      Anxiety and Depression  - venlafaxine ER 37.5 mg daily --  no longer taking  - Has xanax 0.25 mg if needed. 1x     Anemia, mild, chronic-  EGD/colonoscopy 10/22/21 Dr Manzanares--HH, benign gastric polyps, diverticulosis. (hx bret colon polyps 2017).  Fe one daily.  Pt doesn't want capsule.   -Did have recent acute blood loss anemia that did not require transfusion.  This was due to thrombectomy procedure in which hemoglobin was 7.6 on 11/6/2022  -Hgb 10.3 preop, stable with degree of iron deficiency anemia  - Hemoglobin today 9.0- stable  We will trend  - IV Venofer 200 mg while inpatient-experienced a allergic reaction, developed rash of the chest.  Zyrtec was added for antihistamine relief.  Venofer added to allergy list with rash  -Repeat CBC     Hyperlipemia, mixed  -simvastatin 10 mg QOD.  LDL 91, triglycerides 190  - Plan to repeat fasting lipid panel     May Thurner syndr  -L common iliac vein stent placed 2017 Dr. Aguiar (cardiology vascular) at [facility]. He rx xarelto 10 mg daily.  Hx DVT L leg--chronic --found 7/20/17 at [facility] vein clinic.  -Prior to surgery: Xarelto was held for 3 days.  Took Lovenox 1 mg/kg/16, 4/17.  Held anticoagulation 4/18 and morning of procedure  - Resumed on Xarelto    Received outside hospital records/20 1/2025, Dr. Aguiar cardiology     Chronic DVT of femoral vein left lower extremity.  She is on Xarelto without recurrence of DVT.  Triglycerides need to come down encouraged plant-based diet.  Follow-up labs in 6 months.     Factor 5 Leiden hetereozygous  -saw hematologist Dr Danilo Ceron 2017. on xarelto.   -We will need bridging as below  - Did have a recent acute DVT of femoral vein right lower extremity and iliac vein of right lower extremity which required thrombectomy at [facility] 11/2022  - Anticoagulation management as above     Vit D deficiency  -on Vit D 3000 units daily.. (pt changed from 5000 u 3x a wk in 2020). vitamin D level nl on 10/31/20 at gyn.  - Vitamin D 51.4, will repeat level     Hiatal hernia    -s/p lap. repair of paraesophageal hernia 2012. omeprazole 20 mg daily.      Hx Breast cancer   -S/p bilateral mastectomies. Post implants bilat.  has seen Dr. Elam at [place].      L hip DJD  -Dr. Waqar Wong in [place]--MRI left hip 10/17/18-left hip mild joint narrowing and chondral thinning     Hx hematuria eval 2017 and 2018  Dr. Anderson urologist at [facility]--> IVP,  CT abd pelvis w wo 5/31/18 [facility]-neg. Pt notes cysto 6/2018 ok.   CT urogram 8/10/17 and cystoscopy, urine cytology 8/2017 Dr. Horan- negative.  Dr Ku 9/2017- lab--neg for gomerular source, vasculitis, etc. UA done per gyn.  - Last urinalysis: Negative blood, 0-2 RBC    Osteopenia  - We discussed the management of osteopenia  Need to maintain adequate calcium intake through low-fat dairy as able  Further calcium supplementation can be considered  Vitamin D supplementation needed to maintain normal vitamin D levels    Goal calcium intake is 1200 mg/day from the diet.  Can consider over-the-counter calcium supplementation 500 mg - 1000 mg/day to reach this 1200 mg goal    Recommend vitamin D is 800 units once a day    Weightbearing exercise even walking for at least 30 minutes on most days of the week can help bone strength, muscle strength, and improved balance to reduce risk for falls.  Continue with exercise as tolerated        HTN Screen: At goal  DM Screen: Check fasting sugar  HLD Screen: Check fasting lipid panel  Osteoporosis Screen (>65 or < 65 with FRAX > 9.3%): Due for DEXA scan  HCV Screen: Considered low risk  HIV Screen: considered low risk  G/C/Syphilis: Considered low risk    Colon Cancer Screening (45-70): Last colonoscopy 3/6/2023, due 2028  Breast Cancer Screening (40-70): No longer indicated, history of bilateral mastectomy  Cervical Cancer Screening (21-64): No longer indicated  Lung Cancer Screening (55-79 with 30 p/year and active < 15 years): Not indicated    Influenza: Annually  Td/Tdap: Last Tdap 2020, due 2030  Zoster  (50+): Previously declined  HPV (19-26): Not indicated  Pneumococcal: Due, previously declined    Immunization History   Administered Date(s) Administered    Covid-19 Vaccine Pfizer 30 mcg/0.3 ml 12/29/2020, 02/08/2021, 08/01/2021, 10/11/2021    FLU VAC High Dose 65 YRS & Older PRSV Free (43011) 10/04/2019, 10/15/2022, 10/27/2023    FLULAVAL 6 months & older 0.5 ml Prefilled syringe ([id number]) 10/10/2018    Flucelvax Influenza vaccine, trivalent (ccIIV3), 0.5mL IM 10/27/2023    Influenza 10/15/2020, 10/04/2021    TDAP 01/08/2020         Diet assessment: good     PLAN:  The patient indicates understanding of these issues and agrees to the plan.  Reinforced healthy diet, lifestyle, and exercise.    No follow-ups on file.     Ronaldo Flores MD, 4/12/2023     General Health     In the past six months, have you lost more than 10 pounds without trying?: (Patient-Rptd) 2 - No  Has your appetite been poor?: (Patient-Rptd) No  How does the patient maintain a good energy level?: (Patient-Rptd) Other  How would you describe your daily physical activity?: (Patient-Rptd) Light  How would you describe your current health state?: (Patient-Rptd) Fair  How do you maintain positive mental well-being?: (Patient-Rptd) Social Interaction, Visiting Friends, Visiting Family      This section provided for quick review of chart, separate sheet to patient  PREVENTATIVE SERVICES  INDICATIONS AND SCHEDULE Internal Lab or Procedure External Lab or Procedure   Diabetes Screening      HbgA1C   Annually No results found for: \"A1C\"      No data to display                Fasting Blood Sugar (FSB)Annually Glucose (mg/dL)   Date Value   12/02/2023 82     GLUCOSE (mg/dL)   Date Value   04/01/2011 93          Cardiovascular Disease Screening     LDL Annually LDL Cholesterol (mg/dL)   Date Value   04/15/2023 91     LDL-CHOLESTEROL (mg/dL (calc))   Date Value   04/01/2011 90        EKG - w/ Initial Preventative Physical Exam only, or if medically necessary  Electrocardiogram date       Colorectal Cancer Screening      Colonoscopy Screen every 10 years Health Maintenance   Topic Date Due    Colorectal Cancer Screening  03/06/2028    Update Health Maintenance if applicable    Flex Sigmoidoscopy Screen every 10 years No results found for this or any previous visit.      No data to display                 Fecal Occult Blood Annually No results found for: \"FOB\"      No data to display                Glaucoma Screening      Ophthalmology Visit Annually: Diabetics, FHx Glaucoma, AA>50, > 65      No data to display                Bone Density Screening      Dexascan Every two years Last Dexa Scan:    XR DEXA BONE DENSITOMETRY (CPT=77080) 03/17/2025        No data to display                Pap and Pelvic      Pap: Every 3 yrs age 21-65 or Pap+HPV every 5 yrs age 30-65, age 65 and older at high risk No recommendations at this time Update Health Maintenance if applicable    Chlamydia  Annually if high risk No results found for: \"CHLAMYDIA\"      No data to display                Screening Mammogram      Mammogram Annually to 75, then as discussed No recommendations at this time Update Health Maintenance if applicable     Immunizations (Update Immunization Activity if applicable)     Influenza  Covered Annually 10/27/2023 Please get every year    Pneumococcal 13 (Prevnar)  Covered Once after 65 - Please get once after your 65th birthday    Pneumococcal 23 (Pneumovax)  Covered Once after 65 - Please get once after your 65th birthday    Hepatitis B for Moderate/High Risk - Medium/high risk factors:   End-stage renal disease   Hemophiliacs who received Factor VIII or IX concentrates   Clients of institutions for the mentally retarded   Persons who live in the same house as a HepB virus carrier   Homosexual men   Illicit injectable drug abusers     Tetanus Toxoid  Only covered with a cut with metal- TD and TDaP Not covered by Medicare Part B - This may be covered with your  prescription benefits, but Medicare does not cover unless Medically needed    Zoster  Not covered by Medicare Part B - This may be covered with your pharmacy  prescription benefits      SPECIFIC DISEASE MONITORING Internal Lab or Procedure External Lab or Procedure        Annual Monitoring of Persistent Medications (ACE/ARB, digoxin diuretics, anticonvulsants)    Potassium Annually Lab Results   Component Value Date    K 4.4 12/02/2023         Creatinine   Annually Lab Results   Component Value Date    CREATSERUM 1.01 12/02/2023         BUN Annually Lab Results   Component Value Date    BUN 19 12/02/2023       Drug Serum Conc Annually No results found for: \"DIGOXIN\", \"DIG\", \"VALP\"

## 2025-04-30 NOTE — PATIENT INSTRUCTIONS
- You were seen in clinic for regular annual check-up. We have ordered labs for you and we will call you with the results. Please obtain the bloodwork fasting for 10**12 hours. OK to drink water the day of your blood draw.      We have the lab downstairs on the first floor.  No appointment is necessary.  Lab hours are Monday-Friday 7:30 AM to 4:45 PM.  There may be Saturday hours 7 AM-11:00 AM as well.     Otherwise you can obtain the blood tests on the weekends at the main hospital or local immediate care/urgent care within the [facility].    - There has been some progression with involving the right lower quadrant with referred pain symptomatically.  I was not able to elicit this on examination today  - I suspect that there is some impingement from scar tissue from prior surgeries in the chest and lower abdomen area.  We can try conservative measures in the meantime      Will try some stretches and exercises in the hope that we can open up the areas and decrease his impingement from prior scar tissue  We can try magnesium 200 mg once a day.  Should be careful as this can induce more loose stools, diarrhea as a potential side effect    If still no better, we can proceed with a CT scan of the abdomen and pelvis, chest area to look for structural abnormalities given history of prior surgeries.  She would like to hold off on this for now    Can consider neuropathic medication such as gabapentin, Lyrica    If needed, evaluate with pain clinic for further symptomatic support such as injections to the area.    Continue following with Dr. Aguiar for assessment-And management of chronic DVT history  - Monitor for any bleeding episodes while on blood thinners  - Please continue with breast cancer screening with your breast clinic at [facility]  -Please continue checking your blood pressures at home and notify us if they are increasing     - We discussed the management of osteopenia  Need to maintain adequate  calcium intake through low-fat dairy as able  Further calcium supplementation can be considered  Vitamin D supplementation needed to maintain normal vitamin D levels    Goal calcium intake is 1200 mg/day from the diet.  Can consider over-the-counter calcium supplementation 500 mg - 1000 mg/day to reach this 1200 mg goal    Recommend vitamin D is 800 units once a day    Weightbearing exercise even walking for at least 30 minutes on most days of the week can help bone strength, muscle strength, and improved balance to reduce risk for falls.  Continue with exercise as tolerated    -Vaccines may be due for: COVID dose #5, Prevnar 20/pneumonia shot, We recommended checking with your insurance for coverage of Shingrix, a 2-dose shingles vaccine that can be obtained at the pharmacy if there is adequate coverage through your insurance plan.    - Please continue to eat a varied diet including recommended servings of vegetables, fruits, and low fat dairy. Minimize high saturated fats (such as fast foods) and high sugar intake (such as soda)  - We recommend 150 minutes of moderate intensity exercise (brisk walking, swimming) weekly to maintain your current weight.  Targeted weight loss will require more vigorous exercise or more than 150 minutes/week.    Return to clinic in 6 months for follow-up

## 2025-05-02 ENCOUNTER — OFFICE VISIT (OUTPATIENT)
Dept: INTERNAL MEDICINE CLINIC | Facility: CLINIC | Age: 70
End: 2025-05-02
Payer: MEDICARE

## 2025-05-02 ENCOUNTER — LAB ENCOUNTER (OUTPATIENT)
Dept: LAB | Age: 70
End: 2025-05-02
Attending: INTERNAL MEDICINE
Payer: MEDICARE

## 2025-05-02 VITALS
BODY MASS INDEX: 32.1 KG/M2 | SYSTOLIC BLOOD PRESSURE: 136 MMHG | RESPIRATION RATE: 18 BRPM | OXYGEN SATURATION: 98 % | HEIGHT: 64 IN | HEART RATE: 63 BPM | DIASTOLIC BLOOD PRESSURE: 82 MMHG | WEIGHT: 188 LBS

## 2025-05-02 DIAGNOSIS — D68.51 FACTOR 5 LEIDEN MUTATION, HETEROZYGOUS (HCC): ICD-10-CM

## 2025-05-02 DIAGNOSIS — D50.9 IRON DEFICIENCY ANEMIA, UNSPECIFIED IRON DEFICIENCY ANEMIA TYPE: ICD-10-CM

## 2025-05-02 DIAGNOSIS — D64.9 MILD CHRONIC ANEMIA: ICD-10-CM

## 2025-05-02 DIAGNOSIS — Z00.00 ANNUAL PHYSICAL EXAM: Primary | ICD-10-CM

## 2025-05-02 DIAGNOSIS — E55.9 VITAMIN D DEFICIENCY: ICD-10-CM

## 2025-05-02 DIAGNOSIS — Z13.29 SCREENING FOR THYROID DISORDER: ICD-10-CM

## 2025-05-02 DIAGNOSIS — M85.80 OSTEOPENIA, UNSPECIFIED LOCATION: ICD-10-CM

## 2025-05-02 DIAGNOSIS — F41.9 ANXIETY AND DEPRESSION: ICD-10-CM

## 2025-05-02 DIAGNOSIS — E78.2 HYPERLIPIDEMIA, MIXED: ICD-10-CM

## 2025-05-02 DIAGNOSIS — Z00.00 ENCOUNTER FOR ANNUAL HEALTH EXAMINATION: ICD-10-CM

## 2025-05-02 DIAGNOSIS — R31.29 MICROSCOPIC HEMATURIA: ICD-10-CM

## 2025-05-02 DIAGNOSIS — I87.1 MAY-THURNER SYNDROME: ICD-10-CM

## 2025-05-02 DIAGNOSIS — F32.A ANXIETY AND DEPRESSION: ICD-10-CM

## 2025-05-02 DIAGNOSIS — I10 HYPERTENSION, ESSENTIAL: ICD-10-CM

## 2025-05-02 DIAGNOSIS — K76.0 HEPATIC STEATOSIS: ICD-10-CM

## 2025-05-02 DIAGNOSIS — E03.9 ACQUIRED HYPOTHYROIDISM: ICD-10-CM

## 2025-05-02 LAB
ALBUMIN SERPL-MCNC: 4.8 G/DL (ref 3.2–4.8)
ALP LIVER SERPL-CCNC: 112 U/L (ref 55–142)
ALT SERPL-CCNC: 11 U/L (ref 10–49)
AST SERPL-CCNC: 22 U/L (ref ?–34)
BASOPHILS # BLD AUTO: 0.04 X10(3) UL (ref 0–0.2)
BASOPHILS NFR BLD AUTO: 0.4 %
BILIRUB DIRECT SERPL-MCNC: 0.1 MG/DL (ref ?–0.3)
BILIRUB SERPL-MCNC: 0.6 MG/DL (ref 0.2–1.1)
DEPRECATED HBV CORE AB SER IA-ACNC: 10 NG/ML (ref 50–306)
DEPRECATED RDW RBC AUTO: 51.7 FL (ref 35.1–46.3)
EOSINOPHIL # BLD AUTO: 0.27 X10(3) UL (ref 0–0.7)
EOSINOPHIL NFR BLD AUTO: 2.8 %
ERYTHROCYTE [DISTWIDTH] IN BLOOD BY AUTOMATED COUNT: 14.8 % (ref 11–15)
HCT VFR BLD AUTO: 39.4 % (ref 35–48)
HGB BLD-MCNC: 12.2 G/DL (ref 12–16)
IMM GRANULOCYTES # BLD AUTO: 0.03 X10(3) UL (ref 0–1)
IMM GRANULOCYTES NFR BLD: 0.3 %
IRON SATN MFR SERPL: 23 % (ref 15–50)
IRON SERPL-MCNC: 91 UG/DL (ref 50–170)
LYMPHOCYTES # BLD AUTO: 1.15 X10(3) UL (ref 1–4)
LYMPHOCYTES NFR BLD AUTO: 11.7 %
MCH RBC QN AUTO: 29 PG (ref 26–34)
MCHC RBC AUTO-ENTMCNC: 31 G/DL (ref 31–37)
MCV RBC AUTO: 93.8 FL (ref 80–100)
MONOCYTES # BLD AUTO: 0.7 X10(3) UL (ref 0.1–1)
MONOCYTES NFR BLD AUTO: 7.2 %
NEUTROPHILS # BLD AUTO: 7.6 X10 (3) UL (ref 1.5–7.7)
NEUTROPHILS # BLD AUTO: 7.6 X10(3) UL (ref 1.5–7.7)
NEUTROPHILS NFR BLD AUTO: 77.6 %
PLATELET # BLD AUTO: 319 10(3)UL (ref 150–450)
PROT SERPL-MCNC: 8.1 G/DL (ref 5.7–8.2)
RBC # BLD AUTO: 4.2 X10(6)UL (ref 3.8–5.3)
TOTAL IRON BINDING CAPACITY: 396 UG/DL (ref 250–425)
TRANSFERRIN SERPL-MCNC: 335 MG/DL (ref 250–380)
TSI SER-ACNC: 2.31 UIU/ML (ref 0.55–4.78)
VIT D+METAB SERPL-MCNC: 81.2 NG/ML (ref 30–100)
WBC # BLD AUTO: 9.8 X10(3) UL (ref 4–11)

## 2025-05-02 PROCEDURE — 80076 HEPATIC FUNCTION PANEL: CPT

## 2025-05-02 PROCEDURE — 82728 ASSAY OF FERRITIN: CPT

## 2025-05-02 PROCEDURE — 83540 ASSAY OF IRON: CPT

## 2025-05-02 PROCEDURE — 82306 VITAMIN D 25 HYDROXY: CPT

## 2025-05-02 PROCEDURE — 84443 ASSAY THYROID STIM HORMONE: CPT

## 2025-05-02 PROCEDURE — 85025 COMPLETE CBC W/AUTO DIFF WBC: CPT

## 2025-05-02 PROCEDURE — 84466 ASSAY OF TRANSFERRIN: CPT

## 2025-05-02 PROCEDURE — 36415 COLL VENOUS BLD VENIPUNCTURE: CPT

## 2025-05-06 ENCOUNTER — TELEPHONE (OUTPATIENT)
Dept: INTERNAL MEDICINE CLINIC | Facility: CLINIC | Age: 70
End: 2025-05-06

## 2025-05-06 RX ORDER — SIMVASTATIN 10 MG
10 TABLET ORAL EVERY OTHER DAY
Qty: 45 TABLET | Refills: 3 | Status: SHIPPED | OUTPATIENT
Start: 2025-05-06

## 2025-05-06 NOTE — TELEPHONE ENCOUNTER
Called and spoke with patient and identified full name and date of birth.  Relayed Dr. Flores message and patient voiced understanding with treatment plan

## 2025-05-06 NOTE — TELEPHONE ENCOUNTER
Please notify patient reviewed the blood work from 5/2.  Vitamin D levels, liver function test, blood counts within normal limits.  There is still some iron deficiency, for which we should consider over-the-counter ferrous sulfate 325 mg every 48 hours to reduce risk for constipation.    The iron supplements may help with neuropathy related symptoms.,  Certainly something we can try that is less invasive from what we discussed.  Monitor for changes and notify us if we should proceed with further testing

## 2025-05-06 NOTE — TELEPHONE ENCOUNTER
Refill request is for a maintenance medication and has met the criteria specified in the Ambulatory Medication Refill Standing Order for eligibility, visits, laboratory, alerts and was sent to the requested pharmacy.    Requested Prescriptions     Signed Prescriptions Disp Refills    simvastatin 10 MG Oral Tab 45 tablet 3     Sig: TAKE 1 TABLET BY MOUTH EVERY OTHER DAY     Authorizing Provider: JANNY LOPEZ     Ordering User: DENICE PRATT

## 2025-05-13 ENCOUNTER — TELEPHONE (OUTPATIENT)
Dept: INTERNAL MEDICINE CLINIC | Facility: CLINIC | Age: 70
End: 2025-05-13

## 2025-05-13 NOTE — TELEPHONE ENCOUNTER
Patient would like to try the Gabapentin prescription Dr Flores offered at her last OV    Pharmacy [facility]     Please call patient to confirm

## 2025-05-14 RX ORDER — GABAPENTIN 300 MG/1
300 CAPSULE ORAL NIGHTLY
Qty: 90 CAPSULE | Refills: 3 | Status: SHIPPED | OUTPATIENT
Start: 2025-05-14

## 2025-05-14 NOTE — TELEPHONE ENCOUNTER
Please notify patient that I sent for gabapentin 300 mg nightly.  May take 3-6 weeks to take full effect, monitor for excessive drowsiness, sleepiness, lightheadedness.  May occur the first few days of starting the medication but should subside.  If needed, we can adjust dosage further

## 2025-05-23 ENCOUNTER — HOSPITAL ENCOUNTER (OUTPATIENT)
Age: 70
Discharge: HOME | End: 2025-05-23
Payer: MEDICARE

## 2025-05-23 VITALS
SYSTOLIC BLOOD PRESSURE: 152 MMHG | DIASTOLIC BLOOD PRESSURE: 70 MMHG | RESPIRATION RATE: 18 BRPM | HEART RATE: 67 BPM | OXYGEN SATURATION: 100 % | TEMPERATURE: 98 F

## 2025-05-23 DIAGNOSIS — R39.9 URINARY SYMPTOM OR SIGN: Primary | ICD-10-CM

## 2025-05-23 LAB
BILIRUB UR QL STRIP: NEGATIVE
CLARITY UR: CLEAR
COLOR UR: YELLOW
GLUCOSE UR STRIP-MCNC: NEGATIVE MG/DL
HGB UR QL STRIP: NEGATIVE
KETONES UR STRIP-MCNC: NEGATIVE MG/DL
NITRITE UR QL STRIP: NEGATIVE
PH UR STRIP: 5.5 [PH]
PROT UR STRIP-MCNC: NEGATIVE MG/DL
SP GR UR STRIP: 1.01
UROBILINOGEN UR STRIP-ACNC: <2 MG/DL

## 2025-05-23 PROCEDURE — 99213 OFFICE O/P EST LOW 20 MIN: CPT | Performed by: NURSE PRACTITIONER

## 2025-05-23 PROCEDURE — 81002 URINALYSIS NONAUTO W/O SCOPE: CPT | Performed by: NURSE PRACTITIONER

## 2025-05-23 RX ORDER — PHENAZOPYRIDINE HYDROCHLORIDE 200 MG/1
200 TABLET, FILM COATED ORAL 3 TIMES DAILY PRN
Qty: 6 TABLET | Refills: 0 | Status: SHIPPED | OUTPATIENT
Start: 2025-05-23 | End: 2025-05-30

## 2025-05-23 NOTE — ED PROVIDER NOTES
Patient Seen in: [facility]        History  Chief Complaint   Patient presents with    Urinary Symptoms     Stated Complaint: uti    Subjective:   HPI            69-year-old female presents with bladder spasming after urination that began yesterday.  She was drinking water all day and symptoms seem to have improved.  She does report using nystatin as prescribed by her OB/GYN a few days ago which may have irritated the area.  She is no longer using the cream to the genital region for rash.  No fever.  No vomiting.  No back pain.  Denies hematuria urgency frequency      Objective:     Past Medical History:    Anxiety    Chapman's esophagus    EGD 9/12 Dr. Manzanares-large HH, barretts with neg bx    BPV (benign positional vertigo)    Breast cancer, left (HCC)    L mastectomy/ implant (DCIS) Dr Elam    Breast cancer, right (HCC)    R mastectomy/implant--chemotherapy     Colitis    Degenerative joint disease (DJD) of hip    MRI left hip 10/17/18-left hip mild joint narrowing and chondral thinning--Dr. Waqar Wong in [place]--    Diverticulitis    2022    Diverticulosis    colonoscopy 1997-divertic and int hemorrhoids    DVT, bilateral lower limbs (HCC)    DVT both LEs age 18 after had gb removed when on birth control pills.     Esophageal reflux    Esophagitis    Factor 5 Leiden mutation, heterozygous (HCC)    Factor V Leiden (HCC)    Gastritis    Hematuria    gross hematuria on xarelto. 8/2017-CT urogram-ok. cysto, urine cytology neg (Dr Horan). Dr Ku 2017-not glomerular source; eval Dr Dede Anderson 5/2018-IVP,CT abd pelvis, cysto ok at [facility].    Hemorrhoids    colonoscopy 2002    Hiatal hernia    High blood pressure    High cholesterol    History of gastroscopy    EGD neg  2004; duod bx neg for sprue 2009    History of vein stripping    Hyperlipidemia    elevated cholesterol and triglycerides    Hypertension, essential    Iron deficiency anemia    endoscopy (-)    Left leg DVT (HCC)     May-Thurner syndrome    L common iliac vein stent 2017-Dr Aguiar    Right leg DVT (HCC)    thrombectomy iliac vein clot at [facility]    Screening for heart disease    calcium heart score 5    Vertigo              Past Surgical History:   Procedure Laterality Date    Appendectomy      Biopsy of uterus lining      endometrial bx-dysplasia    Chemotherapy Right 1989    Dr. Basurto    Cholecystectomy  1973    Colonoscopy  1997    Colonoscopy  2002    Colonoscopy  9/12, 7/17    Colonoscopy  10/2021    Colonoscopy N/A 03/06/2023    Procedure: COLONOSCOPY;  Surgeon: Luis Manzanares MD;  Location: [facility] ENDOSCOPY    Egd  9/12, 7/17    Egd N/A 07/28/2017    Procedure: ESOPHAGOGASTRODUODENOSCOPY, COLONOSCOPY, POSSIBLE BIOPSY, POSSIBLE POLYPECTOMY 90480, 12382;  Surgeon: Luis Manzanares MD;  Location: [facility]    Egd  10/2021    Egd N/A 10/22/2021    Procedure: ESOPHAGOGASTRODUODENOSCOPY,  with bxs COLONOSCOPY,;  Surgeon: Luis Manzanares MD;  Location: [facility]    Hernia surgery  2012    lap. paraesophageal hernia repair ; lap. partial fundoplicaton 270 degrees w paraesoph hernia repair; Dr Peña    Implantable breast prosthesis Right 1989    Implantable breast prosthesis Left 07/2013    Dr Lunsford    Mastectomy left  07/2013    Dr Lunsford    Mastectomy right  1989    Other surgical history      vein stripping    Other surgical history Right     Knee thrombectomy    Stent pl single ves  2017    L common iliac vein stent 2017 for May Thurner syndrome. Dr Aguiar    T&a      Upper gi endoscopy,diagnosis  2009    duodenal biopsy neg for sprue    Vein ligation and stripping Bilateral 1970s    bilateral leg veins stripped                Social History     Socioeconomic History    Marital status:    Tobacco Use    Smoking status: Never    Smokeless tobacco: Never   Vaping Use    Vaping status: Never Used   Substance and Sexual Activity    Alcohol use: Not Currently     Drug use: Never   Other Topics Concern    Caffeine Concern Yes     Comment: Coffee 3 cups daily     Social Drivers of Health     Transportation Needs: No Transportation Needs (4/24/2023)    Transportation Needs     Lack of Transportation: No              Review of Systems    Positive for stated complaint: uti  Other systems are as noted in HPI.  Constitutional and vital signs reviewed.      All other systems reviewed and negative except as noted above.                  Physical Exam    ED Triage Vitals [05/23/25 1648]   /70   Pulse 67   Resp 18   Temp 97.9 °F (36.6 °C)   Temp src Oral   SpO2 100 %   O2 Device None (Room air)       Current Vitals:   Vital Signs  BP: 152/70  Pulse: 67  Resp: 18  Temp: 97.9 °F (36.6 °C)  Temp src: Oral    Oxygen Therapy  SpO2: 100 %  O2 Device: None (Room air)            Physical Exam  Vitals and nursing note reviewed.   Constitutional:       Appearance: Normal appearance. She is not ill-appearing.   Cardiovascular:      Rate and Rhythm: Normal rate.      Pulses: Normal pulses.   Pulmonary:      Effort: Pulmonary effort is normal.   Abdominal:      Tenderness: There is no right CVA tenderness or left CVA tenderness.   Neurological:      Mental Status: She is alert and oriented to person, place, and time.                 ED Course  Labs Reviewed   [facility] POCT URINALYSIS DIPSTICK - Abnormal; Notable for the following components:       Result Value    Leukocyte esterase urine Trace (*)     All other components within normal limits   URINE CULTURE, ROUTINE                            MDM             Medical Decision Making  Patient presents to the emergency room for bladder spasm.  History and physical exam as above.  No flank or abdominal tenderness or flank pain.  Afebrile, nontoxic and does not meet SIRS criteria.  UA unremarkable except for trace leukocytes.  Symptoms began after using nystatin cream to the vaginal region for possible yeast she has completed the treatment/discussed  antibiotic treatment patient will await for urine culture as symptoms do seem to be improving.  She will start Pyridium.  Recommend follow-up with PCP.  Counseled on hygiene and preventative measures.  Return to ED precautions discussed with the patient who verbalized understanding.        Problems Addressed:  Urinary symptom or sign: acute illness or injury    Risk  OTC drugs.  Prescription drug management.        Disposition and Plan     Clinical Impression:  1. Urinary symptom or sign         Disposition:  Discharge  5/23/2025  5:30 pm    Follow-up:  Ronaldo Flores MD  [address] [place] [id number]  [phone]    Schedule an appointment as soon as possible for a visit in 3 days  If symptoms worsen go to the ER          Medications Prescribed:  Current Discharge Medication List        START taking these medications    Details   phenazopyridine 200 MG Oral Tab Take 1 tablet (200 mg total) by mouth 3 (three) times daily as needed for Pain.  Qty: 6 tablet, Refills: 0                   Supplementary Documentation:

## 2025-05-23 NOTE — DISCHARGE INSTRUCTIONS
Urine culture pending   Stop nystantin  Start pyridium- if culture is positive- antibiotic will be called in

## 2025-06-03 ENCOUNTER — TELEPHONE (OUTPATIENT)
Dept: INTERNAL MEDICINE CLINIC | Facility: CLINIC | Age: 70
End: 2025-06-03

## 2025-06-03 NOTE — TELEPHONE ENCOUNTER
Outside hospital records, [facility] medical [facility], pain medicine evaluation with Dr. Cisneros  - Postmastectomy syndrome 2-month history of right abdominal/lower chest pain, numbness/tingling.  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

## 2025-06-19 RX ORDER — LISINOPRIL 40 MG/1
40 TABLET ORAL DAILY
Qty: 90 TABLET | Refills: 3 | Status: SHIPPED | OUTPATIENT
Start: 2025-06-19

## 2025-06-19 RX ORDER — OMEPRAZOLE 20 MG/1
20 CAPSULE, DELAYED RELEASE ORAL
Qty: 90 CAPSULE | Refills: 3 | Status: SHIPPED | OUTPATIENT
Start: 2025-06-19

## 2025-06-19 RX ORDER — ATENOLOL 50 MG/1
75 TABLET ORAL DAILY
Qty: 135 TABLET | Refills: 3 | Status: SHIPPED | OUTPATIENT
Start: 2025-06-19

## 2025-06-19 NOTE — TELEPHONE ENCOUNTER
Refill request is for a maintenance medication and has met the criteria specified in the Ambulatory Medication Refill Standing Order for eligibility, visits, laboratory, alerts and was sent to the requested pharmacy.    Requested Prescriptions     Signed Prescriptions Disp Refills    atenolol 50 MG Oral Tab 135 tablet 3     Sig: TAKE 1 AND 1/2 TABLETS BY MOUTH DAILY     Authorizing Provider: JANNY LOPEZ     Ordering User: SANDRA PORTER    omeprazole 20 MG Oral Capsule Delayed Release 90 capsule 3     Sig: TAKE 1 CAPSULE BY MOUTH BEFORE BREAKFAST EVERY DAY     Authorizing Provider: JANNY LOPEZ     Ordering User: SANDRA PORTER    lisinopril 40 MG Oral Tab 90 tablet 3     Sig: TAKE 1 TABLET BY MOUTH EVERY DAY     Authorizing Provider: JANNY LOPEZ     Ordering User: SANDRA PORTER

## (undated) DEVICE — STANDARD HYPODERMIC NEEDLE,POLYPROPYLENE HUB: Brand: MONOJECT

## (undated) DEVICE — A P RESECTION: Brand: MEDLINE INDUSTRIES, INC.

## (undated) DEVICE — DISPOSABLE SUCTION/IRRIGATOR TUBE SET: Brand: AHTO

## (undated) DEVICE — [HIGH FLOW INSUFFLATOR,  DO NOT USE IF PACKAGE IS DAMAGED,  KEEP DRY,  KEEP AWAY FROM SUNLIGHT,  PROTECT FROM HEAT AND RADIOACTIVE SOURCES.]: Brand: PNEUMOSURE

## (undated) DEVICE — APPLICATOR COTTON TIP 6IN

## (undated) DEVICE — DERMABOND CLOSURE 0.7ML TOPICL

## (undated) DEVICE — SUT PDS II 2-0 CT-2 Z333H

## (undated) DEVICE — SUT PDS II 0 CT-1 Z346H

## (undated) DEVICE — SUT CHROMIC GUT 2-0 SH G123H

## (undated) DEVICE — STERILE LATEX POWDER-FREE SURGICAL GLOVESWITH NITRILE COATING: Brand: PROTEXIS

## (undated) DEVICE — ENCORE® LATEX MICRO SIZE 8, STERILE LATEX POWDER-FREE SURGICAL GLOVE: Brand: ENCORE

## (undated) DEVICE — 12 ML SYRINGE LUER-LOCK TIP: Brand: MONOJECT

## (undated) DEVICE — MEDI-VAC NON-CONDUCTIVE SUCTION TUBING 6MM X 1.8M (6FT.) L: Brand: CARDINAL HEALTH

## (undated) DEVICE — DRAPE,UNDRBUT,WHT GRAD PCH,CAPPORT,20/CS: Brand: MEDLINE

## (undated) DEVICE — DRAPE SHEET LAPCHOLE 124X100X7

## (undated) DEVICE — Device: Brand: DUAL NARE NASAL CANNULAE FEMALE LUER CON 7FT O2 TUBE

## (undated) DEVICE — ENDOPATH ECHELON ENDOSCOPIC LINEAR CUTTER RELOADS, BLUE, 60MM: Brand: ECHELON ENDOPATH

## (undated) DEVICE — GAMMEX® PI HYBRID SIZE 8, STERILE POWDER-FREE SURGICAL GLOVE, POLYISOPRENE AND NEOPRENE BLEND: Brand: GAMMEX

## (undated) DEVICE — BETADINE SOL 32 OZ 10% POVIDON

## (undated) DEVICE — 60 ML SYRINGE REGULAR TIP: Brand: MONOJECT

## (undated) DEVICE — STAPLER CIRCULAR ENDO 29MM

## (undated) DEVICE — BLADE 11 SHRP BP SS SRG STRL

## (undated) DEVICE — FORCEP RADIAL JAW 4

## (undated) DEVICE — NEEDLE HPO 18GA 1.5IN ECLPS

## (undated) DEVICE — 3M™ STERI-STRIP™ REINFORCED ADHESIVE SKIN CLOSURES, R1547, 1/2 IN X 4 IN (12 MM X 100 MM), 6 STRIPS/ENVELOPE: Brand: 3M™ STERI-STRIP™

## (undated) DEVICE — SOLUTION  .9 3000ML

## (undated) DEVICE — ENSEAL X1 TISSUE SEALER, CURVED JAW, 37 CM SHAFT LENGTH: Brand: ENSEAL

## (undated) DEVICE — UNDYED BRAIDED (POLYGLACTIN 910), SYNTHETIC ABSORBABLE SUTURE: Brand: COATED VICRYL

## (undated) DEVICE — STERILE SURGICAL LUBRICANT, METAL TUBE: Brand: SURGILUBE

## (undated) DEVICE — TROCAR: Brand: KII® SLEEVE

## (undated) DEVICE — Device: Brand: JELCO

## (undated) DEVICE — ELECTRO LUBE IS A SINGLE PATIENT USE DEVICE THAT IS INTENDED TO BE USED ON ELECTROSURGICAL ELECTRODES TO REDUCE STICKING.: Brand: KEY SURGICAL ELECTRO LUBE

## (undated) DEVICE — COTTON TIPPED APPLICATOR 3IN

## (undated) DEVICE — PAD ALC 43.5X24IN PREP  LF

## (undated) DEVICE — ACCESS PLATFORM FOR MINIMALLY INVASIVE SURGERY.: Brand: GELPORT® LAPAROSCOPIC  SYSTEM

## (undated) DEVICE — SUT VICRYL 2-0 CT-2 J726D

## (undated) DEVICE — ADHESIVE MASTISOL 2/3ML

## (undated) DEVICE — ECHELON FLEX POWERED PLUS ARTICULATING ENDOSCOPIC LINEAR CUTTER , 60MM: Brand: ECHELON FLEX

## (undated) DEVICE — SOLUTION ANTIFOG W/ SPONGE

## (undated) DEVICE — TUBING MEGADYNE LAPAROSCOPIC

## (undated) DEVICE — TROCAR: Brand: KII FIOS FIRST ENTRY

## (undated) DEVICE — SYSTEM SURGYPAD VELCRO 36IN

## (undated) DEVICE — SUT PDS II 0 CT-1 Z340H

## (undated) DEVICE — PURSE STRING DEVICE: Brand: PURSTRING

## (undated) DEVICE — KIT CLEAN ENDOKIT 1.1OZ GOWNX2

## (undated) DEVICE — KIT ENDO ORCAPOD 160/180/190

## (undated) DEVICE — LEGGINGS SRG 48X31IN CUF STRL

## (undated) DEVICE — SOL NACL IRRIG 0.9% 1000ML BTL

## (undated) DEVICE — 20 ML SYRINGE LUER-LOCK TIP: Brand: MONOJECT

## (undated) DEVICE — SUT VICRYL 3-0 SH J416H

## (undated) NOTE — Clinical Note
Spoke with pt today--TCM/HFU appt made for 5/02/2023--thank you.   Future Appointments 5/2/2023   11:00 AM   Jenna Arenas MD          [facility]

## (undated) NOTE — MR AVS SNAPSHOT
[facility]  [address] [place] [address]  [phone]               Thank you for choosing us for your health care visit with Cristina Alejandro MD.  We are glad to serve you and happy to provide you with this summary of your visit.   Astrid What changed:  See the new instructions. Commonly known as:  PRINIVIL,ZESTRIL           omeprazole 20 MG Cpdr   Take 1 capsule (20 mg total) by mouth daily.    What changed:  when to take this   Commonly known as:  PRILOSEC                Where to Get You Eat plenty of low-fat dairy products High fat meats and dairy   Choose whole grain products Foods high in sodium   Water is best for hydration Fast food.    Eat at home when possible     Tips for increasing your physical activity – Adults who are physically

## (undated) NOTE — LETTER
[address], [place], [place]  Authorization for Invasive Procedure                                                                                           1. I hereby authorize Des Jean MD, my physician and his/her assistants (if applicable), which may include medical students, residents, and/or fellows, to perform the following surgical operation/ procedure and administer such anesthesia as may be determined necessary by my physician: Operation/Procedure name (s) COLONOSCOPY on Yannick Purvis   2. I recognize that during the surgical operation/procedure, unforeseen conditions may necessitate additional or different procedures than those listed above. I, therefore, further authorize and request that the above-named surgeon, assistants, or designees perform such procedures as are, in their judgment, necessary and desirable. 3.   My surgeon/physician has discussed prior to my surgery the potential benefits, risks and side effects of this procedure; the likelihood of achieving goals; and potential problems that might occur during recuperation. They also discussed reasonable alternatives to the procedure, including risks, benefits, and side effects related to the alternatives and risks related to not receiving this procedure. I have had all my questions answered and I acknowledge that no guarantee has been made as to the result that may be obtained. 4.   Should the need arise during my operation/procedure, which includes change of level of care prior to discharge, I also consent to the administration of blood and/or blood products. Further, I understand that despite careful testing and screening of blood or blood products by collecting agencies, I may still be subject to ill effects as a result of receiving a blood transfusion and/or blood products.   The following are some, but not all, of the potential risks that can occur: fever and allergic reactions, hemolytic reactions, transmission of diseases such as Hepatitis, AIDS and Cytomegalovirus (CMV) and fluid overload. In the event that I wish to have an autologous transfusion of my own blood, or a directed donor transfusion, I will discuss this with my physician. Check only if Refusing Blood or Blood Products  I understand refusal of blood or blood products as deemed necessary by my physician may have serious consequences to my condition to include possible death. I hereby assume responsibility for my refusal and release the hospital, its personnel, and my physicians from any responsibility for the consequences of my refusal.    o  Refuse   5. I authorize the use of any specimen, organs, tissues, body parts or foreign objects that may be removed from my body during the operation/procedure for diagnosis, research or teaching purposes and their subsequent disposal by hospital authorities. I also authorize the release of specimen test results and/or written reports to my treating physician on the hospital medical staff or other referring or consulting physicians involved in my care, at the discretion of the Pathologist or my treating physician. 6.   I consent to the photographing or videotaping of the operations or procedures to be performed, including appropriate portions of my body for medical, scientific, or educational purposes, provided my identity is not revealed by the pictures or by descriptive texts accompanying them. If the procedure has been photographed/videotaped, the surgeon will obtain the original picture, image, videotape or CD. The hospital will not be responsible for storage, release or maintenance of the picture, image, tape or CD.    7.   I consent to the presence of a  or observers in the operating room as deemed necessary by my physician or their designees.     8.   I recognize that in the event my procedure results in extended X-Ray/fluoroscopy time, I may develop a skin reaction. 9. If I have a Do Not Attempt Resuscitation (DNAR) order in place, that status will be suspended while in the operating room, procedural suite, and during the recovery period unless otherwise explicitly stated by me (or a person authorized to consent on my behalf). The surgeon or my attending physician will determine when the applicable recovery period ends for purposes of reinstating the DNAR order. 10. Patients having a sterilization procedure: I understand that if the procedure is successful the results will be permanent and it will therefore be impossible for me to inseminate, conceive, or bear children. I also understand that the procedure is intended to result in sterility, although the result has not been guaranteed. 11. I acknowledge that my physician has explained sedation/analgesia administration to me including the risk and benefits I consent to the administration of sedation/analgesia as may be necessary or desirable in the judgment of my physician. I CERTIFY THAT I HAVE READ AND FULLY UNDERSTAND THE ABOVE CONSENT TO OPERATION and/or OTHER PROCEDURE.     _________________________________________ _________________________________     ___________________________________  Signature of Patient     Signature of Responsible Person                   Printed Name of Responsible Person                              _________________________________________ ______________________________        ___________________________________  Signature of Witness         Date  Time         Relationship to Patient    STATEMENT OF PHYSICIAN My signature below affirms that prior to the time of the procedure; I have explained to the patient and/or his/her legal representative, the risks and benefits involved in the proposed treatment and any reasonable alternative to the proposed treatment.  I have also explained the risks and benefits involved in refusal of the proposed treatment and alternatives to the proposed treatment and have answered the patient's questions.  If I have a significant financial interest in a co-management agreement or a significant financial interest in any product or implant, or other significant relationship used in this procedure/surgery, I have disclosed this and had a discussion with my patient.     _______________________________________________________________ _____________________________  Adela Emeterio of Physician)                                                                                         (Date)                                   (Time)  Patient Name: Omar Feliz    : 7/15/1955   Printed: 3/2/2023      Medical Record #: [id number]                                              Page 1 of 1

## (undated) NOTE — LETTER
1/10/2020          To Whom It May Concern:    Delmar Reeves is currently under my medical care and missed work since 1/8/20 and may return to work Tuesday 1/14/20.            Sincerely,    Levi Joyner MD          Document generated by:  Dylan Cedillo

## (undated) NOTE — ED AVS SNAPSHOT
[facility] Emergency Department  [address]   [address] [place] [address]  Phone:  [phone]  Fax:  [phone]   MRN: [id number]    Department:  [facility] Emergency Department   Date of Visit:  7/20/2017 visiting [email or web address].    IF THERE IS ANY CHANGE OR WORSENING OF YOUR CONDITION, CALL YOUR PRIMARY CARE PHYSICIAN AT ONCE OR RETURN IMMEDIATELY TO THE EMERGENCY DEPARTMENT.     If you have been prescribed any medication(s), please fill your prescription

## (undated) NOTE — LETTER
[address], [place], [place]  Authorization for Invasive Procedure                                                                                           1. I hereby authorize Vergie Primrose, MD, my physician and his/her assistants (if applicable), which may include medical students, residents, and/or fellows, to perform the following surgical operation/ procedure and administer such anesthesia as may be determined necessary by my physician: Operation/Procedure name (s) COLONOSCOPY on Jitendra Epp   2. I recognize that during the surgical operation/procedure, unforeseen conditions may necessitate additional or different procedures than those listed above. I, therefore, further authorize and request that the above-named surgeon, assistants, or designees perform such procedures as are, in their judgment, necessary and desirable. 3.   My surgeon/physician has discussed prior to my surgery the potential benefits, risks and side effects of this procedure; the likelihood of achieving goals; and potential problems that might occur during recuperation. They also discussed reasonable alternatives to the procedure, including risks, benefits, and side effects related to the alternatives and risks related to not receiving this procedure. I have had all my questions answered and I acknowledge that no guarantee has been made as to the result that may be obtained. 4.   Should the need arise during my operation/procedure, which includes change of level of care prior to discharge, I also consent to the administration of blood and/or blood products. Further, I understand that despite careful testing and screening of blood or blood products by collecting agencies, I may still be subject to ill effects as a result of receiving a blood transfusion and/or blood products.   The following are some, but not all, of the potential risks that can occur: fever and allergic reactions, hemolytic reactions, transmission of diseases such as Hepatitis, AIDS and Cytomegalovirus (CMV) and fluid overload. In the event that I wish to have an autologous transfusion of my own blood, or a directed donor transfusion, I will discuss this with my physician. Check only if Refusing Blood or Blood Products  I understand refusal of blood or blood products as deemed necessary by my physician may have serious consequences to my condition to include possible death. I hereby assume responsibility for my refusal and release the hospital, its personnel, and my physicians from any responsibility for the consequences of my refusal.    o  Refuse   5. I authorize the use of any specimen, organs, tissues, body parts or foreign objects that may be removed from my body during the operation/procedure for diagnosis, research or teaching purposes and their subsequent disposal by hospital authorities. I also authorize the release of specimen test results and/or written reports to my treating physician on the hospital medical staff or other referring or consulting physicians involved in my care, at the discretion of the Pathologist or my treating physician. 6.   I consent to the photographing or videotaping of the operations or procedures to be performed, including appropriate portions of my body for medical, scientific, or educational purposes, provided my identity is not revealed by the pictures or by descriptive texts accompanying them. If the procedure has been photographed/videotaped, the surgeon will obtain the original picture, image, videotape or CD. The hospital will not be responsible for storage, release or maintenance of the picture, image, tape or CD.    7.   I consent to the presence of a  or observers in the operating room as deemed necessary by my physician or their designees.     8.   I recognize that in the event my procedure results in extended X-Ray/fluoroscopy time, I may develop a skin reaction. 9. If I have a Do Not Attempt Resuscitation (DNAR) order in place, that status will be suspended while in the operating room, procedural suite, and during the recovery period unless otherwise explicitly stated by me (or a person authorized to consent on my behalf). The surgeon or my attending physician will determine when the applicable recovery period ends for purposes of reinstating the DNAR order. 10. Patients having a sterilization procedure: I understand that if the procedure is successful the results will be permanent and it will therefore be impossible for me to inseminate, conceive, or bear children. I also understand that the procedure is intended to result in sterility, although the result has not been guaranteed. 11. I acknowledge that my physician has explained sedation/analgesia administration to me including the risk and benefits I consent to the administration of sedation/analgesia as may be necessary or desirable in the judgment of my physician. I CERTIFY THAT I HAVE READ AND FULLY UNDERSTAND THE ABOVE CONSENT TO OPERATION and/or OTHER PROCEDURE.     _________________________________________ _________________________________     ___________________________________  Signature of Patient     Signature of Responsible Person                   Printed Name of Responsible Person                              _________________________________________ ______________________________        ___________________________________  Signature of Witness         Date  Time         Relationship to Patient    STATEMENT OF PHYSICIAN My signature below affirms that prior to the time of the procedure; I have explained to the patient and/or his/her legal representative, the risks and benefits involved in the proposed treatment and any reasonable alternative to the proposed treatment.  I have also explained the risks and benefits involved in refusal of the proposed treatment and alternatives to the proposed treatment and have answered the patient's questions.  If I have a significant financial interest in a co-management agreement or a significant financial interest in any product or implant, or other significant relationship used in this procedure/surgery, I have disclosed this and had a discussion with my patient.     _______________________________________________________________ _____________________________  Jessica York Physician)                                                                                         (Date)                                   (Time)  Patient Name: Al Hills    : 7/15/1955   Printed: 2/3/2023      Medical Record #: [id number]                                              Page 1 of 1

## (undated) NOTE — MR AVS SNAPSHOT
[facility] [address] [place]  [facility] [address] [place] [address]  [phone]               Thank you for choosing us for your health care visit with Marta Sneed MD.  We are glad to serve you and happy to provide you with this summary of your visit.   Astrid Adhesive Tape     Other reaction(s): rash    Ciprofloxacin     Cephalexin Monohydrate  [Cephalexin]     Other reaction(s): rash    Levofloxacin     Other reaction(s): Itching                Today's Vital Signs     BP Pulse Temp Height Weight BMI    124/72